# Patient Record
Sex: FEMALE | Race: WHITE | Employment: UNEMPLOYED | URBAN - METROPOLITAN AREA
[De-identification: names, ages, dates, MRNs, and addresses within clinical notes are randomized per-mention and may not be internally consistent; named-entity substitution may affect disease eponyms.]

---

## 2019-07-18 ENCOUNTER — HOSPITAL ENCOUNTER (EMERGENCY)
Age: 24
Discharge: HOME | End: 2019-07-18
Attending: EMERGENCY MEDICINE
Payer: MEDICAID

## 2019-07-18 VITALS
OXYGEN SATURATION: 99 % | HEART RATE: 94 BPM | TEMPERATURE: 98.4 F | DIASTOLIC BLOOD PRESSURE: 79 MMHG | RESPIRATION RATE: 16 BRPM | SYSTOLIC BLOOD PRESSURE: 133 MMHG

## 2019-07-18 DIAGNOSIS — J02.9 SORE THROAT: Primary | ICD-10-CM

## 2019-07-18 DIAGNOSIS — Z34.93 PREGNANT AND NOT YET DELIVERED IN THIRD TRIMESTER: ICD-10-CM

## 2019-07-18 PROCEDURE — 99282 EMERGENCY DEPT VISIT SF MDM: CPT

## 2019-07-18 RX ORDER — GUAIFENESIN 100 MG/5ML
81 LIQUID (ML) ORAL DAILY
COMMUNITY
End: 2021-03-08

## 2019-07-18 RX ORDER — FAMOTIDINE 20 MG/1
20 TABLET, FILM COATED ORAL 2 TIMES DAILY
COMMUNITY
End: 2021-03-08

## 2019-07-18 NOTE — ED PROVIDER NOTES
EMERGENCY DEPARTMENT HISTORY AND PHYSICAL EXAM      Date: 2019  Patient Name: Hugo Gomez    History of Presenting Illness     Chief Complaint   Patient presents with    Sore Throat     pt drank a sip of sweet tea and says that now her throat is swollen, pts throat is not red and is talking clearly and can swallow clearly     History Provided By: Patient    HPI: Dani Bowman, 21 y.o. female presents by POV to the ED with cc of a sore throat. the patient states that she got sweet tea for breakfast this morning from [employer]. Upon taking the first sip she felt a burning sensation in her throat. She did not drink any more of the sweat tea. She did however drink 3 bottles of water, which has helped improve the burning sensation in her throat. She states at first she felt like her throat was swelling but this has also improved since onset. She did not take any medications. There are no other complaints, changes, or physical findings at this time. Social Hx: Tobacco (2-3 cigs per day; attempting to quit), EtOH (denies), Illicit drug use (denies). PCP: Unknown, Provider    No current facility-administered medications on file prior to encounter. Current Outpatient Medications on File Prior to Encounter   Medication Sig Dispense Refill    famotidine (PEPCID) 20 mg tablet Take 20 mg by mouth two (2) times a day.  aspirin 81 mg chewable tablet Take 81 mg by mouth daily.  PNV w-CA No.37-Iron-FA-Omega-3 (OB-PURVI ONE) 27-1-330 mg cap Take 1 Tab by mouth.  Indications: PREGNANCY         Past History     Past Medical History:  Past Medical History:   Diagnosis Date    Hearing disorder     Psychiatric problem     depression       Past Surgical History:  Past Surgical History:   Procedure Laterality Date    HX ORTHOPAEDIC      left foot fracture    HX OTHER SURGICAL      mouth surgery    HX OTHER SURGICAL      wisdom teeth     HX OTHER SURGICAL      myrengotomy        Family History:  History reviewed. No pertinent family history. Social History:  Social History     Tobacco Use    Smoking status: Former Smoker     Last attempt to quit: 2014     Years since quittin.1    Smokeless tobacco: Never Used    Tobacco comment: 1-2 cigarettes a day   Substance Use Topics    Alcohol use: No    Drug use: No       Allergies:  No Known Allergies      Review of Systems   Review of Systems   Constitutional: Negative for chills, diaphoresis and fever. HENT: Positive for sore throat. Negative for congestion, ear pain and rhinorrhea. Respiratory: Negative for cough and shortness of breath. Cardiovascular: Negative for chest pain. Gastrointestinal: Negative for abdominal pain, constipation, diarrhea, nausea and vomiting. Genitourinary: Negative for difficulty urinating, dysuria, frequency and hematuria. Musculoskeletal: Negative for arthralgias and myalgias. Neurological: Negative for headaches. All other systems reviewed and are negative. Physical Exam   Physical Exam   Constitutional: She is oriented to person, place, and time. She appears well-developed and well-nourished. No distress. 21 y.o.  female    HENT:   Head: Normocephalic and atraumatic. Mouth/Throat: Oropharynx is clear and moist. No oropharyngeal exudate. Normal voice. No trismus. Eyes: Conjunctivae are normal. Right eye exhibits no discharge. Left eye exhibits no discharge. Neck: Normal range of motion. Neck supple. Cardiovascular: Normal rate, regular rhythm and normal heart sounds. No murmur heard. Pulmonary/Chest: Effort normal and breath sounds normal. No respiratory distress. Abdominal:   Gravid   Neurological: She is alert and oriented to person, place, and time. Skin: Skin is warm and dry. She is not diaphoretic. Psychiatric: She has a normal mood and affect. Her behavior is normal.   Nursing note and vitals reviewed.       Diagnostic Study Results     Labs - None    Radiologic Studies - None    Medical Decision Making   I am the first provider for this patient. I reviewed the vital signs, available nursing notes, past medical history, past surgical history, family history and social history. Vital Signs-Reviewed the patient's vital signs. Patient Vitals for the past 12 hrs:   Temp Pulse Resp BP SpO2   07/18/19 0912  94  133/79    07/18/19 0827 98.4 °F (36.9 °C) (!) 111 16 146/87 99 %     Records Reviewed: Nursing Notes    Provider Notes (Medical Decision Making):   Pharyngitis, mono, strep, seasonal allergies, allergic reaction    ED Course:   Initial assessment performed. The patients presenting problems have been discussed, and they are in agreement with the care plan formulated and outlined with them. I have encouraged them to ask questions as they arise throughout their visit. Critical Care Time: None    Disposition:  DISCHARGE NOTE:  9:10 AM  The pt is ready for discharge. The pt's signs, symptoms, diagnosis, and discharge instructions have been discussed and pt has conveyed their understanding. The pt is to follow up as recommended or return to ER should their symptoms worsen. Plan has been discussed and pt is in agreement. PLAN:  1. Discharge Medication List as of 7/18/2019  9:10 AM        2. Follow-up Information     Follow up With Specialties Details Why Contact Info    Your PCP or GYN   As needed         Return to ED if worse     Diagnosis     Clinical Impression:   1. Sore throat    2. Pregnant and not yet delivered in third trimester          Please note that this dictation was completed with [employer], the computer voice recognition software. Quite often unanticipated grammatical, syntax, homophones, and other interpretive errors are inadvertently transcribed by the computer software. Please disregards these errors. Please excuse any errors that have escaped final proofreading.      This note will not be viewable in MyChart.

## 2019-07-18 NOTE — DISCHARGE INSTRUCTIONS

## 2019-07-18 NOTE — ED NOTES
JAROCHO Dan have reviewed discharge instructions with the patient. The patient verbalized understanding.

## 2019-07-18 NOTE — ED TRIAGE NOTES
Patient is pregnant and due in 3 weeks and four days. However, she does not have any complaints related to her pregnancy.

## 2020-08-12 ENCOUNTER — HOSPITAL ENCOUNTER (EMERGENCY)
Age: 25
Discharge: HOME | End: 2020-08-12
Attending: EMERGENCY MEDICINE
Payer: MEDICAID

## 2020-08-12 VITALS
HEIGHT: 70 IN | RESPIRATION RATE: 15 BRPM | HEART RATE: 86 BPM | DIASTOLIC BLOOD PRESSURE: 88 MMHG | WEIGHT: 272.49 LBS | BODY MASS INDEX: 39.01 KG/M2 | SYSTOLIC BLOOD PRESSURE: 134 MMHG | TEMPERATURE: 98.4 F | OXYGEN SATURATION: 96 %

## 2020-08-12 DIAGNOSIS — Z20.822 PERSON UNDER INVESTIGATION FOR COVID-19: Primary | ICD-10-CM

## 2020-08-12 DIAGNOSIS — F17.200 SMOKER: ICD-10-CM

## 2020-08-12 PROCEDURE — 99282 EMERGENCY DEPT VISIT SF MDM: CPT

## 2020-08-12 PROCEDURE — 87635 SARS-COV-2 COVID-19 AMP PRB: CPT

## 2020-08-12 NOTE — DISCHARGE INSTRUCTIONS
Patient Education        9 Things To Do If You've Been Exposed to COVID-19    Stay home. If you've been exposed, you should stay in isolation for 14 days. Don't go to school, work, or public areas. And don't use public transportation, ride-shares, or taxis unless you have no choice. Leave your home only if you need to get medical care. But call the doctor's office first so they know you're coming, and wear a cloth face cover when you go. Call your doctor. Call your doctor or other health professional to let them know that you've been exposed. They might want you to be tested, or they may have other instructions for you. If you become sick, wear a face cover when you are around other people. It can help stop the spread of the virus when you cough or sneeze. Limit contact with people in your home. If possible, stay in a separate bedroom and use a separate bathroom. Avoid contact with pets and other animals. Cover your mouth and nose with a tissue when you cough or sneeze. Then throw it in the trash right away. Wash your hands often, especially after you cough or sneeze. Use soap and water, and scrub for at least 20 seconds. If soap and water aren't available, use an alcohol-based hand . Don't share personal household items. These include bedding, towels, cups and glasses, and eating utensils. Clean and disinfect your home every day. Use household  or disinfectant wipes or sprays. Take special care to clean things that you grab with your hands. These include doorknobs, remote controls, phones, and handles on your refrigerator and microwave. And don't forget countertops, tabletops, bathrooms, and computer keyboards. Current as of: May 8, 2020               Content Version: 12.5  © 2006-2020 Healthwise, Incorporated. Care instructions adapted under license by [employer] (which disclaims liability or warranty for this information).  If you have questions about a medical condition or this instruction, always ask your healthcare professional. [address] any warranty or liability for your use of this information. Patient Education        Stopping Smoking: Care Instructions  Your Care Instructions     Cigarette smokers crave the nicotine in cigarettes. Giving it up is much harder than simply changing a habit. Your body has to stop craving the nicotine. It is hard to quit, but you can do it. There are many tools that people use to quit smoking. You may find that combining tools works best for you. There are several steps to quitting. First you get ready to quit. Then you get support to help you. After that, you learn new skills and behaviors to become a nonsmoker. For many people, a necessary step is getting and using medicine. Your doctor will help you set up the plan that best meets your needs. You may want to attend a smoking cessation program to help you quit smoking. When you choose a program, look for one that has proven success. Ask your doctor for ideas. You will greatly increase your chances of success if you take medicine as well as get counseling or join a cessation program.  Some of the changes you feel when you first quit tobacco are uncomfortable. Your body will miss the nicotine at first, and you may feel short-tempered and grumpy. You may have trouble sleeping or concentrating. Medicine can help you deal with these symptoms. You may struggle with changing your smoking habits and rituals. The last step is the tricky one: Be prepared for the smoking urge to continue for a time. This is a lot to deal with, but keep at it. You will feel better. Follow-up care is a key part of your treatment and safety. Be sure to make and go to all appointments, and call your doctor if you are having problems. It's also a good idea to know your test results and keep a list of the medicines you take.   How can you care for yourself at home?  · Ask your family, friends, and coworkers for support. You have a better chance of quitting if you have help and support. · Join a support group, such as Nicotine Anonymous, for people who are trying to quit smoking. · Consider signing up for a smoking cessation program, such as the American Lung Association's Freedom from Smoking program.  · Get text messaging support. Go to the website at www.smokefree. gov to sign up for the [facility] program.  · Set a quit date. Pick your date carefully so that it is not right in the middle of a big deadline or stressful time. Once you quit, do not even take a puff. Get rid of all ashtrays and lighters after your last cigarette. Clean your house and your clothes so that they do not smell of smoke. · Learn how to be a nonsmoker. Think about ways you can avoid those things that make you reach for a cigarette. ? Avoid situations that put you at greatest risk for smoking. For some people, it is hard to have a drink with friends without smoking. For others, they might skip a coffee break with coworkers who smoke. ? Change your daily routine. Take a different route to work or eat a meal in a different place. · Cut down on stress. Calm yourself or release tension by doing an activity you enjoy, such as reading a book, taking a hot bath, or gardening. · Talk to your doctor or pharmacist about nicotine replacement therapy, which replaces the nicotine in your body. You still get nicotine but you do not use tobacco. Nicotine replacement products help you slowly reduce the amount of nicotine you need. These products come in several forms, many of them available over-the-counter:  ? Nicotine patches  ? Nicotine gum and lozenges  ? Nicotine inhaler  · Ask your doctor about bupropion (Wellbutrin) or varenicline (Chantix), which are prescription medicines. They do not contain nicotine. They help you by reducing withdrawal symptoms, such as stress and anxiety.   · Some people find hypnosis, acupuncture, and massage helpful for ending the smoking habit. · Eat a healthy diet and get regular exercise. Having healthy habits will help your body move past its craving for nicotine. · Be prepared to keep trying. Most people are not successful the first few times they try to quit. Do not get mad at yourself if you smoke again. Make a list of things you learned and think about when you want to try again, such as next week, next month, or next year. Where can you learn more? Go to http://breanna-oneil.info/  Enter [id number] in the search box to learn more about \"Stopping Smoking: Care Instructions. \"  Current as of: March 12, 2020               Content Version: 12.5  © 9656-4862 Healthwise, Incorporated. Care instructions adapted under license by [employer] (which disclaims liability or warranty for this information). If you have questions about a medical condition or this instruction, always ask your healthcare professional. [place] [address] any warranty or liability for your use of this information.

## 2020-08-12 NOTE — ED TRIAGE NOTES
Pt states she had some of her coworkers test positive for COVID. Pt states she is having headaches at home that she states she can manage on her own. Pt's employer states she must be tested prior to returning to work. Pt denies CP, SOB, cough, fever.

## 2020-08-12 NOTE — ED PROVIDER NOTES
EMERGENCY DEPARTMENT HISTORY AND PHYSICAL EXAM      Date: 2020  Patient Name: Nell Barthel Cornell    History of Presenting Illness     Chief Complaint   Patient presents with    Covid Testing     Pt states she was told she was exposed to 2 coworkers with covid on Monday. Her work will not let her come back without a covid test       History Provided By: Patient    HPI: Eva Hoffmann, 25 y.o. female without PMHx significant, presents by POV to the ED without complaint. She notes she exposed to 2 individuals at work 2 days ago who recently tested positive for COVID 19. She was sent to the ED for a COVID test as she was told that she needs a negative test to return to work. Occupation: Works for [employer]: There has been no recent international or domestic travel. There are no other complaints, changes, or physical findings at this time. Social Hx: Tobacco (1 ppd), EtOH (rare), Illicit drug use (denies)     PCP: Unknown, Provider    No current facility-administered medications on file prior to encounter. Current Outpatient Medications on File Prior to Encounter   Medication Sig Dispense Refill    famotidine (PEPCID) 20 mg tablet Take 20 mg by mouth two (2) times a day.  aspirin 81 mg chewable tablet Take 81 mg by mouth daily.  PNV w-CA No.37-Iron-FA-Omega-3 (OB-PURVI ONE) 27-1-330 mg cap Take 1 Tab by mouth. Indications: PREGNANCY         Past History     Past Medical History:  Past Medical History:   Diagnosis Date    Hearing disorder     Psychiatric problem     depression       Past Surgical History:  Past Surgical History:   Procedure Laterality Date    HX ORTHOPAEDIC      left foot fracture    HX OTHER SURGICAL      mouth surgery    HX OTHER SURGICAL      wisdom teeth     HX OTHER SURGICAL      myrengotomy        Family History:  No family history on file.     Social History:  Social History     Tobacco Use    Smoking status: Former Smoker     Last attempt to quit: 2014     Years since quittin.2    Smokeless tobacco: Never Used    Tobacco comment: 1-2 cigarettes a day   Substance Use Topics    Alcohol use: No    Drug use: No       Allergies:  No Known Allergies      Review of Systems   Review of Systems   Constitutional: Negative for chills, diaphoresis and fever. HENT: Negative for congestion, ear pain, rhinorrhea and sore throat. Respiratory: Negative for cough and shortness of breath. Cardiovascular: Negative for chest pain. Gastrointestinal: Negative for abdominal pain, constipation, diarrhea, nausea and vomiting. Genitourinary: Negative for difficulty urinating, dysuria, frequency and hematuria. Musculoskeletal: Negative for arthralgias and myalgias. Neurological: Negative for headaches. All other systems reviewed and are negative. Physical Exam   Physical Exam  Vitals signs and nursing note reviewed. Constitutional:       General: She is not in acute distress. Appearance: She is well-developed. She is obese. She is not diaphoretic. Comments: 25 y.o.  female    HENT:      Head: Normocephalic and atraumatic. Eyes:      General:         Right eye: No discharge. Left eye: No discharge. Conjunctiva/sclera: Conjunctivae normal.   Neck:      Musculoskeletal: Normal range of motion and neck supple. Cardiovascular:      Rate and Rhythm: Normal rate. Pulmonary:      Effort: Pulmonary effort is normal.   Skin:     General: Skin is warm and dry. Neurological:      Mental Status: She is alert and oriented to person, place, and time. Psychiatric:         Behavior: Behavior normal.         Diagnostic Study Results     Labs - COVID-19 testing pending at discharge. Radiologic Studies - None    Medical Decision Making   I am the first provider for this patient.     I reviewed the vital signs, available nursing notes, past medical history, past surgical history, family history and social history. Vital Signs-Reviewed the patient's vital signs. Patient Vitals for the past 12 hrs:   Temp Pulse Resp BP SpO2   08/12/20 1933 98.4 °F (36.9 °C) 86 15 134/88 96 %       Records Reviewed: Nursing Notes    Provider Notes (Medical Decision Making): The evaluation, management, and disposition decisions of this patient have been made in the context of the current and rapidly developing COVID-19 pandemic. In my clinical judgment, the balance of clinical factors dictate expedited evaluation and discharge from the ED. I have carefully considered the risk and benefits of prolonged ED workups and/or hospitalization vs their risk of acquiring or transmitting COVID-19. I have made reasonable efforts to conserve healthcare resources and defer to safe outpatient alternatives when feasible. I have also discussed the importance of social distancing and proper hygiene to the patient. Based on an appropriate medical screening exam, there is currently no evidence of an emergency medical condition in the patient, and she is clinically safe for discharge. This was a collective decision made with the patient and/or any available family/caretakers. They expressed understanding and agreement with the above. ED Course:   Initial assessment performed. The patients presenting problems have been discussed, and they are in agreement with the care plan formulated and outlined with them. I have encouraged them to ask questions as they arise throughout their visit. Critical Care Time: None    Disposition:  DISCHARGE NOTE:  7:52 PM  The pt is ready for discharge. The pt's signs, symptoms, diagnosis, and discharge instructions have been discussed and pt has conveyed their understanding. The pt is to follow up as recommended or return to ER should their symptoms worsen. Plan has been discussed and pt is in agreement. PLAN:  1. Current Discharge Medication List        2.    Follow-up Information     Follow up With Specialties Details Why Contact Info    Your PCP   As needed         3. COVID Testing results will be called once available  4. Take Tylenol as needed; Avoid NSAIDs  5. Drink plenty of fluids  6. It is advised to lay prone for 3 hours daily  Return to ED if worse     Diagnosis     Clinical Impression:   1. Person under investigation for COVID-19    2. Smoker          Please note that this dictation was completed with [employer], the computer voice recognition software. Quite often unanticipated grammatical, syntax, homophones, and other interpretive errors are inadvertently transcribed by the computer software. Please disregards these errors. Please excuse any errors that have escaped final proofreading. This note will not be viewable in [address]

## 2020-08-13 ENCOUNTER — TELEPHONE (OUTPATIENT)
Dept: CASE MANAGEMENT | Age: 25
End: 2020-08-13

## 2020-08-13 NOTE — ED NOTES
Patient discharged by Amna Grant. Patient provided with discharge instructions Rx and instructions on follow up care. Patient out of ED ambulatory accompanied by self.

## 2020-08-13 NOTE — TELEPHONE ENCOUNTER
20 3:33 PM     Patient contacted regarding COVID-19 exposure. Discussed COVID-19 related testing which was pending at this time. Test results were pending. Patient informed of results, if available? N/A     Care Transition Nurse/ Ambulatory Care Manager/ LPN Care Coordinator contacted the patient by telephone to perform post discharge assessment. Verified name and  with patient as identifiers. Provided introduction to self, and explanation of the CTN/ACM/LPN role, and reason for call due to risk factors for infection and/or exposure to COVID-19. Symptoms reviewed with patient who verbalized the following symptoms: pain or aching joints, nausea, vomiting, low body temperature, no worsening symptoms and also has headaches, but states headaches are not new for her. .      Due to no new or worsening symptoms encounter was not routed to provider for escalation. Discussed follow-up appointments. If no appointment was previously scheduled, appointment scheduling offered: [facility]): No future appointments. Non[facility] follow up appointment(s): N/A   States she is waiting to have a new PCP assigned; has just moved to [place]. Advance Care Planning:   Does patient have an Advance Directive: not on file     Patient has following risk factors of: no known risk factors. CTN/ACM/LPN reviewed discharge instructions, medical action plan and red flags such as increased shortness of breath, increasing fever and signs of decompensation with patient who verbalized understanding. Discussed exposure protocols and quarantine with CDC Guidelines What to do if you are sick with coronavirus disease 2019.  Patient was given an opportunity for questions and concerns. Verbalizes understanding of the need to quarantine until negative results or for 14 days if positive result. Encouraged pt to activate MyChart and with her permission, I've sent an email invitation.   Advised that if she activates [employer] in the next week or so, I'll send our COVID-19 information and phone resources to her via the portal.    No medications prescribed in ED visit and no F/U appts recommended. Patient/family/caregiver given information for [employer] and agrees to enroll no, declined  Patient's preferred e-mail:  N/A  Patient's preferred phone number: N/A  Based on Loop alert triggers, patient will be contacted by nurse care manager for worsening symptoms. Plan for follow-up call in 14 days based on severity of symptoms and risk factors.

## 2020-08-14 LAB
HEALTH STATUS, XMCV2T: NORMAL
SARS-COV-2, COV2NT: NOT DETECTED
SOURCE, COVRS: NORMAL
SPECIMEN SOURCE, FCOV2M: NORMAL
SPECIMEN TYPE, XMCV1T: NORMAL

## 2020-08-25 ENCOUNTER — PATIENT MESSAGE (OUTPATIENT)
Dept: CASE MANAGEMENT | Age: 25
End: 2020-08-25

## 2020-08-29 ENCOUNTER — TELEPHONE (OUTPATIENT)
Dept: CASE MANAGEMENT | Age: 25
End: 2020-08-29

## 2020-08-29 NOTE — TELEPHONE ENCOUNTER
8/29/20 5:09 PM     Patient resolved from Transition of Care episode on 8/29/20. Discussed COVID-19 related testing which was available at this time. Test results were negative. Patient informed of results, if available? I'm not certain but she didn't ask about these on the call. Patient/family has been provided the following resources and education related to COVID-19:                         Signs, symptoms and red flags related to COVID-19            CDC exposure and quarantine guidelines            [facility] exposure contact - [phone]            Contact for their local Department of Health                 Patient currently reports that the following symptoms have improved:  pt states she is doing really well now and does not have any questions/concerns. I thanked her for her time, advised this will be my final call to her, and we disconnected. No further outreach scheduled with this CTN/ACM/LPN/HC/ MA. Episode of Care resolved. Patient has this CTN/ACM/LPN/HC/MA contact information if future needs arise.

## 2021-02-11 ENCOUNTER — HOSPITAL ENCOUNTER (EMERGENCY)
Age: 26
Discharge: HOME | End: 2021-02-11
Attending: EMERGENCY MEDICINE
Payer: MEDICAID

## 2021-02-11 ENCOUNTER — APPOINTMENT (OUTPATIENT)
Dept: CT IMAGING | Age: 26
End: 2021-02-11
Attending: EMERGENCY MEDICINE
Payer: MEDICAID

## 2021-02-11 VITALS
TEMPERATURE: 98.5 F | HEART RATE: 93 BPM | SYSTOLIC BLOOD PRESSURE: 161 MMHG | DIASTOLIC BLOOD PRESSURE: 55 MMHG | RESPIRATION RATE: 16 BRPM | OXYGEN SATURATION: 98 % | WEIGHT: 293 LBS | BODY MASS INDEX: 48.82 KG/M2 | HEIGHT: 65 IN

## 2021-02-11 DIAGNOSIS — R14.0 ABDOMINAL BLOATING: ICD-10-CM

## 2021-02-11 DIAGNOSIS — R10.84 ABDOMINAL PAIN, GENERALIZED: Primary | ICD-10-CM

## 2021-02-11 DIAGNOSIS — K76.0 HEPATIC STEATOSIS: ICD-10-CM

## 2021-02-11 LAB
ALBUMIN SERPL-MCNC: 4 G/DL (ref 3.5–5)
ALBUMIN/GLOB SERPL: 0.9 {RATIO} (ref 1.1–2.2)
ALP SERPL-CCNC: 78 U/L (ref 45–117)
ALT SERPL-CCNC: 44 U/L (ref 12–78)
ANION GAP SERPL CALC-SCNC: 6 MMOL/L (ref 5–15)
APPEARANCE UR: CLEAR
AST SERPL-CCNC: 19 U/L (ref 15–37)
BACTERIA URNS QL MICRO: NEGATIVE /HPF
BASOPHILS # BLD: 0 K/UL (ref 0–0.1)
BASOPHILS NFR BLD: 0 % (ref 0–1)
BILIRUB SERPL-MCNC: 0.3 MG/DL (ref 0.2–1)
BILIRUB UR QL: NEGATIVE
BUN SERPL-MCNC: 9 MG/DL (ref 6–20)
BUN/CREAT SERPL: 12 (ref 12–20)
CALCIUM SERPL-MCNC: 9.3 MG/DL (ref 8.5–10.1)
CHLORIDE SERPL-SCNC: 104 MMOL/L (ref 97–108)
CO2 SERPL-SCNC: 26 MMOL/L (ref 21–32)
COLOR UR: NORMAL
CREAT SERPL-MCNC: 0.77 MG/DL (ref 0.55–1.02)
DIFFERENTIAL METHOD BLD: ABNORMAL
EOSINOPHIL # BLD: 0.1 K/UL (ref 0–0.4)
EOSINOPHIL NFR BLD: 1 % (ref 0–7)
EPITH CASTS URNS QL MICRO: NORMAL /LPF
ERYTHROCYTE [DISTWIDTH] IN BLOOD BY AUTOMATED COUNT: 12.6 % (ref 11.5–14.5)
GLOBULIN SER CALC-MCNC: 4.6 G/DL (ref 2–4)
GLUCOSE SERPL-MCNC: 99 MG/DL (ref 65–100)
GLUCOSE UR STRIP.AUTO-MCNC: NEGATIVE MG/DL
HCG SERPL QL: NEGATIVE
HCT VFR BLD AUTO: 43.2 % (ref 35–47)
HGB BLD-MCNC: 14.3 G/DL (ref 11.5–16)
HGB UR QL STRIP: NEGATIVE
HYALINE CASTS URNS QL MICRO: NORMAL /LPF (ref 0–5)
IMM GRANULOCYTES # BLD AUTO: 0 K/UL (ref 0–0.04)
IMM GRANULOCYTES NFR BLD AUTO: 0 % (ref 0–0.5)
KETONES UR QL STRIP.AUTO: NEGATIVE MG/DL
LEUKOCYTE ESTERASE UR QL STRIP.AUTO: NEGATIVE
LIPASE SERPL-CCNC: 71 U/L (ref 73–393)
LYMPHOCYTES # BLD: 2.9 K/UL (ref 0.8–3.5)
LYMPHOCYTES NFR BLD: 26 % (ref 12–49)
MCH RBC QN AUTO: 29.5 PG (ref 26–34)
MCHC RBC AUTO-ENTMCNC: 33.1 G/DL (ref 30–36.5)
MCV RBC AUTO: 89.1 FL (ref 80–99)
MONOCYTES # BLD: 0.6 K/UL (ref 0–1)
MONOCYTES NFR BLD: 6 % (ref 5–13)
NEUTS SEG # BLD: 7.4 K/UL (ref 1.8–8)
NEUTS SEG NFR BLD: 67 % (ref 32–75)
NITRITE UR QL STRIP.AUTO: NEGATIVE
NRBC # BLD: 0 K/UL (ref 0–0.01)
NRBC BLD-RTO: 0 PER 100 WBC
PH UR STRIP: 6 [PH] (ref 5–8)
PLATELET # BLD AUTO: 358 K/UL (ref 150–400)
PMV BLD AUTO: 9.7 FL (ref 8.9–12.9)
POTASSIUM SERPL-SCNC: 3.9 MMOL/L (ref 3.5–5.1)
PROT SERPL-MCNC: 8.6 G/DL (ref 6.4–8.2)
PROT UR STRIP-MCNC: NEGATIVE MG/DL
RBC # BLD AUTO: 4.85 M/UL (ref 3.8–5.2)
RBC #/AREA URNS HPF: NORMAL /HPF (ref 0–5)
SODIUM SERPL-SCNC: 136 MMOL/L (ref 136–145)
SP GR UR REFRACTOMETRY: 1.02 (ref 1–1.03)
TSH SERPL DL<=0.05 MIU/L-ACNC: 7.63 UIU/ML (ref 0.36–3.74)
UA: UC IF INDICATED,UAUC: NORMAL
UROBILINOGEN UR QL STRIP.AUTO: 0.2 EU/DL (ref 0.2–1)
WBC # BLD AUTO: 11.1 K/UL (ref 3.6–11)
WBC URNS QL MICRO: NORMAL /HPF (ref 0–4)

## 2021-02-11 PROCEDURE — 85025 COMPLETE CBC W/AUTO DIFF WBC: CPT

## 2021-02-11 PROCEDURE — 84703 CHORIONIC GONADOTROPIN ASSAY: CPT

## 2021-02-11 PROCEDURE — 74177 CT ABD & PELVIS W/CONTRAST: CPT

## 2021-02-11 PROCEDURE — 84443 ASSAY THYROID STIM HORMONE: CPT

## 2021-02-11 PROCEDURE — 99283 EMERGENCY DEPT VISIT LOW MDM: CPT

## 2021-02-11 PROCEDURE — 81001 URINALYSIS AUTO W/SCOPE: CPT

## 2021-02-11 PROCEDURE — 80053 COMPREHEN METABOLIC PANEL: CPT

## 2021-02-11 PROCEDURE — 74011000636 HC RX REV CODE- 636: Performed by: EMERGENCY MEDICINE

## 2021-02-11 PROCEDURE — 36415 COLL VENOUS BLD VENIPUNCTURE: CPT

## 2021-02-11 PROCEDURE — 83690 ASSAY OF LIPASE: CPT

## 2021-02-11 RX ORDER — DICYCLOMINE HYDROCHLORIDE 20 MG/1
20 TABLET ORAL
Qty: 20 TAB | Refills: 0 | Status: SHIPPED | OUTPATIENT
Start: 2021-02-11 | End: 2021-03-08

## 2021-02-11 RX ADMIN — IOPAMIDOL 100 ML: 755 INJECTION, SOLUTION INTRAVENOUS at 20:37

## 2021-02-12 NOTE — ED NOTES
I have reviewed discharge instructions with the patient. The patient verbalized understanding. Pt refused doing her TSH lab.  Pt stated that she will get it done outpatient

## 2021-02-12 NOTE — ED PROVIDER NOTES
EMERGENCY DEPARTMENT HISTORY AND PHYSICAL EXAM      Date: 2021  Patient Name: Meenakshi Gomez    History of Presenting Illness     Chief Complaint   Patient presents with    Abdominal Pain     Bloating in abd for several months, now firmness noted. Reports diarrhea and urinary frequency. Several pregnancy tests done, all negative. Pt has birth control injection. Concern now for ovarian cancer, reports +family hx. History Provided By: Patient    HPI: Elvis Santillan, 22 y.o. female presents to the ED with cc of abdominal pain and bloating. Patient symptoms started 2 months ago. She does not really have pain:  it is more of a pressure in her whole abdomen. She has also gained 45 pounds during that time. She denies dysuria but has urinary frequency. She told nursing that she was having diarrhea. She denied diarrhea when I asked. She denies fever, chills, cough, chest pain or shortness of breath. The severity of the pressure sensation in the abdomen is a 5 out of 10. She has a family history of ovarian cancer, so she is worried about that. She has not taken anything for the pain. Occasionally, she has back pain, but denies that currently. There are no other complaints, changes, or physical findings at this time. PCP: None    No current facility-administered medications on file prior to encounter. Current Outpatient Medications on File Prior to Encounter   Medication Sig Dispense Refill    famotidine (PEPCID) 20 mg tablet Take 20 mg by mouth two (2) times a day.  aspirin 81 mg chewable tablet Take 81 mg by mouth daily.  PNV w-CA No.37-Iron-FA-Omega-3 (OB- ONE) 27-1-330 mg cap Take 1 Tab by mouth.  Indications: PREGNANCY         Past History     Past Medical History:  Past Medical History:   Diagnosis Date    Hearing disorder     Psychiatric problem     depression       Past Surgical History:  Past Surgical History:   Procedure Laterality Date    HX ORTHOPAEDIC      left foot fracture    HX OTHER SURGICAL      mouth surgery    HX OTHER SURGICAL      wisdom teeth 2013    HX OTHER SURGICAL      myrengotomy 2010       Family History:  No family history on file. Social History:  Social History     Tobacco Use    Smoking status: Former Smoker     Quit date: 2014     Years since quittin.7    Smokeless tobacco: Never Used    Tobacco comment: 1-2 cigarettes a day   Substance Use Topics    Alcohol use: No    Drug use: No       Allergies:  No Known Allergies      Review of Systems   Review of Systems   Constitutional: Negative for fever. HENT: Negative for congestion. Eyes: Negative. Respiratory: Negative for shortness of breath. Cardiovascular: Negative for chest pain. Gastrointestinal: Positive for abdominal pain. Endocrine: Negative for heat intolerance. Genitourinary: Negative. Musculoskeletal: Negative for back pain. Skin: Negative for rash. Allergic/Immunologic: Negative for immunocompromised state. Neurological: Negative for dizziness. Hematological: Does not bruise/bleed easily. Psychiatric/Behavioral: Negative. All other systems reviewed and are negative. Physical Exam   Physical Exam  Vitals signs and nursing note reviewed. Constitutional:       General: She is not in acute distress. Appearance: She is well-developed. HENT:      Head: Normocephalic. Neck:      Musculoskeletal: Normal range of motion and neck supple. Cardiovascular:      Rate and Rhythm: Normal rate and regular rhythm. Heart sounds: Normal heart sounds. Pulmonary:      Effort: Pulmonary effort is normal.      Breath sounds: Normal breath sounds. Abdominal:      General: Bowel sounds are normal.      Palpations: Abdomen is soft. Tenderness: There is generalized abdominal tenderness. Musculoskeletal: Normal range of motion. Skin:     General: Skin is warm and dry.    Neurological:      General: No focal deficit present. Mental Status: She is alert and oriented to person, place, and time. Psychiatric:         Mood and Affect: Mood normal.         Behavior: Behavior normal.         Diagnostic Study Results     Labs -     Recent Results (from the past 12 hour(s))   URINALYSIS W/ REFLEX CULTURE    Collection Time: 02/11/21  6:36 PM    Specimen: Urine   Result Value Ref Range    Color YELLOW/STRAW      Appearance CLEAR CLEAR      Specific gravity 1.021 1.003 - 1.030      pH (UA) 6.0 5.0 - 8.0      Protein Negative NEG mg/dL    Glucose Negative NEG mg/dL    Ketone Negative NEG mg/dL    Bilirubin Negative NEG      Blood Negative NEG      Urobilinogen 0.2 0.2 - 1.0 EU/dL    Nitrites Negative NEG      Leukocyte Esterase Negative NEG      WBC 0-4 0 - 4 /hpf    RBC 0-5 0 - 5 /hpf    Epithelial cells FEW FEW /lpf    Bacteria Negative NEG /hpf    UA:UC IF INDICATED CULTURE NOT INDICATED BY UA RESULT CNI      Hyaline cast 0-2 0 - 5 /lpf   METABOLIC PANEL, COMPREHENSIVE    Collection Time: 02/11/21  6:43 PM   Result Value Ref Range    Sodium 136 136 - 145 mmol/L    Potassium 3.9 3.5 - 5.1 mmol/L    Chloride 104 97 - 108 mmol/L    CO2 26 21 - 32 mmol/L    Anion gap 6 5 - 15 mmol/L    Glucose 99 65 - 100 mg/dL    BUN 9 6 - 20 MG/DL    Creatinine 0.77 0.55 - 1.02 MG/DL    BUN/Creatinine ratio 12 12 - 20      GFR est AA >60 >60 ml/min/1.73m2    GFR est non-AA >60 >60 ml/min/1.73m2    Calcium 9.3 8.5 - 10.1 MG/DL    Bilirubin, total 0.3 0.2 - 1.0 MG/DL    ALT (SGPT) 44 12 - 78 U/L    AST (SGOT) 19 15 - 37 U/L    Alk.  phosphatase 78 45 - 117 U/L    Protein, total 8.6 (H) 6.4 - 8.2 g/dL    Albumin 4.0 3.5 - 5.0 g/dL    Globulin 4.6 (H) 2.0 - 4.0 g/dL    A-G Ratio 0.9 (L) 1.1 - 2.2     LIPASE    Collection Time: 02/11/21  6:43 PM   Result Value Ref Range    Lipase 71 (L) 73 - 393 U/L   HCG QL SERUM    Collection Time: 02/11/21  6:43 PM   Result Value Ref Range    HCG, Ql. Negative NEG     CBC WITH AUTOMATED DIFF    Collection Time: 02/11/21  6:43 PM   Result Value Ref Range    WBC 11.1 (H) 3.6 - 11.0 K/uL    RBC 4.85 3.80 - 5.20 M/uL    HGB 14.3 11.5 - 16.0 g/dL    HCT 43.2 35.0 - 47.0 %    MCV 89.1 80.0 - 99.0 FL    MCH 29.5 26.0 - 34.0 PG    MCHC 33.1 30.0 - 36.5 g/dL    RDW 12.6 11.5 - 14.5 %    PLATELET 931 033 - 829 K/uL    MPV 9.7 8.9 - 12.9 FL    NRBC 0.0 0  WBC    ABSOLUTE NRBC 0.00 0.00 - 0.01 K/uL    NEUTROPHILS 67 32 - 75 %    LYMPHOCYTES 26 12 - 49 %    MONOCYTES 6 5 - 13 %    EOSINOPHILS 1 0 - 7 %    BASOPHILS 0 0 - 1 %    IMMATURE GRANULOCYTES 0 0.0 - 0.5 %    ABS. NEUTROPHILS 7.4 1.8 - 8.0 K/UL    ABS. LYMPHOCYTES 2.9 0.8 - 3.5 K/UL    ABS. MONOCYTES 0.6 0.0 - 1.0 K/UL    ABS. EOSINOPHILS 0.1 0.0 - 0.4 K/UL    ABS. BASOPHILS 0.0 0.0 - 0.1 K/UL    ABS. IMM. GRANS. 0.0 0.00 - 0.04 K/UL    DF AUTOMATED         Radiologic Studies -   CT ABD PELV W CONT   Final Result   Hepatic steatosis. No acute process identified        CT Results  (Last 48 hours)    None        CXR Results  (Last 48 hours)    None          Medical Decision Making   I am the first provider for this patient. I reviewed the vital signs, available nursing notes, past medical history, past surgical history, family history and social history. Vital Signs-Reviewed the patient's vital signs. Patient Vitals for the past 12 hrs:   Temp Pulse Resp BP SpO2   02/11/21 1900    (!) 161/55    02/11/21 1845    (!) 152/90 98 %   02/11/21 1738 98.5 °F (36.9 °C) 93 16 (!) 164/85 98 %         Records Reviewed: Nursing Notes and Old Medical Records    Provider Notes (Medical Decision Making):   Ascites, diverticulitis, malignancy, appendicitis,    ED Course:   Initial assessment performed. The patients presenting problems have been discussed, and they are in agreement with the care plan formulated and outlined with them. I have encouraged them to ask questions as they arise throughout their visit. progress note:    Patient is feeling better.   Her results were reviewed. She is advised to follow-up and return to ER if worse             Critical Care Time:   0    Disposition:  home    DISCHARGE PLAN:  1. Discharge Medication List as of 2021  9:33 PM      START taking these medications    Details   dicyclomine (BENTYL) 20 mg tablet Take 1 Tab by mouth every six (6) hours as needed for Abdominal Cramps., Normal, Disp-20 Tab, R-0         CONTINUE these medications which have NOT CHANGED    Details   famotidine (PEPCID) 20 mg tablet Take 20 mg by mouth two (2) times a day., Historical Med      aspirin 81 mg chewable tablet Take 81 mg by mouth daily. , Historical Med      PNV w-CA No.37-Iron-FA-Omega-3 (OB- ONE) 27-1-330 mg cap Take 1 Tab by mouth. Indications: PREGNANCY, Historical Med           2. Follow-up Information     Follow up With Specialties Details Why Contact Info    Yun Castro MD Gastroenterology  As needed [address]  [address] [facility]  [phone] [address] [place] [address] EMERGENCY DEPT Emergency Medicine  If symptoms worsen [address]  [address] [facility]  [phone]        3. Return to ED if worse     Diagnosis     Clinical Impression:   1. Abdominal pain, generalized    2. Hepatic steatosis    3. Abdominal bloating        Attestations:    Kenneth Shay MD    Please note that this dictation was completed with [employer], the computer voice recognition software. Quite often unanticipated grammatical, syntax, homophones, and other interpretive errors are inadvertently transcribed by the computer software. Please disregard these errors. Please excuse any errors that have escaped final proofreading. Thank you.

## 2021-02-12 NOTE — ED NOTES
Bedside shift change report given to [place] (oncoming nurse) by Marda Cowden (offgoing nurse). Report included the following information SBAR, ED Summary and MAR.

## 2021-03-08 ENCOUNTER — OFFICE VISIT (OUTPATIENT)
Dept: INTERNAL MEDICINE CLINIC | Age: 26
End: 2021-03-08
Payer: MEDICAID

## 2021-03-08 VITALS
HEART RATE: 103 BPM | TEMPERATURE: 98.6 F | DIASTOLIC BLOOD PRESSURE: 83 MMHG | SYSTOLIC BLOOD PRESSURE: 131 MMHG | WEIGHT: 293 LBS | BODY MASS INDEX: 48.82 KG/M2 | OXYGEN SATURATION: 96 % | HEIGHT: 65 IN | RESPIRATION RATE: 12 BRPM

## 2021-03-08 DIAGNOSIS — Z23 NEEDS FLU SHOT: Primary | ICD-10-CM

## 2021-03-08 DIAGNOSIS — E03.9 ACQUIRED HYPOTHYROIDISM: ICD-10-CM

## 2021-03-08 DIAGNOSIS — N32.81 OVERACTIVE BLADDER: ICD-10-CM

## 2021-03-08 DIAGNOSIS — M72.2 BILATERAL PLANTAR FASCIITIS: ICD-10-CM

## 2021-03-08 DIAGNOSIS — E66.01 MORBID OBESITY WITH BMI OF 50.0-59.9, ADULT (HCC): ICD-10-CM

## 2021-03-08 DIAGNOSIS — K21.9 GASTROESOPHAGEAL REFLUX DISEASE WITHOUT ESOPHAGITIS: ICD-10-CM

## 2021-03-08 DIAGNOSIS — F51.01 PRIMARY INSOMNIA: ICD-10-CM

## 2021-03-08 DIAGNOSIS — K76.0 HEPATIC STEATOSIS: ICD-10-CM

## 2021-03-08 DIAGNOSIS — R14.0 ABDOMINAL BLOATING: ICD-10-CM

## 2021-03-08 PROCEDURE — 99204 OFFICE O/P NEW MOD 45 MIN: CPT | Performed by: PHYSICIAN ASSISTANT

## 2021-03-08 PROCEDURE — 90471 IMMUNIZATION ADMIN: CPT | Performed by: PHYSICIAN ASSISTANT

## 2021-03-08 PROCEDURE — 90686 IIV4 VACC NO PRSV 0.5 ML IM: CPT | Performed by: PHYSICIAN ASSISTANT

## 2021-03-08 RX ORDER — TRAZODONE HYDROCHLORIDE 50 MG/1
50 TABLET ORAL
Qty: 30 TAB | Refills: 2 | Status: SHIPPED | OUTPATIENT
Start: 2021-03-08 | End: 2021-04-06

## 2021-03-08 RX ORDER — LEVOTHYROXINE SODIUM 25 UG/1
25 TABLET ORAL
Qty: 30 TAB | Refills: 1 | Status: SHIPPED | OUTPATIENT
Start: 2021-03-08 | End: 2021-06-23 | Stop reason: SDUPTHER

## 2021-03-08 RX ORDER — OMEPRAZOLE 40 MG/1
40 CAPSULE, DELAYED RELEASE ORAL DAILY
Qty: 30 CAP | Refills: 2 | Status: SHIPPED | OUTPATIENT
Start: 2021-03-08 | End: 2021-06-28

## 2021-03-08 NOTE — PROGRESS NOTES
Sarah Gomez  Identified pt with two pt identifiers(name and ). Chief Complaint   Patient presents with   [address] [facility] ED Follow-up    Immunization/Injection       Reviewed record In preparation for visit and have obtained necessary documentation. 1. Have you been to the ER, urgent care clinic or hospitalized since your last visit? 1st visit    2. Have you seen or consulted any other health care providers outside of the [address] [place] [address] since your last visit? Include any pap smears or colon screening. 1st visit    Vitals reviewed with provider. Health Maintenance reviewed: After verbal order read back of [facility], patient received Flu Shot in left deltoid. Derik Pathak 47: 20920-299-34 Lot: [id number] 21. Patient tolerated procedure without complaints and received VIS. Health Maintenance Due   Topic    Hepatitis C Screening     HPV Age 9Y-34Y (2 - 2-dose series)    COVID-19 Vaccine (1 of 2)    PAP AKA CERVICAL CYTOLOGY     Flu Vaccine (1)     Wt Readings from Last 3 Encounters:   21 314 lb (142.4 kg)   21 308 lb 6.8 oz (139.9 kg)   20 272 lb 7.8 oz (123.6 kg)   1   Temp Readings from Last 3 Encounters:   21 98.6 °F (37 °C) (Oral)   21 98.5 °F (36.9 °C)   20 98.4 °F (36.9 °C)    Patient    SOME COLLEGE    NONE    No   PRIMARY LANGUAGE ENGLISH   LEARNER PREFERENCE PRIMARY DEMONSTRATION   ANSWERED BY patient   RELATIONSHIP SELF        Depression Screening:   :       3 most recent PHQ Screens 3/8/2021   Little interest or pleasure in doing things More than half the days   Feeling down, depressed, irritable, or hopeless Nearly every day   Total Score PHQ 2 5        Fall Risk Assessment:   :     No flowsheet data found. Abuse Screening:   :     No flowsheet data found.      ADL Screening:   :       ADL Assessment 3/8/2021   Feeding yourself No Help Needed   Getting from bed to chair No Help Needed   Getting dressed No Help Needed Bathing or showering No Help Needed   Walk across the room (includes cane/walker) No Help Needed   Using the telphone No Help Needed   Taking your medications No Help Needed   Preparing meals No Help Needed   Managing money (expenses/bills) No Help Needed   Moderately strenuous housework (laundry) No Help Needed   Shopping for personal items (toiletries/medicines) No Help Needed   Shopping for groceries No Help Needed   Driving No Help Needed   Climbing a flight of stairs No Help Needed   Getting to places beyond walking distances No Help Needed

## 2021-03-08 NOTE — PATIENT INSTRUCTIONS
Vaccine Information Statement Influenza (Flu) Vaccine (Inactivated or Recombinant): What You Need to Know Many Vaccine Information Statements are available in Estonian and other languages. See www.immunize.org/vis Hojas de información sobre vacunas están disponibles en español y en muchos otros idiomas. Visite www.immunize.org/vis 1. Why get vaccinated? Influenza vaccine can prevent influenza (flu). Flu is a contagious disease that spreads around the [facility] every year, usually between October and May. Anyone can get the flu, but it is more dangerous for some people. Infants and young children, people 72years of age and older, pregnant women, and people with certain health conditions or a weakened immune system are at greatest risk of flu complications. Pneumonia, bronchitis, sinus infections and ear infections are examples of flu-related complications. If you have a medical condition, such as heart disease, cancer or diabetes, flu can make it worse. Flu can cause fever and chills, sore throat, muscle aches, fatigue, cough, headache, and runny or stuffy nose. Some people may have vomiting and diarrhea, though this is more common in children than adults. Each year thousands of people in the [facility] die from flu, and many more are hospitalized. Flu vaccine prevents millions of illnesses and flu-related visits to the doctor each year. 2. Influenza vaccines CDC recommends everyone 10months of age and older get vaccinated every flu season. Children 6 months through 6years of age may need 2 doses during a single flu season. Everyone else needs only 1 dose each flu season. It takes about 2 weeks for protection to develop after vaccination. There are many flu viruses, and they are always changing. Each year a new flu vaccine is made to protect against three or four viruses that are likely to cause disease in the upcoming flu season.  Even when the vaccine doesnt exactly match these viruses, it may still provide some protection. Influenza vaccine does not cause flu. Influenza vaccine may be given at the same time as other vaccines. 3. Talk with your health care provider Tell your vaccine provider if the person getting the vaccine: 
 Has had an allergic reaction after a previous dose of influenza vaccine, or has any severe, life-threatening allergies.  Has ever had Guillain-Barré Syndrome (also called GBS). In some cases, your health care provider may decide to postpone influenza vaccination to a future visit. People with minor illnesses, such as a cold, may be vaccinated. People who are moderately or severely ill should usually wait until they recover before getting influenza vaccine. Your health care provider can give you more information. 4. Risks of a reaction  Soreness, redness, and swelling where shot is given, fever, muscle aches, and headache can happen after influenza vaccine.  There may be a very small increased risk of Guillain-Barré Syndrome (GBS) after inactivated influenza vaccine (the flu shot). Kristen Fenton children who get the flu shot along with pneumococcal vaccine (PCV13), and/or DTaP vaccine at the same time might be slightly more likely to have a seizure caused by fever. Tell your health care provider if a child who is getting flu vaccine has ever had a seizure. People sometimes faint after medical procedures, including vaccination. Tell your provider if you feel dizzy or have vision changes or ringing in the ears. As with any medicine, there is a very remote chance of a vaccine causing a severe allergic reaction, other serious injury, or death. 5. What if there is a serious problem? An allergic reaction could occur after the vaccinated person leaves the clinic.  If you see signs of a severe allergic reaction (hives, swelling of the face and throat, difficulty breathing, a fast heartbeat, dizziness, or weakness), call 9-1-1 and get the person to the nearest hospital. 
 
For other signs that concern you, call your health care provider. Adverse reactions should be reported to the Vaccine Adverse Event Reporting System (VAERS). Your health care provider will usually file this report, or you can do it yourself. Visit the VAERS website at www.vaers. [facility].gov or call 7-923.277.1233. VAERS is only for reporting reactions, and VAERS staff do not give medical advice. 6. The National Vaccine Injury Compensation Program 
 
The [facility] Vaccine Injury Compensation Program (VICP) is a federal program that was created to compensate people who may have been injured by certain vaccines. Visit the VICP website at www.[facility].gov/vaccinecompensation or call 3-594.923.4250 to learn about the program and about filing a claim. There is a time limit to file a claim for compensation. 7. How can I learn more?  Ask your health care provider.  Call your local or state health department.  Contact the Centers for Disease Control and Prevention (CDC): 
- Call 1-846.206.5938 (1-234-LFM-INFO) or 
- Visit CDCs influenza website at www.cdc.gov/flu Vaccine Information Statement (Interim) Inactivated Influenza Vaccine 8/15/2019 
42 BAR[address] [place] [id number] Department of Health and [place] [employer] Centers for Disease Control and Prevention Office Use Only Plantar Fasciitis: Exercises Introduction Here are some examples of exercises for you to try. The exercises may be suggested for a condition or for rehabilitation. Start each exercise slowly. Ease off the exercises if you start to have pain. You will be told when to start these exercises and which ones will work best for you. How to do the exercises Towel stretch 1. Sit with your legs extended and knees straight. 2. Place a towel around your foot just under the toes. 3. Hold each end of the towel in each hand, with your hands above your knees.  
4. Pull back with the towel so that your foot stretches toward you. 5. Hold the position for at least 15 to 30 seconds. 6. Repeat 2 to 4 times a session, up to 5 sessions a day. Calf stretch This exercise stretches the muscles at the back of the lower leg (the calf) and the Achilles tendon. Do this exercise 3 or 4 times a day, 5 days a week. 1. Stand facing a wall with your hands on the wall at about eye level. Put the leg you want to stretch about a step behind your other leg. 2. Keeping your back heel on the floor, bend your front knee until you feel a stretch in the back leg. 3. Hold the stretch for 15 to 30 seconds. Repeat 2 to 4 times. Plantar fascia and calf stretch Stretching the plantar fascia and calf muscles can increase flexibility and decrease heel pain. You can do this exercise several times each day and before and after activity. 1. Stand on a step as shown above. Be sure to hold on to the banister. 2. Slowly let your heels down over the edge of the step as you relax your calf muscles. You should feel a gentle stretch across the bottom of your foot and up the back of your leg to your knee. 3. Hold the stretch about 15 to 30 seconds, and then tighten your calf muscle a little to bring your heel back up to the level of the step. Repeat 2 to 4 times. Towel curls Make this exercise more challenging by placing a weighted object, such as a soup can, on the other end of the towel. 1. While sitting, place your foot on a towel on the floor and scrunch the towel toward you with your toes. 2. Then, also using your toes, push the towel away from you. [place] pickups 1. Put marbles on the floor next to a cup. 
2. Using your toes, try to lift the marbles up from the floor and put them in the cup. Follow-up care is a key part of your treatment and safety. Be sure to make and go to all appointments, and call your doctor if you are having problems.  It's also a good idea to know your test results and keep a list of the medicines you take. Where can you learn more? Go to http://www.gray.com/ Justus Ziegler in the search box to learn more about \"Plantar Fasciitis: Exercises. \" Current as of: March 2, 2020               Content Version: 12.6 © 2006-2020 [employer]. Care instructions adapted under license by [employer] (which disclaims liability or warranty for this information). If you have questions about a medical condition or this instruction, always ask your healthcare professional. [address] any warranty or liability for your use of this information. Plantar Fasciitis: Care Instructions Your Care Instructions Plantar fasciitis is pain and inflammation of the plantar fascia, the tissue at the bottom of your foot that connects the heel bone to the toes. The plantar fascia also supports the arch. If you strain the plantar fascia, it can develop small tears and cause heel pain when you stand or walk. Plantar fasciitis can be caused by running or other sports. It also may occur in people who are overweight or who have high arches or flat feet. You may get plantar fasciitis if you walk or stand for long periods, or have a tight Achilles tendon or calf muscles. You can improve your foot pain with rest and other care at home. It might take a few weeks to a few months for your foot to heal completely. Follow-up care is a key part of your treatment and safety. Be sure to make and go to all appointments, and call your doctor if you are having problems. It's also a good idea to know your test results and keep a list of the medicines you take. How can you care for yourself at home? · Rest your feet often. Reduce your activity to a level that lets you avoid pain. If possible, do not run or walk on hard surfaces. · Take pain medicines exactly as directed. ? If the doctor gave you a prescription medicine for pain, take it as prescribed.  
? If you are not taking a prescription pain medicine, take an over-the-counter anti-inflammatory medicine for pain and swelling, such as ibuprofen (Advil, Motrin) or naproxen (Aleve). Read and follow all instructions on the label. · Use ice massage to help with pain and swelling. You can use an ice cube or an ice cup several times a day. To make an ice cup, fill a paper cup with water and freeze it. Cut off the top of the cup until a half-inch of ice shows. Hold onto the remaining paper to use the cup. Rub the ice in small circles over the area for 5 to 7 minutes. · Contrast baths, which alternate hot and cold water, can also help reduce swelling. But because heat alone may make pain and swelling worse, end a contrast bath with a soak in cold water. · Wear a night splint if your doctor suggests it. A night splint holds your foot with the toes pointed up and the foot and ankle at a 90-degree angle. This position gives the bottom of your foot a constant, gentle stretch. · Do simple exercises such as calf stretches and towel stretches 2 to 3 times each day, especially when you first get up in the morning. These can help the plantar fascia become more flexible. They also make the muscles that support your arch stronger. Hold these stretches for 15 to 30 seconds per stretch. Repeat 2 to 4 times. ? Stand about 1 foot from a wall. Place the palms of both hands against the wall at chest level. Lean forward against the wall, keeping one leg with the knee straight and heel on the ground while bending the knee of the other leg. 
? Sit down on the floor or a mat with your feet stretched in front of you. Roll up a towel lengthwise, and loop it over the ball of your foot. Holding the towel at both ends, gently pull the towel toward you to stretch your foot. · Wear shoes with good arch support. Athletic shoes or shoes with a well-cushioned sole are good choices. · Try heel cups or shoe inserts (orthotics) to help cushion your heel. You can buy these at many shoe stores. · Put on your shoes as soon as you get out of bed. Going barefoot or wearing slippers may make your pain worse. · Reach and stay at a good weight for your height. This puts less strain on your feet. When should you call for help? Call your doctor now or seek immediate medical care if: 
  · You have heel pain with fever, redness, or warmth in your heel.  
  · You cannot put weight on the sore foot. Watch closely for changes in your health, and be sure to contact your doctor if: 
  · You have numbness or tingling in your heel.  
  · Your heel pain lasts more than 2 weeks. Where can you learn more? Go to http://www.[employer]/ Nino Schaefer in the search box to learn more about \"Plantar Fasciitis: Care Instructions. \" Current as of: March 2, 2020               Content Version: 12.6 © 3489-6049 [employer]. Care instructions adapted under license by [employer] [place] (which disclaims liability or warranty for this information). If you have questions about a medical condition or this instruction, always ask your healthcare professional. [place] [address] any warranty or liability for your use of this information.

## 2021-03-08 NOTE — PROGRESS NOTES
Maritza Paul is a 22y.o. year old female seen in clinic today for   Chief Complaint   Patient presents with   [facility] ED Follow-up    Immunization/Injection       she is here today to establish care and discuss multiple issues. Seen in ED last week for abd pain and bloating x 3 months. Had neg UA, normal LFTs, normal lipase, neg pregnancy test, slightly elevated wbc and an elevated TSH. She claims her stomach \"feels hard\". She notes no resolution with Gas-x and Midol. Recently had ED trip. Had bloating. 3 months, stomach feels hard. Tried Gas-X and Midol. Exercise: Walks for an hour, 30 minute exercise class   Diet: Lots of cheese, occasional pizza, no red meat. After first child has had consistent weight gain since then. Small snacks throughout the day. She notes she weighed 130 pounds then after first child 180 then after second child \"its been crazy\". She reports bowel movements 2-3 times a day, not firm, not loose \"just normal\" with no blood or abnormal coloring. Not sleeping well. Averaging 2 hours of sleep a day. Has tried Xanax for sleep. On and off for the past 2 years. Has 4 kids at home cares for them throughout the day. New acid reflux with nausea. Lasted 2 years or so. Worse with acidic foods. Frequent urination, going to the bathroom 15-20 times a day. No blood in the urine. No burning. Bottoms of her feet hurt in AM. Resolve when she puts shoes on and walk around. No injury or trauma    she specifically denies any CP, SOB, HA. Dizziness, fevers, chills, N/V/D,  or other bowel changes. Current Outpatient Medications on File Prior to Visit   Medication Sig Dispense Refill    dicyclomine (BENTYL) 20 mg tablet Take 1 Tab by mouth every six (6) hours as needed for Abdominal Cramps. 20 Tab 0    famotidine (PEPCID) 20 mg tablet Take 20 mg by mouth two (2) times a day.  aspirin 81 mg chewable tablet Take 81 mg by mouth daily.       PNV w-CA No.37-Iron-FA-Omega-3 (OB- ONE) 27-1-330 mg cap Take 1 Tab by mouth. Indications: PREGNANCY       No current facility-administered medications on file prior to visit.           No Known Allergies  Past Medical History:   Diagnosis Date    Acquired hypothyroidism 3/8/2021    Hearing disorder     Hepatic steatosis 3/8/2021    Psychiatric problem     depression      Past Surgical History:   Procedure Laterality Date    HX ORTHOPAEDIC      left foot fracture    HX OTHER SURGICAL      mouth surgery    HX OTHER SURGICAL      wisdom teeth     HX OTHER SURGICAL      myrengotomy 2010        Family History   Problem Relation Age of Onset    Stroke Mother     Hypertension Father     Cancer Maternal Aunt         ovarian, breast    Hypertension Maternal Aunt     No Known Problems Maternal Uncle     Cancer Paternal Aunt         breast    Thyroid Disease Paternal Aunt     Cancer Paternal Uncle         benign brain tumor, lung    Hypertension Paternal Uncle     Depression Paternal Uncle     Suicide Paternal Uncle         Social History     Socioeconomic History    Marital status: SINGLE     Spouse name: Not on file    Number of children: Not on file    Years of education: Not on file    Highest education level: Not on file   Occupational History    Not on file   Social Needs    Financial resource strain: Not on file    Food insecurity     Worry: Not on file     Inability: Not on file    Transportation needs     Medical: Not on file     Non-medical: Not on file   Tobacco Use    Smoking status: Former Smoker     Quit date: 2014     Years since quittin.8    Smokeless tobacco: Never Used    Tobacco comment: 1-2 cigarettes a day   Substance and Sexual Activity    Alcohol use: No    Drug use: No    Sexual activity: Never     Partners: Male   Lifestyle    Physical activity     Days per week: Not on file     Minutes per session: Not on file    Stress: Not on file   Relationships    Social connections     Talks on phone: Not on file     Gets together: Not on file     Attends Jewish service: Not on file     Active member of club or organization: Not on file     Attends meetings of clubs or organizations: Not on file     Relationship status: Not on file    Intimate partner violence     Fear of current or ex partner: Not on file     Emotionally abused: Not on file     Physically abused: Not on file     Forced sexual activity: Not on file   Other Topics Concern    Not on file   Social History Narrative    Not on file           Visit Vitals  /83 (BP 1 Location: Left upper arm, BP Patient Position: Sitting)   Pulse (!) 103   Temp 98.6 °F (37 °C) (Oral)   Resp 12   Ht 5' 5\" (1.651 m)   Wt 314 lb (142.4 kg)   SpO2 96%   BMI 52.25 kg/m²       Review of Systems   Constitutional: Negative for chills, fever, malaise/fatigue and weight loss. HENT: Negative for ear pain, hearing loss and sore throat. Respiratory: Negative for cough and shortness of breath. Cardiovascular: Negative for chest pain and leg swelling. Gastrointestinal: Positive for abdominal pain and heartburn. Negative for blood in stool, constipation, diarrhea, melena, nausea and vomiting. Genitourinary: Positive for frequency. Negative for dysuria and hematuria. Musculoskeletal: Positive for myalgias. Negative for back pain. Skin: Negative for rash. Neurological: Negative for weakness and headaches. Psychiatric/Behavioral: Negative for depression. The patient has insomnia. Physical Exam  Vitals signs and nursing note reviewed. Constitutional:       General: She is not in acute distress. Appearance: Normal appearance. She is obese. She is not ill-appearing. HENT:      Head: Normocephalic and atraumatic.       Right Ear: Ear canal and external ear normal.      Left Ear: Ear canal and external ear normal.      Nose: Nose normal.      Mouth/Throat:      Mouth: Mucous membranes are moist.      Pharynx: Oropharynx is clear. Eyes:      Conjunctiva/sclera: Conjunctivae normal.      Pupils: Pupils are equal, round, and reactive to light. Neck:      Musculoskeletal: Normal range of motion and neck supple. No neck rigidity. Vascular: No carotid bruit. Cardiovascular:      Rate and Rhythm: Normal rate and regular rhythm. Pulses: Normal pulses. Heart sounds: Normal heart sounds. Pulmonary:      Effort: Pulmonary effort is normal.      Breath sounds: Normal breath sounds. No wheezing, rhonchi or rales. Abdominal:      General: Abdomen is flat. Bowel sounds are normal. There is no distension. Palpations: There is no mass. Tenderness: There is abdominal tenderness (mild diffuse abdominal tenderness ). There is no guarding or rebound. Musculoskeletal: Normal range of motion. Skin:     General: Skin is warm and dry. Capillary Refill: Capillary refill takes less than 2 seconds. Neurological:      General: No focal deficit present. Mental Status: She is alert and oriented to person, place, and time. Sensory: No sensory deficit. Gait: Gait normal.   Psychiatric:         Mood and Affect: Mood normal.         Behavior: Behavior normal.         Thought Content: Thought content normal.          No results found for this or any previous visit (from the past 24 hour(s)). ASSESSMENT AND PLAN  Diagnoses and all orders for this visit:    1. Needs flu shot  -     INFLUENZA VIRUS VAC QUAD,SPLIT,PRESV FREE SYRINGE IM    2. Acquired hypothyroidism  -     levothyroxine (SYNTHROID) 25 mcg tablet; Take 1 Tab by mouth Daily (before breakfast). -     TSH 3RD GENERATION; Future  -     T4, FREE; Future  Lab Results   Component Value Date/Time    TSH 7.63 (H) 02/11/2021 06:43 PM     Will begin low dose 25 mcg synthroid. Sent out with labs. Follow up in 6 weeks for lab review  3.  Hepatic steatosis  Due to morbid obesity, she is currently exercising a good bit but needs to work on low carb diet  4. Bilateral plantar fasciitis  Instructed to get better shoes and inserts. Given stretches. Offered PT/night splints she declined  5. Gastroesophageal reflux disease without esophagitis  -     omeprazole (PRILOSEC) 40 mg capsule; Take 1 Cap by mouth daily. She has GI referral for GERD and for abdominal pain/bloating with normal CT. 6. Overactive bladder  -     REFERRAL TO UROLOGY  Given two deliveries I feel she is having incomplete emptying. Will send to Urology to confirm. May be contributing overall to bloating sensation  7. Abdominal bloating  GI referral placed  8. Primary insomnia  -     traZODone (DESYREL) 50 mg tablet; Take 1 Tab by mouth nightly. Will trial low dose trazodone  9. Morbid obesity with BMI of 50.0-59.9, adult ([facility] [address]  -     LIPID PANEL; Future  Needs to cut out carbs watch portions. I have discussed the diagnosis with the patient and the intended plan as seen in the above orders. Patient is in agreement. The patient has received an after-visit summary and questions were answered concerning future plans. I have discussed medication side effects and warnings with the patient as well.     Judith Shay PA-C

## 2021-03-10 ENCOUNTER — TELEPHONE (OUTPATIENT)
Dept: INTERNAL MEDICINE CLINIC | Age: 26
End: 2021-03-10

## 2021-03-10 DIAGNOSIS — G47.00 INSOMNIA, UNSPECIFIED TYPE: Primary | ICD-10-CM

## 2021-03-10 RX ORDER — ZOLPIDEM TARTRATE 5 MG/1
5 TABLET ORAL
Qty: 30 TAB | Refills: 0 | Status: SHIPPED | OUTPATIENT
Start: 2021-03-10 | End: 2021-04-06

## 2021-03-10 NOTE — TELEPHONE ENCOUNTER
Please inform patient I will send in 30 day trial run of Ambien 5 mg. To be taken 30 minutes before bed time. She should look at potential side effects before taking it. Follow up with me in 30 days if she wants to continue using it.

## 2021-03-10 NOTE — TELEPHONE ENCOUNTER
Verified patients name and date of birth. Patient states she has taken trazodone for the last 2 nights and has not be able to sleep, states trazodone has had \"opposite effect\" on her and she was up all night. Patient is requesting alternate medication be called to [facility] in [place].

## 2021-03-30 ENCOUNTER — TELEPHONE (OUTPATIENT)
Dept: INTERNAL MEDICINE CLINIC | Age: 26
End: 2021-03-30

## 2021-03-30 NOTE — TELEPHONE ENCOUNTER
Please advise patient to use 2 ambien  (=10 mg) per night to see if that helps before we switch medications

## 2021-03-30 NOTE — TELEPHONE ENCOUNTER
Patient called and scheduled an appointment in April, but the medication she was prescribed is no longer working like it was when she first started taking it. Patient said she is falling asleep later and later each night. Please give patient a call back.     BCB# [phone]

## 2021-04-01 ENCOUNTER — TRANSCRIBE ORDER (OUTPATIENT)
Dept: SCHEDULING | Age: 26
End: 2021-04-01

## 2021-04-01 DIAGNOSIS — R63.5 WEIGHT GAIN, ABNORMAL: Primary | ICD-10-CM

## 2021-04-01 DIAGNOSIS — R10.13 EPIGASTRIC DISCOMFORT: ICD-10-CM

## 2021-04-01 DIAGNOSIS — R14.0 BLOATING: ICD-10-CM

## 2021-04-01 NOTE — TELEPHONE ENCOUNTER
Adrian Mariee PA-C  You 18 hours ago (5:25 PM)     Needs follow up if maxed out on that dose    Message text

## 2021-04-05 NOTE — PROGRESS NOTES
Jolene Powers is a 22y.o. year old female seen in clinic today for   Chief Complaint   Patient presents with    Insomnia       she is here today to follow up for insomnia. Has been using 10 mg Ambien without resolution. She states she would use the 5 mg and have 2-3 good nights in a row but then it would stop. Then she began using 10 mg and had 2-3 good nights before it stopped working. Since our first meeting she has seen a Urologist who recommended pelvic floor PT.  GI, has EGD scheduled next week. Is now planning to work with psychology for sleep cycle. Was advised to stop working so much at night and begin relaxing earlier. Only go to bed when she is ready to fall asleep. She has begun doing the whole 30 diet. She notes her back has been bothering her without much relief from ibuprofen. It is now radiating down her legs BL. She notes her plantar fasciitis has also worsened to the point it hurts beyond the first hour of the day. Had been taking trazodone in AM with opposite effect and it wakes her up. Current Outpatient Medications on File Prior to Visit   Medication Sig Dispense Refill    levothyroxine (SYNTHROID) 25 mcg tablet Take 1 Tab by mouth Daily (before breakfast). 30 Tab 1    omeprazole (PRILOSEC) 40 mg capsule Take 1 Cap by mouth daily. 30 Cap 2    [DISCONTINUED] zolpidem (AMBIEN) 5 mg tablet Take 1 Tab by mouth nightly as needed for Sleep. Max Daily Amount: 5 mg. (Patient taking differently: Take 10 mg by mouth nightly as needed for Sleep.) 30 Tab 0    [DISCONTINUED] traZODone (DESYREL) 50 mg tablet Take 1 Tab by mouth nightly. 30 Tab 2     No current facility-administered medications on file prior to visit.           No Known Allergies  Past Medical History:   Diagnosis Date    Acquired hypothyroidism 3/8/2021    Hearing disorder     Hepatic steatosis 3/8/2021    Psychiatric problem     depression      Past Surgical History:   Procedure Laterality Date    HX ORTHOPAEDIC      left foot fracture    HX OTHER SURGICAL      mouth surgery    HX OTHER SURGICAL      wisdom teeth 2013    HX OTHER SURGICAL      myrengotomy 2010        Family History   Problem Relation Age of Onset    Stroke Mother     Hypertension Father     Cancer Maternal Aunt         ovarian, breast    Hypertension Maternal Aunt     No Known Problems Maternal Uncle     Cancer Paternal Aunt         breast    Thyroid Disease Paternal Aunt     Cancer Paternal Uncle         benign brain tumor, lung    Hypertension Paternal Uncle     Depression Paternal Uncle     Suicide Paternal Uncle         Social History     Socioeconomic History    Marital status: SINGLE     Spouse name: Not on file    Number of children: Not on file    Years of education: Not on file    Highest education level: Not on file   Occupational History    Not on file   Social Needs    Financial resource strain: Not on file    Food insecurity     Worry: Not on file     Inability: Not on file    Transportation needs     Medical: Not on file     Non-medical: Not on file   Tobacco Use    Smoking status: Former Smoker     Quit date: 2014     Years since quittin.8    Smokeless tobacco: Current User    Tobacco comment: gum   Substance and Sexual Activity    Alcohol use: No    Drug use: No    Sexual activity: Never     Partners: Male   Lifestyle    Physical activity     Days per week: Not on file     Minutes per session: Not on file    Stress: Not on file   Relationships    Social connections     Talks on phone: Not on file     Gets together: Not on file     Attends Baptism service: Not on file     Active member of club or organization: Not on file     Attends meetings of clubs or organizations: Not on file     Relationship status: Not on file    Intimate partner violence     Fear of current or ex partner: Not on file     Emotionally abused: Not on file     Physically abused: Not on file     Forced sexual activity: Not on file   Other Topics Concern    Not on file   Social History Narrative    Not on file           Visit Vitals  /64 (BP 1 Location: Left upper arm, BP Patient Position: Sitting)   Pulse 96   Temp 98.5 °F (36.9 °C) (Oral)   Resp 12   Ht 5' 5\" (1.651 m)   Wt 315 lb (142.9 kg)   LMP  (Approximate) Comment: 3 years ago   SpO2 97%   BMI 52.42 kg/m²       Review of Systems   Constitutional: Negative for chills, fever and weight loss. Respiratory: Negative for cough and sputum production. Cardiovascular: Negative for chest pain. Gastrointestinal: Negative for nausea and vomiting. Genitourinary: Negative for hematuria. Musculoskeletal: Positive for back pain and myalgias. Skin: Negative for rash. Neurological: Negative for seizures, weakness and headaches. Psychiatric/Behavioral: The patient has insomnia. Physical Exam  Vitals signs and nursing note reviewed. Constitutional:       Appearance: Normal appearance. She is obese. HENT:      Head: Normocephalic and atraumatic. Right Ear: Tympanic membrane, ear canal and external ear normal.      Left Ear: Tympanic membrane, ear canal and external ear normal.      Nose: Nose normal.      Mouth/Throat:      Mouth: Mucous membranes are moist.      Pharynx: Oropharynx is clear. Eyes:      Conjunctiva/sclera: Conjunctivae normal.      Pupils: Pupils are equal, round, and reactive to light. Neck:      Musculoskeletal: Normal range of motion and neck supple. No neck rigidity. Vascular: No carotid bruit. Cardiovascular:      Rate and Rhythm: Normal rate and regular rhythm. Pulses: Normal pulses. Heart sounds: Normal heart sounds. Pulmonary:      Effort: Pulmonary effort is normal.      Breath sounds: Normal breath sounds. No wheezing, rhonchi or rales. Abdominal:      General: Abdomen is flat. Bowel sounds are normal. There is no distension. Palpations: There is no mass. Tenderness:  There is no abdominal tenderness. There is no guarding or rebound. Musculoskeletal:      Lumbar back: She exhibits decreased range of motion and tenderness (BL lumbar muscular tenderness). Skin:     General: Skin is warm and dry. Capillary Refill: Capillary refill takes less than 2 seconds. Neurological:      General: No focal deficit present. Mental Status: She is alert and oriented to person, place, and time. Sensory: No sensory deficit. Gait: Gait normal.   Psychiatric:         Mood and Affect: Mood normal.         Behavior: Behavior normal.         Thought Content: Thought content normal.          No results found for this or any previous visit (from the past 24 hour(s)). ASSESSMENT AND PLAN  Diagnoses and all orders for this visit:    1. Primary insomnia  -     ramelteon (ROZEREM) 8 mg tablet; Take 1 Tab by mouth nightly. Begin trial of Rozerem. Continue CBT with psychologist  2. Plantar fascial fibromatosis of both feet  -     AMB SUPPLY ORDER  Sent in BL night splint for feet  3. Bilateral sciatica  Given stretches. Advised ibuprofen with food. Offered muscle relaxant         I have discussed the diagnosis with the patient and the intended plan as seen in the above orders. Patient is in agreement. The patient has received an after-visit summary and questions were answered concerning future plans. I have discussed medication side effects and warnings with the patient as well.     Faisal Royal PA-C

## 2021-04-06 ENCOUNTER — OFFICE VISIT (OUTPATIENT)
Dept: INTERNAL MEDICINE CLINIC | Age: 26
End: 2021-04-06
Payer: MEDICAID

## 2021-04-06 VITALS
BODY MASS INDEX: 48.82 KG/M2 | RESPIRATION RATE: 12 BRPM | DIASTOLIC BLOOD PRESSURE: 64 MMHG | HEART RATE: 96 BPM | HEIGHT: 65 IN | SYSTOLIC BLOOD PRESSURE: 115 MMHG | TEMPERATURE: 98.5 F | WEIGHT: 293 LBS | OXYGEN SATURATION: 97 %

## 2021-04-06 DIAGNOSIS — M72.2 PLANTAR FASCIAL FIBROMATOSIS OF BOTH FEET: ICD-10-CM

## 2021-04-06 DIAGNOSIS — F51.01 PRIMARY INSOMNIA: Primary | ICD-10-CM

## 2021-04-06 DIAGNOSIS — M54.31 BILATERAL SCIATICA: ICD-10-CM

## 2021-04-06 DIAGNOSIS — M54.32 BILATERAL SCIATICA: ICD-10-CM

## 2021-04-06 PROCEDURE — 99213 OFFICE O/P EST LOW 20 MIN: CPT | Performed by: PHYSICIAN ASSISTANT

## 2021-04-06 RX ORDER — RAMELTEON 8 MG/1
8 TABLET ORAL
Qty: 30 TAB | Refills: 2 | Status: SHIPPED | OUTPATIENT
Start: 2021-04-06 | End: 2021-04-09

## 2021-04-06 NOTE — PATIENT INSTRUCTIONS
Back Stretches: Exercises Introduction Here are some examples of exercises for stretching your back. Start each exercise slowly. Ease off the exercise if you start to have pain. Your doctor or physical therapist will tell you when you can start these exercises and which ones will work best for you. How to do the exercises Overhead stretch 1. Stand comfortably with your feet shoulder-width apart. 2. Looking straight ahead, raise both arms over your head and reach toward the ceiling. Do not allow your head to tilt back. 3. Hold for 15 to 30 seconds, then lower your arms to your sides. 4. Repeat 2 to 4 times. Side stretch 1. Stand comfortably with your feet shoulder-width apart. 2. Raise one arm over your head, and then lean to the other side. 3. Slide your hand down your leg as you let the weight of your arm gently stretch your side muscles. Hold for 15 to 30 seconds. 4. Repeat 2 to 4 times on each side. Press-up 1. Lie on your stomach, supporting your body with your forearms. 2. Press your elbows down into the floor to raise your upper back. As you do this, relax your stomach muscles and allow your back to arch without using your back muscles. As your press up, do not let your hips or pelvis come off the floor. 3. Hold for 15 to 30 seconds, then relax. 4. Repeat 2 to 4 times. Relax and rest  
1. Lie on your back with a rolled towel under your neck and a pillow under your knees. Extend your arms comfortably to your sides. 2. Relax and breathe normally. 3. Remain in this position for about 10 minutes. 4. If you can, do this 2 or 3 times each day. Follow-up care is a key part of your treatment and safety. Be sure to make and go to all appointments, and call your doctor if you are having problems. It's also a good idea to know your test results and keep a list of the medicines you take. Where can you learn more? Go to http://www.gray.com/ Enter C621 in the search box to learn more about \"Back Stretches: Exercises. \" Current as of: November 16, 2020               Content Version: 12.8 © 2006-2021 [employer]. Care instructions adapted under license by [employer] (which disclaims liability or warranty for this information). If you have questions about a medical condition or this instruction, always ask your healthcare professional. [address] any warranty or liability for your use of this information. Therapeutic Ball: Back Exercises Introduction Here are some examples of typical exercises for your condition. Start each exercise slowly. Ease off the exercise if you start to have pain. Your doctor or physical therapist will tell you when you can start these exercises and which ones will work best for you. To prepare, make sure that your ball is the right size for you. When inflated and firm, it should allow you to sit with your hips and knees bent at about a 90-degree angle (like the letter L). How to do the exercises Seated position on ball 1. Use this exercise to get used to moving on the ball and to find your best sitting position. 2. Sit comfortably on the ball with your feet about hip-width apart. If you feel unsteady, rest your hands on the ball near your hips. 3. As you do this exercise, try to keep your shoulders and upper body relaxed and still. 4. Using your stomach and back muscles to move your pelvis, roll the ball forward. This will round your back. 5. Still using your stomach and back muscles, roll the ball back. You will arch your back. 6. Repeat this rounding-arching motion a few times. 7. Stop in between the two positions, where your back is not rounded or arched. This is called your neutral position. Pelvic rotation 1. Sit tall on the ball. 2. Slowly rotate your hips in a [place] pattern. Keep the movement focused at your hips. 3. Repeat, but [place] in the other direction. 4. Repeat 8 to 12 times. Postural sitting 1. Use this position to find a stable, relaxed posture on the ball. You can use this position as your starting point for other ball exercises. If you feel unsteady on the ball, start on a chair first. 
2. Sit on a ball or chair, with your feet planted straight in front of you. 3. Imagine that a string at the top of your head is pulling you straight up. Think of yourself as 2 inches taller than you are. 
4. Slightly tuck your chin. 5. Keep your shoulders back and relaxed. Knee extension 1. Sit tall on the ball with your feet planted in front of you, hip-width apart. As you do this exercise, avoid slumping your shoulders and arching your back. 2. Rest your hands on the ball near your hip or a steady object next to you. (If you feel very stable on the ball, rest your hands in your lap or at your side.) 3. Slowly straighten one leg at the knee. Slowly lower it back down. Repeat with the other leg. 4. Repeat this exercise 8 to 12 times. Roll-ups 1. Lie on your back with your knees bent, feet resting on the floor. 2. Lay the ball on your thighs. Rest your hands up high on the ball. 3. Raising your head and shoulder blades, roll the ball up your thighs. Exhale as you roll up. 4. If this is hard on your neck, gently support your lower head and upper neck with one hand. Don't use that hand to pull your head up. 5. Repeat 8 to 12 times. Ball curls 1. Lie on your back with your ankles resting on the ball, knees straight. 2. Use your legs to roll the exercise ball toward you. Allow your knees to bend and move closer to your chest. 
3. Pause briefly, and then roll the ball to the starting position. Try to keep the ball rolling straight. You will feel the muscles in your lower belly working. 4. Repeat 8 to 12 times. Bridge with ball under legs 1. Lie on your back with your legs up, calves resting on the ball.  For more challenge, rest your heels on the ball. 
2. Look up at the ceiling, and keep your chin relaxed. You can place a small pillow under your head or neck for comfort. 3. With your arms by your side, press your hands onto the floor for stability. 4. Tighten your belly muscles by pulling in your belly button toward your spine. 5. Push your heels down toward the floor, squeeze your buttocks, and lift your hips off the floor until your shoulders, hips, and knees are all in a straight line. 6. Try to keep the ball steady. Hold for about 6 seconds as you continue to breathe normally. 7. Slowly lower your hips back down to the floor. 8. Repeat 8 to 12 times. Ball curls with bridge 1. Start flat on your back with your ankles resting on the ball. 2. Look up at the ceiling, and keep your chin relaxed. You can place a small pillow under your head or neck for comfort. 3. With your arms by your side, press your hands onto the floor for stability. 4. Tighten your belly muscles by pulling in your belly button toward your spine. 5. Push your heels down toward the floor, squeeze your buttocks, and lift your hips off the floor until your shoulders, hips, and knees are all in a straight line. 6. While holding the bridge position, roll the ball toward you with your heels. Keep your hips as level as you can. 7. Pause briefly, and then roll the ball back out. Try to keep the ball rolling straight. You will feel the muscles in your lower belly working as you straighten your legs. 8. Lower your hips, and return to your starting position. 9. Repeat 8 to 12 times. 10. When you can keep your body and the ball steady throughout this exercise, you're ready for more challenge. Try keeping your hips raised while rolling the ball out, holding the bridge, and rolling back, a few times in a row. Praying isabella 1. Kneel upright with the ball in front of you. 2. To start, clasp your hands together. Rest them on the ball in front of you.  
3. As you do this exercise, keep your back and hips straight and tighten your belly and buttocks muscles. Keep your knees in place. 4. Press on the ball with your arms. Lean forward from the knees. This rolls the ball forward. You will bear most of your weight on your arms. 5. If your back starts to ache, you've gone too far. Pull back a bit. 6. Roll back to the start position. 7. Repeat 8 to 12 times. Walk-out plank on ball 1. Kneel over the ball. Place your hands on the floor in front of you. 2. Walk your hands forward until your legs are straight on the ball. This is the plank position. 3. When in plank position, hold your body straight and tighten your belly and buttocks muscles. Keep your chin slightly tucked. 4. Roll as far forward as you can without losing your balance or letting your hips drop. You may stop with the ball under your thighs, or even under your knees or shins. 5. Hold a few seconds, then walk your hands back and return to the start position. 6. Repeat 8 to 12 times. Push-up with thighs on ball 1. Kneel over the ball. Place your hands on the floor in front of you. 2. Walk your hands forward until your legs are straight on the ball. This is the plank position. 3. When in plank position, hold your body straight and tighten your belly and buttocks muscles. Keep your chin slightly tucked. 4. Roll as far forward as you can without losing your balance or letting your hips drop. You may stop with the ball under your thighs, or even under your knees or shins. 5. Bend your elbows. Slowly lower your body toward the ground as far as you can without losing your balance. 6. If your wrists hurt, try moving your hands a little farther apart so they're not right under your shoulders. 7. Slowly straighten your arms. 8. Do 8 to 12 of these push-ups. Wall squat with ball 1. Stand facing away from a wall. Place your feet about shoulder-width apart.  
2. Place the ball between your middle back and the wall. Move your feet out in front of you so they are about a foot in front of your hips. 3. Keep your arms at your sides, or put your hands on your hips. 4. Slowly squat down as if you are going to sit in a chair, rolling your back over the ball as you squat. The ball should move with you but stay pressed into the wall. 5. Be sure that your knees do not go in front of your toes as you squat. 6. Hold for 6 seconds. 7. Slowly rise to your standing position. 8. Repeat 8 to 12 times. Child's pose with ball 1. Kneeling upright with your back straight, rest your hands on the ball in front of you. 2. Breathe out as you bend at the hips, and roll the ball forward. Lower your chest toward the ground, and drop your hips back toward your heels. 3. To stretch your upper back and shoulders, hold this position for 15 to 30 seconds. 4. Repeat 2 to 4 times. Follow-up care is a key part of your treatment and safety. Be sure to make and go to all appointments, and call your doctor if you are having problems. It's also a good idea to know your test results and keep a list of the medicines you take. Where can you learn more? Go to http://www.gray.com/ Enter T196 in the search box to learn more about \"Therapeutic Ball: Back Exercises. \" Current as of: November 16, 2020               Content Version: 12.8 © 3682-6531 [employer]. Care instructions adapted under license by [employer] (which disclaims liability or warranty for this information). If you have questions about a medical condition or this instruction, always ask your healthcare professional. [place] [address] any warranty or liability for your use of this information.

## 2021-04-06 NOTE — PROGRESS NOTES
Tisha Gomez  Identified pt with two pt identifiers(name and ). Chief Complaint   Patient presents with    Insomnia       Reviewed record In preparation for visit and have obtained necessary documentation. 1. Have you been to the ER, urgent care clinic or hospitalized since your last visit? No     2. Have you seen or consulted any other health care providers outside of the [address] [place] [address] since your last visit? Include any pap smears or colon screening. No    Vitals reviewed with provider.     Health Maintenance reviewed:     Health Maintenance Due   Topic    Hepatitis C Screening     HPV Age 9Y-34Y (2 - 2-dose series)    COVID-19 Vaccine (1)    PAP AKA CERVICAL CYTOLOGY           Wt Readings from Last 3 Encounters:   21 315 lb (142.9 kg)   21 314 lb (142.4 kg)   21 308 lb 6.8 oz (139.9 kg)        Temp Readings from Last 3 Encounters:   21 98.5 °F (36.9 °C) (Oral)   21 98.6 °F (37 °C) (Oral)   21 98.5 °F (36.9 °C)        BP Readings from Last 3 Encounters:   21 115/64   21 131/83   21 (!) 161/55        Pulse Readings from Last 3 Encounters:   21 96   21 (!) 103   21 93        Vitals:    21 0941   BP: 115/64   Pulse: 96   Resp: 12   Temp: 98.5 °F (36.9 °C)   TempSrc: Oral   SpO2: 97%   Weight: 315 lb (142.9 kg)   Height: 5' 5\" (1.651 m)   PainSc:   0 - No pain          Learning Assessment:   :       Learning Assessment 3/8/2021   PRIMARY LEARNER Patient   HIGHEST LEVEL OF EDUCATION - PRIMARY LEARNER  SOME COLLEGE   BARRIERS PRIMARY LEARNER NONE   CO-LEARNER CAREGIVER No   PRIMARY LANGUAGE ENGLISH   LEARNER PREFERENCE PRIMARY DEMONSTRATION   ANSWERED BY patient   RELATIONSHIP SELF        Depression Screening:   :       3 most recent PHQ Screens 2021   Little interest or pleasure in doing things More than half the days   Feeling down, depressed, irritable, or hopeless Not at all   Total Score PHQ 2 2 Fall Risk Assessment:   :     No flowsheet data found. Abuse Screening:   :     No flowsheet data found.      ADL Screening:   :       ADL Assessment 3/8/2021   Feeding yourself No Help Needed   Getting from bed to chair No Help Needed   Getting dressed No Help Needed   Bathing or showering No Help Needed   Walk across the room (includes cane/walker) No Help Needed   Using the telphone No Help Needed   Taking your medications No Help Needed   Preparing meals No Help Needed   Managing money (expenses/bills) No Help Needed   Moderately strenuous housework (laundry) No Help Needed   Shopping for personal items (toiletries/medicines) No Help Needed   Shopping for groceries No Help Needed   Driving No Help Needed   Climbing a flight of stairs No Help Needed   Getting to places beyond walking distances No Help Needed

## 2021-04-07 ENCOUNTER — HOSPITAL ENCOUNTER (OUTPATIENT)
Dept: ULTRASOUND IMAGING | Age: 26
Discharge: HOME | End: 2021-04-07
Attending: NURSE PRACTITIONER
Payer: MEDICAID

## 2021-04-07 DIAGNOSIS — R14.0 BLOATING: ICD-10-CM

## 2021-04-07 DIAGNOSIS — R63.5 WEIGHT GAIN, ABNORMAL: ICD-10-CM

## 2021-04-07 DIAGNOSIS — R10.13 EPIGASTRIC DISCOMFORT: ICD-10-CM

## 2021-04-07 PROCEDURE — 76705 ECHO EXAM OF ABDOMEN: CPT

## 2021-04-09 ENCOUNTER — TELEPHONE (OUTPATIENT)
Dept: INTERNAL MEDICINE CLINIC | Age: 26
End: 2021-04-09

## 2021-04-09 DIAGNOSIS — F51.01 PRIMARY INSOMNIA: Primary | ICD-10-CM

## 2021-04-09 DIAGNOSIS — M54.50 ACUTE MIDLINE LOW BACK PAIN WITHOUT SCIATICA: Primary | ICD-10-CM

## 2021-04-09 RX ORDER — CYCLOBENZAPRINE HCL 5 MG
5 TABLET ORAL
Qty: 21 TAB | Refills: 0 | Status: SHIPPED | OUTPATIENT
Start: 2021-04-09 | End: 2021-06-23

## 2021-04-09 RX ORDER — ESZOPICLONE 3 MG/1
3 TABLET, FILM COATED ORAL
Qty: 30 TAB | Refills: 1 | Status: SHIPPED | OUTPATIENT
Start: 2021-04-09 | End: 2021-06-23

## 2021-04-09 NOTE — TELEPHONE ENCOUNTER
Insurance would not cover Ramelteon unless failed both Lunesta and Ambien.  Will trial 1 month of Lunesta 3 mg before bed time

## 2021-04-09 NOTE — TELEPHONE ENCOUNTER
Ramelteon not covered under [facility], prior auth done thru Cover my meds. Medication denied. Will only approve if 2 of the following have been unsuccessful, eszopiclone, zalepiom or zolpidem IR (generic ambien). Noted pt has had unscessful attempt with zolpidem IR and trazodone.

## 2021-04-09 NOTE — TELEPHONE ENCOUNTER
----- Message from Israeli Peers sent at 4/9/2021  1:39 PM EDT -----  Regarding: Dr. Carmina Johnson  General Message/Vendor Calls    Caller's first and last name: Pt       Reason for call: Pharmacy is stating the new sleep medication needs a pre-authorization. Callback required yes/no and why: yes, to discuss       Best contact number(s): [phone]      Details to clarify the request: Pharmacy states they faxed form to office but no response yet. Also, patient states Dr. Alexandra Marroquin was going to prescribe some muscle relaxers but pharmacy has no record of such medication.        Israeli Peers

## 2021-04-12 ENCOUNTER — TELEPHONE (OUTPATIENT)
Dept: INTERNAL MEDICINE CLINIC | Age: 26
End: 2021-04-12

## 2021-04-12 NOTE — TELEPHONE ENCOUNTER
Pharmacy told her they only had one Rx but when she arrived they had 3. Pt has no further questions at this time.

## 2021-04-12 NOTE — TELEPHONE ENCOUNTER
Patient called Friday and did not receive a call back, she has questions about muscle relaxer medication. She doesn't know if it was prescribed or not. Please give patient a call back.     [facility]# [phone]

## 2021-04-15 ENCOUNTER — HOSPITAL ENCOUNTER (EMERGENCY)
Age: 26
Discharge: HOME | End: 2021-04-15
Attending: EMERGENCY MEDICINE
Payer: MEDICAID

## 2021-04-15 VITALS
OXYGEN SATURATION: 99 % | HEIGHT: 65 IN | HEART RATE: 86 BPM | SYSTOLIC BLOOD PRESSURE: 137 MMHG | BODY MASS INDEX: 48.82 KG/M2 | TEMPERATURE: 97.8 F | WEIGHT: 293 LBS | RESPIRATION RATE: 16 BRPM | DIASTOLIC BLOOD PRESSURE: 75 MMHG

## 2021-04-15 DIAGNOSIS — L24.9 IRRITANT CONTACT DERMATITIS, UNSPECIFIED TRIGGER: Primary | ICD-10-CM

## 2021-04-15 PROCEDURE — 99282 EMERGENCY DEPT VISIT SF MDM: CPT

## 2021-04-15 RX ORDER — PERMETHRIN 50 MG/G
CREAM TOPICAL
Qty: 60 G | Refills: 0 | Status: SHIPPED | OUTPATIENT
Start: 2021-04-15 | End: 2021-06-23

## 2021-04-15 RX ORDER — TRIAMCINOLONE ACETONIDE 1 MG/ML
LOTION TOPICAL
Qty: 60 ML | Refills: 0 | Status: SHIPPED | OUTPATIENT
Start: 2021-04-15 | End: 2021-06-23

## 2021-04-15 NOTE — ED NOTES
DR Giuseppe Sen reviewed discharge instructions with the patient. The patient verbalized understanding.   Alert and stable for discharge

## 2021-04-15 NOTE — DISCHARGE INSTRUCTIONS
It was a pleasure taking care of you in our Emergency Department today. We know that when you come to [facility] you are entrusting us with your health, comfort, and safety. Our physicians and nurses honor that trust, and truly appreciate the opportunity to care for you and your loved ones. We also value your feedback. If you receive a survey about your Emergency Department experience today, please fill it out. We care about our patients' feedback, and we listen to what you have to say. Thank you!

## 2021-04-20 ENCOUNTER — TELEPHONE (OUTPATIENT)
Dept: INTERNAL MEDICINE CLINIC | Age: 26
End: 2021-04-20

## 2021-04-20 NOTE — TELEPHONE ENCOUNTER
She is on max dose.  Need her to continue to try it for 10 more days to satisfy insurance so we can trial the other medications

## 2021-04-20 NOTE — TELEPHONE ENCOUNTER
Patient has questions about her current medication. Please give patient a call back.   [facility]# [phone]

## 2021-04-20 NOTE — TELEPHONE ENCOUNTER
She has been taking the lunesta for 10 days, not able to fall asleep until 2 am - 3 am. Pt wants to know if she can try taking 2 tablets at bedtime.

## 2021-04-20 NOTE — TELEPHONE ENCOUNTER
I called the patient and verified them by name and date of birth. I informed the patient on the message from the provider and the explanation of a prior authorization. Along with the quesitons they may ask. They stated understanding and had no further questions.

## 2021-04-21 NOTE — ED PROVIDER NOTES
EMERGENCY DEPARTMENT HISTORY AND PHYSICAL EXAM      Date: 4/15/2021  Patient Name: Meron Gomez  Patient Age and Sex: 22 y.o. female    History of Presenting Illness     Chief Complaint   Patient presents with    Rash     x 2 weeks, she thinks she may have gotten it from her son, mostly on her chest       History Provided By: Patient    Ability to gather history was limited by:     HPI: Twyla Steve, 22 y.o. female with no significant past medical history c/o itchy rash with small red bumps for the past 2 weeks. She is here with her son who has the same rash though his is more extensive. Patient's rash is mostly over the upper breasts/chest.  No pain or fever. Symptoms mild. Wonders if it could be mites or some allergic reaction. Thinks she might have gotten it from the son when he laid on her chest.  Has not tried any medication for it. Location:    Quality:      Severity:    Duration:   Timing:      Context:    Modifying factors:   Associated symptoms:     Past History      The patient's medical, surgical, and social history on file were reviewed by me today.      The family history was reviewed by me today and was non-contributory, unless otherwise specified below:    Past Medical History:  Past Medical History:   Diagnosis Date    Acquired hypothyroidism 3/8/2021    Hearing disorder     Hepatic steatosis 3/8/2021    Psychiatric problem     depression       Past Surgical History:  Past Surgical History:   Procedure Laterality Date    HX ORTHOPAEDIC      left foot fracture    HX OTHER SURGICAL      mouth surgery    HX OTHER SURGICAL      wisdom teeth 2013    HX OTHER SURGICAL      myrengotomy 2010       Family History:  Family History   Problem Relation Age of Onset    Stroke Mother     Hypertension Father     Cancer Maternal Aunt         ovarian, breast    Hypertension Maternal Aunt     No Known Problems Maternal Uncle     Cancer Paternal Aunt         breast    Thyroid Disease Paternal Aunt     Cancer Paternal Uncle         benign brain tumor, lung    Hypertension Paternal Uncle     Depression Paternal Uncle     Suicide Paternal Uncle        Social History:  Social History     Tobacco Use    Smoking status: Former Smoker     Quit date: 2014     Years since quittin.9    Smokeless tobacco: Current User    Tobacco comment: gum   Substance Use Topics    Alcohol use: No    Drug use: No       Current Medications:  No current facility-administered medications on file prior to encounter. Current Outpatient Medications on File Prior to Encounter   Medication Sig Dispense Refill    eszopiclone (LUNESTA) 3 mg tablet Take 1 Tab by mouth nightly. Max Daily Amount: 3 mg. 30 Tab 1    cyclobenzaprine (FLEXERIL) 5 mg tablet Take 1 Tab by mouth three (3) times daily (with meals). 21 Tab 0    levothyroxine (SYNTHROID) 25 mcg tablet Take 1 Tab by mouth Daily (before breakfast). 30 Tab 1    omeprazole (PRILOSEC) 40 mg capsule Take 1 Cap by mouth daily. 30 Cap 2       Allergies:  No Known Allergies  Review of Systems    A complete ROS was reviewed by me today and was negative, unless otherwise specified below:    Review of Systems   Constitutional: Negative for fatigue and fever. HENT: Negative for facial swelling. Respiratory: Negative for shortness of breath and wheezing. Gastrointestinal: Negative for abdominal pain. Skin: Positive for rash. All other systems reviewed and are negative. Physical Exam   Vital Signs  No data found. Physical Exam  Vitals signs and nursing note reviewed. Constitutional:       General: She is not in acute distress. Appearance: Normal appearance. She is well-developed. She is not ill-appearing. HENT:      Head: Normocephalic and atraumatic. Mouth/Throat:      Mouth: Mucous membranes are moist.   Eyes:      General:         Right eye: No discharge. Left eye: No discharge. Conjunctiva/sclera: Conjunctivae normal.   Neck:      Musculoskeletal: Normal range of motion and neck supple. Cardiovascular:      Rate and Rhythm: Normal rate and regular rhythm. Heart sounds: Normal heart sounds. No murmur. Pulmonary:      Effort: Pulmonary effort is normal. No respiratory distress. Breath sounds: Normal breath sounds. No wheezing. Abdominal:      General: There is no distension. Palpations: Abdomen is soft. Tenderness: There is no abdominal tenderness. Musculoskeletal: Normal range of motion. General: No deformity. Skin:     General: Skin is warm and dry. Findings: Rash present. Rash is papular. Comments: Mild papular rash, mild excoriations. No formal vesicles. Crosses midline. Neurological:      General: No focal deficit present. Mental Status: She is alert and oriented to person, place, and time. Psychiatric:         Speech: Speech normal.         Behavior: Behavior normal.         Cognition and Memory: Cognition normal.         Diagnostic Study Results   Labs  No results found for this or any previous visit (from the past 24 hour(s)). Radiologic Studies  No orders to display     CT Results  (Last 48 hours)    None        CXR Results  (Last 48 hours)    None          Billable Procedures   Procedures    Cardiac monitoring was not ordered for this patient. Medical Decision Making     I reviewed the patient's most recent Emergency Dept notes and diagnostic tests in formulating my MDM on today's visit. Provider Notes (Medical Decision Making):   21 yo F with 2 weeks mild papular rash on chest, son has it more extensively. Likely uncomplicated contact dermatitis of some kind, recommend trial of Kenalog cream.  No evidence of cellulitis or shingles, doubt chickenpox. No Valtrex or ABX indicated. Also a trial of Permethrin since it may have spread from son to the mother. PMD follow up.     Jerrica Singh MD  10:42 AM  4/15/2021     Consults:    Social History     Tobacco Use    Smoking status: Former Smoker     Quit date: 2014     Years since quittin.9    Smokeless tobacco: Current User    Tobacco comment: gum   Substance Use Topics    Alcohol use: No    Drug use: No       Prescriptions from today's ED visit:  Discharge Medication List as of 4/15/2021  3:03 PM      START taking these medications    Details   permethrin (ACTICIN) 5 % topical cream Apply 60 g (one tube) to whole body for 8-12 hours, then wash off. Repeat in one week., Normal, Disp-60 g, R-0      triamcinolone (KENALOG) 0.1 % lotion Apply  to affected area three (3) times daily as needed for Skin Irritation or Itching. use thin layer, Normal, Disp-60 mL, R-0         CONTINUE these medications which have NOT CHANGED    Details   eszopiclone (LUNESTA) 3 mg tablet Take 1 Tab by mouth nightly. Max Daily Amount: 3 mg., Normal, Disp-30 Tab, R-1      cyclobenzaprine (FLEXERIL) 5 mg tablet Take 1 Tab by mouth three (3) times daily (with meals). , Normal, Disp-21 Tab, R-0      levothyroxine (SYNTHROID) 25 mcg tablet Take 1 Tab by mouth Daily (before breakfast). , Normal, Disp-30 Tab, R-1      omeprazole (PRILOSEC) 40 mg capsule Take 1 Cap by mouth daily. , Normal, Disp-30 Cap, R-2              Medications Administered during ED course:  Medications - No data to display       Diagnosis and Disposition     Disposition:  Discharged    Clinical Impression:   1. Irritant contact dermatitis, unspecified trigger        Attestation:  I personally performed the services described in this documentation on this date 4/15/2021 for patient Pepper Al. Zaid Pandya MD        I was the first provider for this patient on this visit. To the best of my ability I reviewed relevant prior medical records, electrocardiograms, laboratories, and radiologic studies.   The patient's presenting problems were discussed, and the patient was in agreement with the care plan formulated and outlined with them. Linnell Hammans, MD    Please note that this dictation was completed with Dragon voice recognition software. Quite often unanticipated grammatical, syntax, homophones, and other interpretive errors are inadvertently transcribed by the computer software. Please disregard these errors and excuse any errors that have escaped final proofreading.

## 2021-04-27 ENCOUNTER — TRANSCRIBE ORDER (OUTPATIENT)
Dept: SCHEDULING | Age: 26
End: 2021-04-27

## 2021-04-27 DIAGNOSIS — R10.13 EPIGASTRIC DISCOMFORT: Primary | ICD-10-CM

## 2021-05-04 ENCOUNTER — HOSPITAL ENCOUNTER (EMERGENCY)
Age: 26
Discharge: HOME | End: 2021-05-05
Attending: EMERGENCY MEDICINE
Payer: MEDICAID

## 2021-05-04 DIAGNOSIS — R10.84 ABDOMINAL PAIN, GENERALIZED: Primary | ICD-10-CM

## 2021-05-04 DIAGNOSIS — E87.6 HYPOKALEMIA: ICD-10-CM

## 2021-05-04 DIAGNOSIS — R11.2 NON-INTRACTABLE VOMITING WITH NAUSEA, UNSPECIFIED VOMITING TYPE: ICD-10-CM

## 2021-05-04 LAB
BASOPHILS # BLD: 0 K/UL (ref 0–0.1)
BASOPHILS NFR BLD: 0 % (ref 0–1)
DIFFERENTIAL METHOD BLD: ABNORMAL
EOSINOPHIL # BLD: 0.2 K/UL (ref 0–0.4)
EOSINOPHIL NFR BLD: 2 % (ref 0–7)
ERYTHROCYTE [DISTWIDTH] IN BLOOD BY AUTOMATED COUNT: 12.4 % (ref 11.5–14.5)
HCT VFR BLD AUTO: 39.1 % (ref 35–47)
HGB BLD-MCNC: 13.3 G/DL (ref 11.5–16)
IMM GRANULOCYTES # BLD AUTO: 0.1 K/UL (ref 0–0.04)
IMM GRANULOCYTES NFR BLD AUTO: 1 % (ref 0–0.5)
LYMPHOCYTES # BLD: 2.6 K/UL (ref 0.8–3.5)
LYMPHOCYTES NFR BLD: 32 % (ref 12–49)
MCH RBC QN AUTO: 29.6 PG (ref 26–34)
MCHC RBC AUTO-ENTMCNC: 34 G/DL (ref 30–36.5)
MCV RBC AUTO: 87.1 FL (ref 80–99)
MONOCYTES # BLD: 0.8 K/UL (ref 0–1)
MONOCYTES NFR BLD: 10 % (ref 5–13)
NEUTS SEG # BLD: 4.5 K/UL (ref 1.8–8)
NEUTS SEG NFR BLD: 55 % (ref 32–75)
NRBC # BLD: 0 K/UL (ref 0–0.01)
NRBC BLD-RTO: 0 PER 100 WBC
PLATELET # BLD AUTO: 283 K/UL (ref 150–400)
PMV BLD AUTO: 9.5 FL (ref 8.9–12.9)
RBC # BLD AUTO: 4.49 M/UL (ref 3.8–5.2)
WBC # BLD AUTO: 8.2 K/UL (ref 3.6–11)

## 2021-05-04 PROCEDURE — 36415 COLL VENOUS BLD VENIPUNCTURE: CPT

## 2021-05-04 PROCEDURE — 80053 COMPREHEN METABOLIC PANEL: CPT

## 2021-05-04 PROCEDURE — 81001 URINALYSIS AUTO W/SCOPE: CPT

## 2021-05-04 PROCEDURE — 81025 URINE PREGNANCY TEST: CPT

## 2021-05-04 PROCEDURE — 83690 ASSAY OF LIPASE: CPT

## 2021-05-04 PROCEDURE — 84702 CHORIONIC GONADOTROPIN TEST: CPT

## 2021-05-04 PROCEDURE — 85025 COMPLETE CBC W/AUTO DIFF WBC: CPT

## 2021-05-04 RX ORDER — PROCHLORPERAZINE EDISYLATE 5 MG/ML
5 INJECTION INTRAMUSCULAR; INTRAVENOUS
Status: COMPLETED | OUTPATIENT
Start: 2021-05-04 | End: 2021-05-05

## 2021-05-04 RX ORDER — FENTANYL CITRATE 50 UG/ML
50 INJECTION, SOLUTION INTRAMUSCULAR; INTRAVENOUS
Status: COMPLETED | OUTPATIENT
Start: 2021-05-04 | End: 2021-05-05

## 2021-05-04 RX ORDER — DIPHENHYDRAMINE HYDROCHLORIDE 50 MG/ML
25 INJECTION, SOLUTION INTRAMUSCULAR; INTRAVENOUS
Status: COMPLETED | OUTPATIENT
Start: 2021-05-04 | End: 2021-05-05

## 2021-05-05 ENCOUNTER — APPOINTMENT (OUTPATIENT)
Dept: ULTRASOUND IMAGING | Age: 26
End: 2021-05-05
Attending: EMERGENCY MEDICINE
Payer: MEDICAID

## 2021-05-05 VITALS
TEMPERATURE: 98.6 F | OXYGEN SATURATION: 95 % | DIASTOLIC BLOOD PRESSURE: 68 MMHG | HEART RATE: 75 BPM | RESPIRATION RATE: 18 BRPM | SYSTOLIC BLOOD PRESSURE: 114 MMHG

## 2021-05-05 LAB
ALBUMIN SERPL-MCNC: 3.3 G/DL (ref 3.5–5)
ALBUMIN/GLOB SERPL: 0.8 {RATIO} (ref 1.1–2.2)
ALP SERPL-CCNC: 59 U/L (ref 45–117)
ALT SERPL-CCNC: 82 U/L (ref 12–78)
ANION GAP SERPL CALC-SCNC: 8 MMOL/L (ref 5–15)
APPEARANCE UR: ABNORMAL
AST SERPL-CCNC: 42 U/L (ref 15–37)
BACTERIA URNS QL MICRO: ABNORMAL /HPF
BILIRUB SERPL-MCNC: 0.7 MG/DL (ref 0.2–1)
BILIRUB UR QL: NEGATIVE
BUN SERPL-MCNC: 7 MG/DL (ref 6–20)
BUN/CREAT SERPL: 9 (ref 12–20)
CALCIUM SERPL-MCNC: 8.9 MG/DL (ref 8.5–10.1)
CHLORIDE SERPL-SCNC: 106 MMOL/L (ref 97–108)
CO2 SERPL-SCNC: 25 MMOL/L (ref 21–32)
COLOR UR: ABNORMAL
CREAT SERPL-MCNC: 0.74 MG/DL (ref 0.55–1.02)
EPITH CASTS URNS QL MICRO: ABNORMAL /LPF
GLOBULIN SER CALC-MCNC: 4.3 G/DL (ref 2–4)
GLUCOSE SERPL-MCNC: 107 MG/DL (ref 65–100)
GLUCOSE UR STRIP.AUTO-MCNC: NEGATIVE MG/DL
HCG SERPL-ACNC: <1 MIU/ML (ref 0–6)
HCG UR QL: POSITIVE
HGB UR QL STRIP: NEGATIVE
HYALINE CASTS URNS QL MICRO: ABNORMAL /LPF (ref 0–5)
KETONES UR QL STRIP.AUTO: NEGATIVE MG/DL
LEUKOCYTE ESTERASE UR QL STRIP.AUTO: ABNORMAL
LIPASE SERPL-CCNC: 92 U/L (ref 73–393)
NITRITE UR QL STRIP.AUTO: NEGATIVE
PH UR STRIP: 6.5 [PH] (ref 5–8)
POTASSIUM SERPL-SCNC: 3.2 MMOL/L (ref 3.5–5.1)
PROT SERPL-MCNC: 7.6 G/DL (ref 6.4–8.2)
PROT UR STRIP-MCNC: NEGATIVE MG/DL
RBC #/AREA URNS HPF: ABNORMAL /HPF (ref 0–5)
SODIUM SERPL-SCNC: 139 MMOL/L (ref 136–145)
SP GR UR REFRACTOMETRY: 1.01 (ref 1–1.03)
UA: UC IF INDICATED,UAUC: ABNORMAL
UROBILINOGEN UR QL STRIP.AUTO: 1 EU/DL (ref 0.2–1)
WBC URNS QL MICRO: ABNORMAL /HPF (ref 0–4)

## 2021-05-05 PROCEDURE — 96374 THER/PROPH/DIAG INJ IV PUSH: CPT

## 2021-05-05 PROCEDURE — 96375 TX/PRO/DX INJ NEW DRUG ADDON: CPT

## 2021-05-05 PROCEDURE — 96361 HYDRATE IV INFUSION ADD-ON: CPT

## 2021-05-05 PROCEDURE — 74011250637 HC RX REV CODE- 250/637: Performed by: EMERGENCY MEDICINE

## 2021-05-05 PROCEDURE — 76705 ECHO EXAM OF ABDOMEN: CPT

## 2021-05-05 PROCEDURE — 99285 EMERGENCY DEPT VISIT HI MDM: CPT

## 2021-05-05 PROCEDURE — 74011250636 HC RX REV CODE- 250/636: Performed by: EMERGENCY MEDICINE

## 2021-05-05 RX ORDER — ONDANSETRON 4 MG/1
4 TABLET, ORALLY DISINTEGRATING ORAL
Qty: 10 TAB | Refills: 0 | Status: SHIPPED | OUTPATIENT
Start: 2021-05-05 | End: 2021-08-05 | Stop reason: SDUPTHER

## 2021-05-05 RX ORDER — POTASSIUM CHLORIDE 750 MG/1
40 TABLET, FILM COATED, EXTENDED RELEASE ORAL
Status: COMPLETED | OUTPATIENT
Start: 2021-05-05 | End: 2021-05-05

## 2021-05-05 RX ORDER — HYOSCYAMINE SULFATE 0.12 MG/1
0.12 TABLET SUBLINGUAL
Qty: 10 TAB | Refills: 0 | Status: SHIPPED | OUTPATIENT
Start: 2021-05-05 | End: 2021-06-23

## 2021-05-05 RX ORDER — HYDROCODONE BITARTRATE AND ACETAMINOPHEN 5; 325 MG/1; MG/1
1 TABLET ORAL
Qty: 10 TAB | Refills: 0 | Status: SHIPPED | OUTPATIENT
Start: 2021-05-05 | End: 2021-05-08

## 2021-05-05 RX ADMIN — SODIUM CHLORIDE 1000 ML: 9 INJECTION, SOLUTION INTRAVENOUS at 00:00

## 2021-05-05 RX ADMIN — POTASSIUM CHLORIDE 40 MEQ: 750 TABLET, FILM COATED, EXTENDED RELEASE ORAL at 01:10

## 2021-05-05 RX ADMIN — DIPHENHYDRAMINE HYDROCHLORIDE 25 MG: 50 INJECTION, SOLUTION INTRAMUSCULAR; INTRAVENOUS at 00:01

## 2021-05-05 RX ADMIN — FENTANYL CITRATE 50 MCG: 50 INJECTION, SOLUTION INTRAMUSCULAR; INTRAVENOUS at 00:01

## 2021-05-05 RX ADMIN — PROCHLORPERAZINE EDISYLATE 5 MG: 5 INJECTION INTRAMUSCULAR; INTRAVENOUS at 00:01

## 2021-05-05 NOTE — ED NOTES
Pt was unable to give more urine for POC pregnancy test. Dr. Ema Freeman notified and gave ok to order HCG test from previous U/A sent to lab earlier in the night. Dr. Tj Isabel notified of HCG results.

## 2021-05-05 NOTE — ED NOTES
Pt's o2 was satting at 88-90%. Pt denied SOB but reported feeling tired. Pt was placed on 2l NC. Satting now in the mid 90%s.

## 2021-05-05 NOTE — ED PROVIDER NOTES
EMERGENCY DEPARTMENT HISTORY AND PHYSICAL EXAM      Date: 5/4/2021  Patient Name: Ana Rosa Gomez    History of Presenting Illness     Chief Complaint   Patient presents with    Abdominal Pain     abd pain and vomiting after eating x several days. given 4mg of zofran from EMS       History Provided By: Patient    HPI: Barron Bernal, 22 y.o. female with PMHx significant for depression, obesity presents to the ED with chief complaint of abdominal pain, nausea and vomiting. Patient reports that she has been having intermittent abdominal pain for the past few months. She is being worked up by Dr. Wendy Mijares (GI). She has had CT of her abdomen which did not show any acute abnormalities and an ultrasound of her gallbladder which showed a contracted gallbladder, but no other abnormalities. Patient reports that she has decreased appetite. She states that as soon as she eats she has nausea and abdominal pain. She reports that she has vomited 30 times in the last 24 hours which is worse than it usually is. She reports having loose stools for the last month and has had 4 loose stools in last 24 hours that are nonblack and nonbloody. She reports chills. States her temperatures are higher than usual up to 99.8 but no true fever. Prior to her initial evaluation a few months ago, she had gained 50 pounds in 3 months. She reports her GI doctor has scheduled her for a HIDA scan on May 7 (in 2 days) and an EGD on May 19. She denies any dysuria, hematuria, urinary frequency. Patient denies drug use, specifically denies marijuana use. She admits to smoking 1 pack a day and drinking alcohol occasionally. PCP: Jeison Sullivan PA-C    No current facility-administered medications on file prior to encounter.       Current Outpatient Medications on File Prior to Encounter   Medication Sig Dispense Refill    permethrin (ACTICIN) 5 % topical cream Apply 60 g (one tube) to whole body for 8-12 hours, then wash off. Repeat in one week. 60 g 0    triamcinolone (KENALOG) 0.1 % lotion Apply  to affected area three (3) times daily as needed for Skin Irritation or Itching. use thin layer 60 mL 0    eszopiclone (LUNESTA) 3 mg tablet Take 1 Tab by mouth nightly. Max Daily Amount: 3 mg. 30 Tab 1    cyclobenzaprine (FLEXERIL) 5 mg tablet Take 1 Tab by mouth three (3) times daily (with meals). 21 Tab 0    levothyroxine (SYNTHROID) 25 mcg tablet Take 1 Tab by mouth Daily (before breakfast). 30 Tab 1    omeprazole (PRILOSEC) 40 mg capsule Take 1 Cap by mouth daily. 27 Cap 2       Past History     Past Medical History:  Past Medical History:   Diagnosis Date    Acquired hypothyroidism 3/8/2021    Hearing disorder     Hepatic steatosis 3/8/2021    Psychiatric problem     depression       Past Surgical History:  Past Surgical History:   Procedure Laterality Date    HX ORTHOPAEDIC      left foot fracture    HX OTHER SURGICAL      mouth surgery    HX OTHER SURGICAL      wisdom teeth     HX OTHER SURGICAL      myrengotomy 2010       Family History:  Family History   Problem Relation Age of Onset    Stroke Mother     Hypertension Father     Cancer Maternal Aunt         ovarian, breast    Hypertension Maternal Aunt     No Known Problems Maternal Uncle     Cancer Paternal Aunt         breast    Thyroid Disease Paternal Aunt     Cancer Paternal Uncle         benign brain tumor, lung    Hypertension Paternal Uncle     Depression Paternal Uncle     Suicide Paternal Uncle        Social History:  Social History     Tobacco Use    Smoking status: Former Smoker     Quit date: 2014     Years since quittin.9    Smokeless tobacco: Current User    Tobacco comment: gum   Substance Use Topics    Alcohol use: No    Drug use: No       Allergies:  No Known Allergies      Review of Systems   Review of Systems   Constitutional: Positive for appetite change, chills and fatigue. Negative for fever. HENT: Negative. Respiratory: Negative for cough, chest tightness, shortness of breath and wheezing. Cardiovascular: Negative for chest pain and palpitations. Gastrointestinal: Positive for abdominal pain, diarrhea, nausea and vomiting. Negative for blood in stool and constipation. Genitourinary: Negative for dysuria, flank pain and hematuria. Musculoskeletal: Negative for back pain, myalgias and neck pain. Skin: Negative for rash and wound. Neurological: Negative for dizziness, syncope, light-headedness and headaches. Psychiatric/Behavioral: Negative for confusion. The patient is nervous/anxious. All other systems reviewed and are negative.         Physical Exam   General appearance -obese, well appearing, and in no distress  Eyes - pupils equal and reactive, extraocular eye movements intact  ENT - mucous membranes moist, pharynx normal without lesions  Neck - supple, no significant adenopathy; non-tender to palpation  Chest - clear to auscultation, no wheezes, rales or rhonchi; non-tender to palpation  Heart -mildly tachycardic, regular rhythm, S1 and S2 normal, no murmurs noted  Abdomen - soft, generalized abdominal tenderness, voluntary guarding throughout, nondistended, no masses or organomegaly  Musculoskeletal - no joint tenderness, deformity or swelling; normal ROM  Extremities - peripheral pulses normal, no pedal edema  Skin - normal coloration and turgor, no rashes  Neurological - alert, oriented x3, normal speech, no focal findings or movement disorder noted    Diagnostic Study Results     Labs -     Recent Results (from the past 12 hour(s))   CBC WITH AUTOMATED DIFF    Collection Time: 05/04/21 11:28 PM   Result Value Ref Range    WBC 8.2 3.6 - 11.0 K/uL    RBC 4.49 3.80 - 5.20 M/uL    HGB 13.3 11.5 - 16.0 g/dL    HCT 39.1 35.0 - 47.0 %    MCV 87.1 80.0 - 99.0 FL    MCH 29.6 26.0 - 34.0 PG    MCHC 34.0 30.0 - 36.5 g/dL    RDW 12.4 11.5 - 14.5 %    PLATELET 887 064 - 254 K/uL    MPV 9.5 8.9 - 12.9 FL NRBC 0.0 0  WBC    ABSOLUTE NRBC 0.00 0.00 - 0.01 K/uL    NEUTROPHILS 55 32 - 75 %    LYMPHOCYTES 32 12 - 49 %    MONOCYTES 10 5 - 13 %    EOSINOPHILS 2 0 - 7 %    BASOPHILS 0 0 - 1 %    IMMATURE GRANULOCYTES 1 (H) 0.0 - 0.5 %    ABS. NEUTROPHILS 4.5 1.8 - 8.0 K/UL    ABS. LYMPHOCYTES 2.6 0.8 - 3.5 K/UL    ABS. MONOCYTES 0.8 0.0 - 1.0 K/UL    ABS. EOSINOPHILS 0.2 0.0 - 0.4 K/UL    ABS. BASOPHILS 0.0 0.0 - 0.1 K/UL    ABS. IMM. GRANS. 0.1 (H) 0.00 - 0.04 K/UL    DF AUTOMATED     METABOLIC PANEL, COMPREHENSIVE    Collection Time: 05/04/21 11:28 PM   Result Value Ref Range    Sodium 139 136 - 145 mmol/L    Potassium 3.2 (L) 3.5 - 5.1 mmol/L    Chloride 106 97 - 108 mmol/L    CO2 25 21 - 32 mmol/L    Anion gap 8 5 - 15 mmol/L    Glucose 107 (H) 65 - 100 mg/dL    BUN 7 6 - 20 MG/DL    Creatinine 0.74 0.55 - 1.02 MG/DL    BUN/Creatinine ratio 9 (L) 12 - 20      GFR est AA >60 >60 ml/min/1.73m2    GFR est non-AA >60 >60 ml/min/1.73m2    Calcium 8.9 8.5 - 10.1 MG/DL    Bilirubin, total 0.7 0.2 - 1.0 MG/DL    ALT (SGPT) 82 (H) 12 - 78 U/L    AST (SGOT) 42 (H) 15 - 37 U/L    Alk.  phosphatase 59 45 - 117 U/L    Protein, total 7.6 6.4 - 8.2 g/dL    Albumin 3.3 (L) 3.5 - 5.0 g/dL    Globulin 4.3 (H) 2.0 - 4.0 g/dL    A-G Ratio 0.8 (L) 1.1 - 2.2     LIPASE    Collection Time: 05/04/21 11:28 PM   Result Value Ref Range    Lipase 92 73 - 393 U/L   BETA HCG, QT    Collection Time: 05/04/21 11:28 PM   Result Value Ref Range    Beta HCG, QT <1 0 - 6 MIU/ML   URINALYSIS W/ REFLEX CULTURE    Collection Time: 05/04/21 11:36 PM    Specimen: Urine   Result Value Ref Range    Color YELLOW/STRAW      Appearance CLOUDY (A) CLEAR      Specific gravity 1.014 1.003 - 1.030      pH (UA) 6.5 5.0 - 8.0      Protein Negative NEG mg/dL    Glucose Negative NEG mg/dL    Ketone Negative NEG mg/dL    Bilirubin Negative NEG      Blood Negative NEG      Urobilinogen 1.0 0.2 - 1.0 EU/dL    Nitrites Negative NEG      Leukocyte Esterase TRACE (A) NEG WBC 5-10 0 - 4 /hpf    RBC 0-5 0 - 5 /hpf    Epithelial cells MODERATE (A) FEW /lpf    Bacteria 1+ (A) NEG /hpf    UA:UC IF INDICATED CULTURE NOT INDICATED BY UA RESULT CNI      Hyaline cast 2-5 0 - 5 /lpf   HCG URINE, QL    Collection Time: 05/04/21 11:36 PM   Result Value Ref Range    HCG urine, QL Positive (A) NEG         Radiologic Studies -   US ABD LTD   Final Result   No acute abnormality. Diffuse fatty infiltration of the liver. CT Results  (Last 48 hours)    None        CXR Results  (Last 48 hours)    None            Medical Decision Making   I am the first provider for this patient. I reviewed the vital signs, available nursing notes, past medical history, past surgical history, family history and social history. Vital Signs-Reviewed the patient's vital signs. Patient Vitals for the past 12 hrs:   Temp Pulse Resp BP SpO2   05/05/21 0115  75 18 114/68 95 %   05/05/21 0015    109/62    05/05/21 0000    (!) 128/90 93 %   05/04/21 2345    138/86 95 %   05/04/21 2310 98.6 °F (37 °C)       05/04/21 2305 99.7 °F (37.6 °C) (!) 104 18 (!) 141/92 100 %           Records Reviewed: Nursing Notes and Old Medical Records    Provider Notes (Medical Decision Making):   Differential diagnosis: Gastroenteritis, dehydration, electrolyte abnormality, pancreatitis, cholecystitis  We will obtain CBC, CMP, lipase, UA, pregnancy test, will repeat ultrasound as gallbladder was contracted on initial ultrasound and the were not able to interpret whether there was inflammation of the gallbladder wall. Will treat with antiemetics, analgesics, and IV fluids. ED Course:   Initial assessment performed. The patients presenting problems have been discussed, and they are in agreement with the care plan formulated and outlined with them. I have encouraged them to ask questions as they arise throughout their visit.     Progress Notes:  ED Course as of May 05 0404   Wed May 05, 2021   4566 Lab reported urine pregnancy test is positive, however beta-hCG was less than 1. Given discrepancy, lab repeated both the urine pregnancy test and the serum beta quant and got the same results on the second time. Discussed with patient and told her that most likely the serum quant is accurate and she is not pregnant, but she should follow-up with her regular doctor. Will treat with Zofran, Norco, Levsin and encourage follow-up for her HIDA scan as scheduled on Friday with GI.    [AO]      ED Course User Index  [AO] Genny Cordova MD       Disposition:  Discharge home    PLAN:  1. Current Discharge Medication List        2. Follow-up Information     Follow up With Specialties Details Why Contact Info    [facility] EMERGENCY DEPT Emergency Medicine  If symptoms worsen [address] [facility] [place] [address]    Rigoberto Briceño MD Gastroenterology Call today  [address]  [address] [facility]  [phone]          Return to ED if worse     Diagnosis     Clinical Impression:   1. Abdominal pain, generalized    2. Non-intractable vomiting with nausea, unspecified vomiting type    3.  Hypokalemia

## 2021-05-05 NOTE — ED NOTES
Pt was given written and verbal discharge instructions. I.V. removed and all questions answered. Pt ambulated out of ER with  without difficulty.

## 2021-05-06 ENCOUNTER — PATIENT OUTREACH (OUTPATIENT)
Dept: CASE MANAGEMENT | Age: 26
End: 2021-05-06

## 2021-05-06 NOTE — PROGRESS NOTES
Patient contacted regarding Conwayregina Durant. Discussed COVID-19 related testing which was not done at this time. Test results were not done. Patient informed of results, if available? n/a     Ambulatory Care Manager contacted the patient by telephone to perform post discharge assessment. Call within 2 business days of discharge: Yes Verified name and  with patient as identifiers. Provided introduction to self, and explanation of the CTN/ACM role, and reason for call due to risk factors for infection and/or exposure to COVID-19. Symptoms reviewed with patient who verbalized the following symptoms: no new symptoms and no worsening symptoms      Due to no new or worsening symptoms encounter was not routed to provider for escalation. Discussed follow-up appointments. If no appointment was previously scheduled, appointment scheduling offered:  yes   [address] follow up appointment(s):   Future Appointments   Date Time Provider Caty oRmero   2021  8:00 AM [facility] NM RM 1 [address] [facility] REG   5/15/2021  8:00 AM [facility] PAT COVID ROOM 2 [facility] PAT RI OR PRE AS   2021  3:30 PM Matt Ortez PA-C [facility] BS [facility]     Non-BS follow up appointment(s): pt states still having abdominal pain, nausea and loose stools today, but tolerable. Patient states she is having further G. I. testing tomorrow with specialist.  Patient states having prescribed medications and taking as directed. Patient states she will follow up with her primary care as needed. Advance Care Planning:   Does patient have an Advance Directive:  patient declined education. Patient has following risk factors of: no known risk factors. ACM reviewed discharge instructions, medical action plan and red flags such as increased shortness of breath, increasing fever and signs of decompensation with patient who verbalized understanding.    Discussed exposure protocols and quarantine with CDC Guidelines What to do if you are sick with coronavirus disease 2019. Patient was given an opportunity for questions and concerns. The patient agrees to contact the [facility] exposure line [phone], local [facility] department [address]  [phone] and PCP office for questions related to their healthcare. ACM provided contact information for future needs. Reviewed and educated patient on any new and changed medications related to discharge diagnosis     Was patient discharged with a pulse oximeter? no Discussed and confirmed pulse oximeter discharge instructions and when to notify provider or seek emergency care. No further call at this time based on severity of symptoms and risk factors.

## 2021-05-07 ENCOUNTER — HOSPITAL ENCOUNTER (OUTPATIENT)
Dept: NUCLEAR MEDICINE | Age: 26
Discharge: HOME | End: 2021-05-07
Attending: INTERNAL MEDICINE
Payer: MEDICAID

## 2021-05-07 VITALS — WEIGHT: 293 LBS | BODY MASS INDEX: 51.75 KG/M2

## 2021-05-07 DIAGNOSIS — R10.13 EPIGASTRIC DISCOMFORT: ICD-10-CM

## 2021-05-07 PROCEDURE — 74011250636 HC RX REV CODE- 250/636: Performed by: INTERNAL MEDICINE

## 2021-05-07 PROCEDURE — 78227 HEPATOBIL SYST IMAGE W/DRUG: CPT

## 2021-05-07 RX ADMIN — SINCALIDE 2.82 MCG: 5 INJECTION, POWDER, LYOPHILIZED, FOR SOLUTION INTRAVENOUS at 08:58

## 2021-05-15 ENCOUNTER — HOSPITAL ENCOUNTER (OUTPATIENT)
Dept: PREADMISSION TESTING | Age: 26
Discharge: HOME | End: 2021-05-15
Payer: MEDICAID

## 2021-05-15 LAB — SARS-COV-2, COV2: NORMAL

## 2021-05-15 PROCEDURE — U0003 INFECTIOUS AGENT DETECTION BY NUCLEIC ACID (DNA OR RNA); SEVERE ACUTE RESPIRATORY SYNDROME CORONAVIRUS 2 (SARS-COV-2) (CORONAVIRUS DISEASE [COVID-19]), AMPLIFIED PROBE TECHNIQUE, MAKING USE OF HIGH THROUGHPUT TECHNOLOGIES AS DESCRIBED BY CMS-2020-01-R: HCPCS

## 2021-05-16 LAB — SARS-COV-2, COV2NT: NOT DETECTED

## 2021-05-19 ENCOUNTER — HOSPITAL ENCOUNTER (OUTPATIENT)
Age: 26
Setting detail: OUTPATIENT SURGERY
Discharge: HOME | End: 2021-05-19
Attending: INTERNAL MEDICINE | Admitting: INTERNAL MEDICINE
Payer: MEDICAID

## 2021-05-19 ENCOUNTER — ANESTHESIA EVENT (OUTPATIENT)
Dept: ENDOSCOPY | Age: 26
End: 2021-05-19
Payer: MEDICAID

## 2021-05-19 ENCOUNTER — ANESTHESIA (OUTPATIENT)
Dept: ENDOSCOPY | Age: 26
End: 2021-05-19
Payer: MEDICAID

## 2021-05-19 VITALS
HEART RATE: 94 BPM | HEIGHT: 65 IN | SYSTOLIC BLOOD PRESSURE: 116 MMHG | BODY MASS INDEX: 48.82 KG/M2 | TEMPERATURE: 97.8 F | DIASTOLIC BLOOD PRESSURE: 88 MMHG | OXYGEN SATURATION: 95 % | WEIGHT: 293 LBS | RESPIRATION RATE: 18 BRPM

## 2021-05-19 LAB — HCG UR QL: NEGATIVE

## 2021-05-19 PROCEDURE — 76040000019: Performed by: INTERNAL MEDICINE

## 2021-05-19 PROCEDURE — 77030019988 HC FCPS ENDOSC DISP BSC -B: Performed by: INTERNAL MEDICINE

## 2021-05-19 PROCEDURE — 74011000250 HC RX REV CODE- 250: Performed by: ANESTHESIOLOGY

## 2021-05-19 PROCEDURE — 74011250636 HC RX REV CODE- 250/636: Performed by: INTERNAL MEDICINE

## 2021-05-19 PROCEDURE — 81025 URINE PREGNANCY TEST: CPT

## 2021-05-19 PROCEDURE — 2709999900 HC NON-CHARGEABLE SUPPLY: Performed by: INTERNAL MEDICINE

## 2021-05-19 PROCEDURE — 88313 SPECIAL STAINS GROUP 2: CPT

## 2021-05-19 PROCEDURE — 76060000031 HC ANESTHESIA FIRST 0.5 HR: Performed by: INTERNAL MEDICINE

## 2021-05-19 PROCEDURE — 74011250636 HC RX REV CODE- 250/636: Performed by: ANESTHESIOLOGY

## 2021-05-19 PROCEDURE — 88305 TISSUE EXAM BY PATHOLOGIST: CPT

## 2021-05-19 RX ORDER — ATROPINE SULFATE 0.1 MG/ML
0.5 INJECTION INTRAVENOUS
Status: DISCONTINUED | OUTPATIENT
Start: 2021-05-19 | End: 2021-05-19 | Stop reason: HOSPADM

## 2021-05-19 RX ORDER — SODIUM CHLORIDE 0.9 % (FLUSH) 0.9 %
5-40 SYRINGE (ML) INJECTION EVERY 8 HOURS
Status: DISCONTINUED | OUTPATIENT
Start: 2021-05-19 | End: 2021-05-19 | Stop reason: HOSPADM

## 2021-05-19 RX ORDER — EPINEPHRINE 0.1 MG/ML
1 INJECTION INTRACARDIAC; INTRAVENOUS
Status: DISCONTINUED | OUTPATIENT
Start: 2021-05-19 | End: 2021-05-19 | Stop reason: HOSPADM

## 2021-05-19 RX ORDER — PROPOFOL 10 MG/ML
INJECTION, EMULSION INTRAVENOUS AS NEEDED
Status: DISCONTINUED | OUTPATIENT
Start: 2021-05-19 | End: 2021-05-19 | Stop reason: HOSPADM

## 2021-05-19 RX ORDER — SODIUM CHLORIDE 0.9 % (FLUSH) 0.9 %
5-40 SYRINGE (ML) INJECTION AS NEEDED
Status: DISCONTINUED | OUTPATIENT
Start: 2021-05-19 | End: 2021-05-19 | Stop reason: HOSPADM

## 2021-05-19 RX ORDER — SODIUM CHLORIDE 9 MG/ML
75 INJECTION, SOLUTION INTRAVENOUS CONTINUOUS
Status: DISCONTINUED | OUTPATIENT
Start: 2021-05-19 | End: 2021-05-19 | Stop reason: HOSPADM

## 2021-05-19 RX ORDER — FLUMAZENIL 0.1 MG/ML
0.2 INJECTION INTRAVENOUS
Status: DISCONTINUED | OUTPATIENT
Start: 2021-05-19 | End: 2021-05-19 | Stop reason: HOSPADM

## 2021-05-19 RX ORDER — NALOXONE HYDROCHLORIDE 0.4 MG/ML
0.4 INJECTION, SOLUTION INTRAMUSCULAR; INTRAVENOUS; SUBCUTANEOUS
Status: DISCONTINUED | OUTPATIENT
Start: 2021-05-19 | End: 2021-05-19 | Stop reason: HOSPADM

## 2021-05-19 RX ORDER — LIDOCAINE HYDROCHLORIDE 20 MG/ML
INJECTION, SOLUTION EPIDURAL; INFILTRATION; INTRACAUDAL; PERINEURAL AS NEEDED
Status: DISCONTINUED | OUTPATIENT
Start: 2021-05-19 | End: 2021-05-19 | Stop reason: HOSPADM

## 2021-05-19 RX ORDER — DEXTROMETHORPHAN/PSEUDOEPHED 2.5-7.5/.8
1.2 DROPS ORAL
Status: DISCONTINUED | OUTPATIENT
Start: 2021-05-19 | End: 2021-05-19 | Stop reason: HOSPADM

## 2021-05-19 RX ADMIN — LIDOCAINE HYDROCHLORIDE 100 MG: 20 INJECTION, SOLUTION EPIDURAL; INFILTRATION; INTRACAUDAL; PERINEURAL at 13:41

## 2021-05-19 RX ADMIN — SODIUM CHLORIDE 75 ML/HR: 900 INJECTION, SOLUTION INTRAVENOUS at 13:39

## 2021-05-19 RX ADMIN — PROPOFOL 300 MG: 10 INJECTION, EMULSION INTRAVENOUS at 13:51

## 2021-05-19 NOTE — PROCEDURES
NAME:  Mundo Gomez   :   1995   MRN:   [id number]     Date/Time:  2021 1:50 PM    Esophagogastroduodenoscopy (EGD) Procedure Note    Procedure: Esophagogastroduodenoscopy with biopsy    Indication:  Abdominal pain, epigastric  Pre-operative Diagnosis: see indication above  Post-operative Diagnosis: see findings below  :  Alea Busch MD  Referring Provider:   --Allen Perez PA-C    Exam:  Airway: clear, no airway problems anticipated  Heart: RRR, without gallops or rubs  Lungs: clear bilaterally without wheezes, crackles, or rhonchi  Abdomen: soft, nontender, nondistended, bowel sounds present  Mental Status: awake, alert and oriented to person, place and time     Anethesia/Sedation:  MAC anesthesia Propofol  Procedure Details   After informed consent was obtained for the procedure, with all risks and benefits of procedure explained the patient was taken to the endoscopy suite and placed in the left lateral decubitus position. Following sequential administration of sedation as per above, the [id number] gastroscope was inserted into the mouth and advanced under direct vision to third portion of the duodenum. A careful inspection was made as the gastroscope was withdrawn, including a retroflexed view of the proximal stomach; findings and interventions are described below. Findings:   1. Normal proximal and mid esophagus  2. Irregular Z-line. Biopsied  3. Mild, patchy, non-erosive antral gastropathy. Biopsied  4. Stomach otherwise normal, including retroflexion  5. Normal duodenal bulb and 2nd/3rd portion of the duodenum. Biopsied    Therapies:  1. Biopsies    Specimens: 1. Duodenal 2. Gastric 3. GE junction    EBL:  None. Complications:   None; patient tolerated the procedure well. Impression:    1. Normal proximal and mid esophagus  2. Irregular Z-line. Biopsied  3. Mild, patchy, non-erosive antral gastropathy. Biopsied  4.  Stomach otherwise normal, including retroflexion  5. Normal duodenal bulb and 2nd/3rd portion of the duodenum. Biopsied    Recommendations:  1. Follow up pathology  2.  Gastric emptying scan if biopsies are unremarkable    Discharge disposition:  Home in the company of  when able to ambulate    Yonathan Hedrick MD

## 2021-05-19 NOTE — H&P
Gastroenterology Outpatient History and Physical    Patient: Nessa Adams [place]    Physician: Lux Sanchez MD    Chief Complaint: Epigastric AP  History of Present Illness: Bloating    History:  Past Medical History:   Diagnosis Date    Acquired hypothyroidism 3/8/2021    Hearing disorder     Hepatic steatosis 3/8/2021    Psychiatric disorder     anxiety and depression since age 12    Psychiatric problem     depression      Past Surgical History:   Procedure Laterality Date    HX GYN      c/section x1    HX ORTHOPAEDIC      left foot fracture    HX OTHER SURGICAL      mouth surgery    HX OTHER SURGICAL      wisdom teeth 2013    HX OTHER SURGICAL      myrengotomy 2010      Social History     Socioeconomic History    Marital status: SINGLE     Spouse name: Not on file    Number of children: Not on file    Years of education: Not on file    Highest education level: Not on file   Tobacco Use    Smoking status: Former Smoker     Quit date: 2014     Years since quittin.0    Smokeless tobacco: Current User    Tobacco comment: gum   Vaping Use    Vaping Use: Never used   Substance and Sexual Activity    Alcohol use: No    Drug use: No    Sexual activity: Never     Partners: Male     Social Determinants of Health     Financial Resource Strain:     Difficulty of Paying Living Expenses:    Food Insecurity:     Worried About Running Out of Food in the Last Year:     Ran Out of Food in the Last Year:    Transportation Needs:     Lack of Transportation (Medical):      Lack of Transportation (Non-Medical):    Physical Activity:     Days of Exercise per Week:     Minutes of Exercise per Session:    Stress:     Feeling of Stress :    Social Connections:     Frequency of Communication with Friends and Family:     Frequency of Social Gatherings with Friends and Family:     Attends Taoism Services:     Active Member of Clubs or Organizations:     Attends Club or Organization Meetings:     Marital Status:       Family History   Problem Relation Age of Onset   Miladis Sims Stroke Mother     Hypertension Father     Cancer Maternal Aunt         ovarian, breast    Hypertension Maternal Aunt     No Known Problems Maternal Uncle     Cancer Paternal Aunt         breast    Thyroid Disease Paternal Aunt     Cancer Paternal Uncle         benign brain tumor, lung    Hypertension Paternal Uncle     Depression Paternal Uncle     Suicide Paternal Uncle       Patient Active Problem List   Diagnosis Code    Pregnant Z34.90    Hepatic steatosis K76.0    Acquired hypothyroidism E03.9    Bilateral plantar fasciitis M72.2    Gastroesophageal reflux disease without esophagitis K21.9    Overactive bladder N32.81    Primary insomnia F51.01       Allergies: No Known Allergies  Medications:   Prior to Admission medications    Medication Sig Start Date End Date Taking? Authorizing Provider   ondansetron (Zofran ODT) 4 mg disintegrating tablet Take 1 Tab by mouth every eight (8) hours as needed for Nausea. 5/5/21  Yes Genny Cordova MD   hyoscyamine SL (LEVSIN/SL) 0.125 mg SL tablet 1 Tab by SubLINGual route every four (4) hours as needed for Cramping (10). 5/5/21  Yes Genny Cordova MD   eszopiclone (LUNESTA) 3 mg tablet Take 1 Tab by mouth nightly. Max Daily Amount: 3 mg. 4/9/21  Yes Lethaniel Brain PA-C   levothyroxine (SYNTHROID) 25 mcg tablet Take 1 Tab by mouth Daily (before breakfast). 3/8/21  Yes Lethaniel Brain, PA-C   omeprazole (PRILOSEC) 40 mg capsule Take 1 Cap by mouth daily. 3/8/21  Yes Lethaniel Brain PA-C   permethrin (ACTICIN) 5 % topical cream Apply 60 g (one tube) to whole body for 8-12 hours, then wash off. Repeat in one week. 4/15/21   Jolynn Oreilly MD   triamcinolone (KENALOG) 0.1 % lotion Apply  to affected area three (3) times daily as needed for Skin Irritation or Itching.  use thin layer 4/15/21   Jolynn Oreilly MD   cyclobenzaprine (FLEXERIL) 5 mg tablet Take 1 Tab by mouth three (3) times daily (with meals). 4/9/21   Austyn Villaseñor PA-C     Physical Exam:   Vital Signs: Blood pressure 134/63, pulse 88, temperature 98.1 °F (36.7 °C), resp. rate 16, height 5' 5\" (1.651 m), weight 138.8 kg (306 lb), last menstrual period 03/19/2018, SpO2 96 %, unknown if currently breastfeeding.   General: well developed, well nourished   HEENT: unremarkable   Heart: regular rhythm no mumur    Lungs: clear   Abdominal:  benign   Neurological: unremarkable   Extremities: no edema     Findings/Diagnosis: Epigastric AP/bloating  Plan of Care/Planned Procedure: EGD with conscious/deep sedation    Signed:  Javan Gregg MD 5/19/2021

## 2021-05-19 NOTE — ANESTHESIA POSTPROCEDURE EVALUATION
Procedure(s):  ESOPHAGOGASTRODUODENOSCOPY (EGD)  ESOPHAGOGASTRODUODENAL (EGD) BIOPSY. total IV anesthesia    Anesthesia Post Evaluation        Patient location during evaluation: PACU  Note status: Adequate. Level of consciousness: responsive to verbal stimuli and sleepy but conscious  Pain management: satisfactory to patient  Airway patency: patent  Anesthetic complications: no  Cardiovascular status: acceptable  Respiratory status: acceptable  Hydration status: acceptable  Comments: +Post-Anesthesia Evaluation and Assessment    Patient: Twyla Steve MRN: [id number]  SSN: [id number]   YOB: 1995  Age: 22 y.o. Sex: female      Cardiovascular Function/Vital Signs    /88   Pulse 94   Temp 36.6 °C (97.8 °F)   Resp 18   Ht 5' 5\" (1.651 m)   Wt 138.8 kg (306 lb)   SpO2 95%   Breastfeeding Unknown   BMI 50.92 kg/m²     Patient is status post Procedure(s):  ESOPHAGOGASTRODUODENOSCOPY (EGD)  ESOPHAGOGASTRODUODENAL (EGD) BIOPSY. Nausea/Vomiting: Controlled. Postoperative hydration reviewed and adequate. Pain:  Pain Scale 1: Numeric (0 - 10) (05/19/21 1421)  Pain Intensity 1: 0 (05/19/21 1421)   Managed. Neurological Status: At baseline. Mental Status and Level of Consciousness: Arousable. Pulmonary Status:   O2 Device: None (Room air) (05/19/21 1421)   Adequate oxygenation and airway patent. Complications related to anesthesia: None    Post-anesthesia assessment completed. No concerns. Signed By: Dana Casillas DO    5/19/2021  Post anesthesia nausea and vomiting:  controlled      INITIAL Post-op Vital signs:   Vitals Value Taken Time   /88 05/19/21 1421   Temp 36.6 °C (97.8 °F) 05/19/21 1409   Pulse 85 05/19/21 1430   Resp 18 05/19/21 1430   SpO2 95 % 05/19/21 1421   Vitals shown include unvalidated device data.

## 2021-05-19 NOTE — DISCHARGE INSTRUCTIONS
Natasha Garcia [place]  [id number]  1995    EGD DISCHARGE INSTRUCTIONS  Discomfort:  Sore throat- throat lozenges or warm salt water gargle  redness at IV site- apply warm compress to area; if redness or soreness persist- contact your physician  Gaseous discomfort- walking, belching will help relieve any discomfort  You may not operate a vehicle for 12 hours  You may not engage in an occupation involving machinery or appliances for rest of today  You may not drink alcoholic beverages for at least 12 hours  Avoid making any critical decisions for at least 24 hour  DIET  You may resume your regular diet - however -  remember your colon is empty and a heavy meal will produce gas. Avoid these foods:  vegetables, fried / greasy foods, carbonated drinks    MEDICATIONS:  Per Medication Reconciliation      ACTIVITY  You may resume your normal daily activities until tomorrow AM;  Spend the remainder of the day resting -  avoid any strenuous activity. CALL M.D. ANY SIGN OF   Increasing pain, nausea, vomiting  Abdominal distension (swelling)  New increased bleeding (oral or rectal)  Fever (chills)  Pain in chest area  Bloody discharge from nose or mouth  Shortness of breath    You may not take any Advil, Aspirin, Ibuprofen, Motrin, Aleve, or Goodys for 10 days, ONLY  Tylenol as needed for pain. IMPRESSION:  Impression:    1. Normal proximal and mid esophagus  2. Irregular Z-line. Biopsied  3. Mild, patchy, non-erosive antral gastropathy. Biopsied  4. Stomach otherwise normal, including retroflexion  5. Normal duodenal bulb and 2nd/3rd portion of the duodenum. Biopsied    Recommendations:  1. Follow up pathology  2.  Gastric emptying scan if biopsies are unremarkable    Follow-up Instructions:   Call Dr. Saud Lagos for the results of procedure / biopsy in 7-10 days   Telephone # [phone]    Sarai Ferguson MD

## 2021-05-19 NOTE — ROUTINE PROCESS
Saqibgopal Lamas [place] 1995 
[id number] Situation: 
Verbal report received from: [facility] Procedure: Procedure(s): ESOPHAGOGASTRODUODENOSCOPY (EGD) ESOPHAGOGASTRODUODENAL (EGD) BIOPSY Background: 
 
Preoperative diagnosis: EPIGASTRIC DISCOMFORT, BLOATING Postoperative diagnosis: Gastritis :  Dr. Verónica Marley Assistant(s): Endoscopy Technician-1: Josias Rose RN-1: Caleb Real RN Specimens:  
ID Type Source Tests Collected by Time Destination 1 : Duodenal biopsy Preservative   Kori Wells MD 5/19/2021 1348 Pathology 2 : Gastric biopsy Preservative Gastric  Kori Wells MD 5/19/2021 1348 Pathology 3 : GE Junction biopsy Preservative   Kori Wells MD 5/19/2021 1348 Pathology H. Pylori  no Assessment: 
Intra-procedure medications Anesthesia gave intra-procedure sedation and medications, see anesthesia flow sheet yes Intravenous fluids: NS@ [employer] Vital signs stable Abdominal assessment: round and soft Recommendation: 
Discharge patient per MD order. Return to floor Family or Friend Permission to share finding with family or friend yes

## 2021-05-19 NOTE — ANESTHESIA PREPROCEDURE EVALUATION
Anesthetic History   No history of anesthetic complications            Review of Systems / Medical History  Patient summary reviewed, nursing notes reviewed and pertinent labs reviewed    Pulmonary  Within defined limits                 Neuro/Psych         Psychiatric history    Comments: Depression  Anxiety  Primary insomnia Cardiovascular  Within defined limits                Exercise tolerance: >4 METS  Comments: ECG (2/19/13):   Normal sinus rhythm   Normal ECG   No previous ECGs available   GI/Hepatic/Renal     GERD      Liver disease    Comments: Epigastric discomfort  Bloating  Hepatic Steatosis    Overactive bladder Endo/Other      Hypothyroidism: well controlled  Morbid obesity     Other Findings              Physical Exam    Airway  Mallampati: III  TM Distance: > 6 cm  Neck ROM: normal range of motion   Mouth opening: Normal     Cardiovascular  Regular rate and rhythm,  S1 and S2 normal,  no murmur, click, rub, or gallop             Dental  No notable dental hx       Pulmonary  Breath sounds clear to auscultation               Abdominal  GI exam deferred       Other Findings            Anesthetic Plan    ASA: 3  Anesthesia type: MAC and total IV anesthesia          Induction: Intravenous  Anesthetic plan and risks discussed with: Patient

## 2021-05-24 ENCOUNTER — TELEPHONE (OUTPATIENT)
Dept: INTERNAL MEDICINE CLINIC | Age: 26
End: 2021-05-24

## 2021-05-24 NOTE — TELEPHONE ENCOUNTER
----- Message from Thomas Moreno sent at 5/24/2021  2:46 PM EDT -----  Regarding: /Telephone  General Message/Vendor Calls    Caller's first and last name: Pt       Reason for call: She is requesting a call from Dr. Emil Shelley to discuss a medication change       Callback required yes/no and why: yes       Best contact number(s): [phone]       Details to clarify the request: N/A       [facility]

## 2021-05-25 NOTE — TELEPHONE ENCOUNTER
Pt has not slept in 3 nights, has tried zolpidem, lunesta and trazadone. Per Bryn AVENDAÑO, prior auth started for ramelteon (ROZEREM) 8 mg tablet thru [employer].

## 2021-06-08 ENCOUNTER — TRANSCRIBE ORDER (OUTPATIENT)
Dept: SCHEDULING | Age: 26
End: 2021-06-08

## 2021-06-08 DIAGNOSIS — R10.13 EPIGASTRIC DISCOMFORT: Primary | ICD-10-CM

## 2021-06-23 ENCOUNTER — TELEPHONE (OUTPATIENT)
Dept: INTERNAL MEDICINE CLINIC | Age: 26
End: 2021-06-23

## 2021-06-23 ENCOUNTER — OFFICE VISIT (OUTPATIENT)
Dept: INTERNAL MEDICINE CLINIC | Age: 26
End: 2021-06-23
Payer: MEDICAID

## 2021-06-23 VITALS
SYSTOLIC BLOOD PRESSURE: 91 MMHG | OXYGEN SATURATION: 96 % | TEMPERATURE: 99.3 F | BODY MASS INDEX: 48.82 KG/M2 | HEIGHT: 65 IN | RESPIRATION RATE: 12 BRPM | DIASTOLIC BLOOD PRESSURE: 56 MMHG | HEART RATE: 87 BPM | WEIGHT: 293 LBS

## 2021-06-23 DIAGNOSIS — E03.9 ACQUIRED HYPOTHYROIDISM: ICD-10-CM

## 2021-06-23 DIAGNOSIS — E66.01 MORBID OBESITY (HCC): ICD-10-CM

## 2021-06-23 DIAGNOSIS — K21.00 GASTROESOPHAGEAL REFLUX DISEASE WITH ESOPHAGITIS WITHOUT HEMORRHAGE: ICD-10-CM

## 2021-06-23 DIAGNOSIS — R07.9 CHEST PAIN, UNSPECIFIED TYPE: Primary | ICD-10-CM

## 2021-06-23 DIAGNOSIS — F51.01 PRIMARY INSOMNIA: ICD-10-CM

## 2021-06-23 PROCEDURE — 93000 ELECTROCARDIOGRAM COMPLETE: CPT | Performed by: PHYSICIAN ASSISTANT

## 2021-06-23 PROCEDURE — 99213 OFFICE O/P EST LOW 20 MIN: CPT | Performed by: PHYSICIAN ASSISTANT

## 2021-06-23 RX ORDER — PANTOPRAZOLE SODIUM 40 MG/1
40 TABLET, DELAYED RELEASE ORAL DAILY
COMMUNITY
Start: 2021-05-23 | End: 2021-06-28 | Stop reason: ALTCHOICE

## 2021-06-23 RX ORDER — TEMAZEPAM 7.5 MG/1
CAPSULE ORAL
Qty: 60 CAPSULE | Refills: 2 | Status: SHIPPED | OUTPATIENT
Start: 2021-06-23 | End: 2021-06-24 | Stop reason: DRUGHIGH

## 2021-06-23 RX ORDER — PEN NEEDLE, DIABETIC 31 GX3/16"
NEEDLE, DISPOSABLE MISCELLANEOUS
Qty: 90 PEN NEEDLE | Refills: 3 | Status: SHIPPED | OUTPATIENT
Start: 2021-06-23 | End: 2022-09-08

## 2021-06-23 RX ORDER — LEVOTHYROXINE SODIUM 25 UG/1
25 TABLET ORAL
Qty: 90 TABLET | Refills: 3 | Status: SHIPPED | OUTPATIENT
Start: 2021-06-23 | End: 2022-06-29

## 2021-06-23 RX ORDER — RAMELTEON 8 MG/1
8 TABLET ORAL
COMMUNITY
Start: 2021-05-26 | End: 2021-06-23

## 2021-06-23 RX ORDER — DICYCLOMINE HYDROCHLORIDE 20 MG/1
20 TABLET ORAL
COMMUNITY
Start: 2021-04-07

## 2021-06-23 NOTE — PROGRESS NOTES
Jennifer Gamble is a 22y.o. year old female seen in clinic today for   Chief Complaint   Patient presents with    Results       she is here today to follow up for hypothyroid, insomnia, obesity. She has tried ramelteon, Sarahi without any improvement in sleep. She has seen sleep medicine who diagnosed her with sleep apnea but she has not been able to get her CPAP yet. She notes she does not go to bed until she is tired which often ends up being 2-4 AM. She reports she goes significant times without sleeping. She states once she is asleep she sleeps fine until one of her children wakes her. She notes she has seen GI and had EGD. She is planning to have gastric emptying study. She had multiple areas of inflammation on esophagus and stomach. She also recently had ED visit involving diffuse vomiting episodes. She has since had intermittent episodes of chest pains. They last a few seconds before resolving but do stop her in her tracks with pressure. She notes it does not radiate and she has no other symptoms including SOB, dizziness, HA, arm pain or neck pain. She reports she has been cutting calorie intake to 1200 cals a day. Has been seeing dietician. Plans to have gastric bypass next year. Runs daily. Is interested in weight loss help. Current Outpatient Medications on File Prior to Visit   Medication Sig Dispense Refill    pantoprazole (PROTONIX) 40 mg tablet Take 40 mg by mouth daily.  dicyclomine (BENTYL) 20 mg tablet Take 20 mg by mouth every six (6) hours as needed.  ondansetron (Zofran ODT) 4 mg disintegrating tablet Take 1 Tab by mouth every eight (8) hours as needed for Nausea. 10 Tab 0    omeprazole (PRILOSEC) 40 mg capsule Take 1 Cap by mouth daily. 30 Cap 2    [DISCONTINUED] ramelteon (ROZEREM) 8 mg tablet Take 8 mg by mouth nightly.       [DISCONTINUED] hyoscyamine SL (LEVSIN/SL) 0.125 mg SL tablet 1 Tab by SubLINGual route every four (4) hours as needed for Cramping (10). 10 Tab 0    [DISCONTINUED] permethrin (ACTICIN) 5 % topical cream Apply 60 g (one tube) to whole body for 8-12 hours, then wash off. Repeat in one week. 60 g 0    [DISCONTINUED] triamcinolone (KENALOG) 0.1 % lotion Apply  to affected area three (3) times daily as needed for Skin Irritation or Itching. use thin layer 60 mL 0    [DISCONTINUED] eszopiclone (LUNESTA) 3 mg tablet Take 1 Tab by mouth nightly. Max Daily Amount: 3 mg. 30 Tab 1    [DISCONTINUED] cyclobenzaprine (FLEXERIL) 5 mg tablet Take 1 Tab by mouth three (3) times daily (with meals). 21 Tab 0    [DISCONTINUED] levothyroxine (SYNTHROID) 25 mcg tablet Take 1 Tab by mouth Daily (before breakfast). 30 Tab 1     No current facility-administered medications on file prior to visit.          No Known Allergies  Past Medical History:   Diagnosis Date    Acquired hypothyroidism 3/8/2021    Hearing disorder     Hepatic steatosis 3/8/2021    Psychiatric disorder     anxiety and depression since age 12    Psychiatric problem     depression      Past Surgical History:   Procedure Laterality Date    HX GYN      c/section x1    HX ORTHOPAEDIC      left foot fracture    HX OTHER SURGICAL      mouth surgery    HX OTHER SURGICAL      wisdom teeth 2013    HX OTHER SURGICAL      myrengotomy 2010        Family History   Problem Relation Age of Onset    Stroke Mother     Hypertension Father     Cancer Maternal Aunt         ovarian, breast    Hypertension Maternal Aunt     No Known Problems Maternal Uncle     Cancer Paternal Aunt         breast    Thyroid Disease Paternal Aunt     Cancer Paternal Uncle         benign brain tumor, lung    Hypertension Paternal Uncle     Depression Paternal Uncle     Suicide Paternal Uncle         Social History     Socioeconomic History    Marital status: SINGLE     Spouse name: Not on file    Number of children: Not on file    Years of education: Not on file    Highest education level: Not on file   Occupational History    Not on file   Tobacco Use    Smoking status: Former Smoker     Quit date: 2014     Years since quittin.1    Smokeless tobacco: Former User    Tobacco comment: gum   Vaping Use    Vaping Use: Never used   Substance and Sexual Activity    Alcohol use: No    Drug use: No    Sexual activity: Never     Partners: Male   Other Topics Concern    Not on file   Social History Narrative    Not on file     Social Determinants of Health     Financial Resource Strain:     Difficulty of Paying Living Expenses:    Food Insecurity:     Worried About Running Out of Food in the Last Year:     Ran Out of Food in the Last Year:    Transportation Needs:     Lack of Transportation (Medical):  Lack of Transportation (Non-Medical):    Physical Activity:     Days of Exercise per Week:     Minutes of Exercise per Session:    Stress:     Feeling of Stress :    Social Connections:     Frequency of Communication with Friends and Family:     Frequency of Social Gatherings with Friends and Family:     Attends Mormonism Services:     Active Member of Clubs or Organizations:     Attends Club or Organization Meetings:     Marital Status:    Intimate Partner Violence:     Fear of Current or Ex-Partner:     Emotionally Abused:     Physically Abused:     Sexually Abused:            Visit Vitals  BP (!) 91/56 (BP 1 Location: Left upper arm, BP Patient Position: Sitting)   Pulse 87   Temp 99.3 °F (37.4 °C) (Oral)   Resp 12   Ht 5' 5\" (1.651 m)   Wt 313 lb (142 kg)   SpO2 96%   BMI 52.09 kg/m²       Review of Systems   Constitutional: Negative for chills, fever, malaise/fatigue and weight loss. HENT: Negative for ear pain, hearing loss and sore throat. Respiratory: Negative for cough and shortness of breath. Cardiovascular: Positive for chest pain. Negative for leg swelling. Gastrointestinal: Positive for heartburn.  Negative for abdominal pain, blood in stool, constipation, diarrhea, melena, nausea and vomiting. Genitourinary: Negative for dysuria and hematuria. Musculoskeletal: Negative for back pain and myalgias. Skin: Negative for rash. Neurological: Positive for headaches. Negative for weakness. Psychiatric/Behavioral: Negative for depression. Physical Exam  Vitals and nursing note reviewed. Constitutional:       Appearance: Normal appearance. She is obese. HENT:      Head: Normocephalic and atraumatic. Right Ear: External ear normal.      Left Ear: External ear normal.      Nose: Nose normal.      Mouth/Throat:      Mouth: Mucous membranes are moist.      Pharynx: Oropharynx is clear. Eyes:      Conjunctiva/sclera: Conjunctivae normal.      Pupils: Pupils are equal, round, and reactive to light. Cardiovascular:      Rate and Rhythm: Normal rate and regular rhythm. Pulses: Normal pulses. Heart sounds: Normal heart sounds. Pulmonary:      Effort: Pulmonary effort is normal.      Breath sounds: Normal breath sounds. No wheezing, rhonchi or rales. Abdominal:      General: Abdomen is flat. Bowel sounds are normal.      Tenderness: There is no guarding. Hernia: No hernia is present. Musculoskeletal:         General: Normal range of motion. Cervical back: Normal range of motion and neck supple. No rigidity. Skin:     General: Skin is warm and dry. Capillary Refill: Capillary refill takes less than 2 seconds. Neurological:      General: No focal deficit present. Mental Status: She is alert and oriented to person, place, and time. Gait: Gait normal.   Psychiatric:         Mood and Affect: Mood normal.         Behavior: Behavior normal.         Thought Content: Thought content normal.      EKG: normal EKG, normal sinus rhythm. No results found for this or any previous visit (from the past 24 hour(s)). ASSESSMENT AND PLAN  Diagnoses and all orders for this visit:    1.  Chest pain, unspecified type  -     AMB POC EKG ROUTINE W/ 12 LEADS, INTER & REP  Given ED precautions. Likely is related to GERD. EKG normal today. Will send for stress test if sx's persist. Advised to try pepcid during chest pains. 2. Primary insomnia  -     temazepam (RESTORIL) 7.5 mg capsule; Take 1 to 2 capsules at night for insomnia as needed  Trial 30 days of restoril  3. Gastroesophageal reflux disease with esophagitis without hemorrhage  Continue protonix  4. Morbid obesity ([address]  -     liraglutide, weight loss, (SAXENDA) 3 mg/0.5 mL (18 mg/3 mL) pen; Inject 0.6 mg daily for first week. Increase dose by 0.6 mg every week as tolerated until you reach 3 mg injection per day  Trial saxenda  5. Acquired hypothyroidism  -     levothyroxine (SYNTHROID) 25 mcg tablet; Take 1 Tablet by mouth Daily (before breakfast). Continue          I have discussed the diagnosis with the patient and the intended plan as seen in the above orders. Patient is in agreement. The patient has received an after-visit summary and questions were answered concerning future plans. I have discussed medication side effects and warnings with the patient as well.     Lucian Xavier PA-C

## 2021-06-23 NOTE — PATIENT INSTRUCTIONS
Temazepam (By mouth)   Temazepam ([id number]-meg)  Treats insomnia (trouble sleeping). Brand Name(s): Restoril   There may be other brand names for this medicine. When This Medicine Should Not Be Used: This medicine is not right for everyone. Do not use it if you had an allergic reaction to temazepam or you are pregnant. How to Use This Medicine:   Capsule, Tablet  · Your doctor will tell you how much medicine to use. Do not use more than directed. · Take this medicine just before bedtime, or when you are having trouble falling asleep. You should not take this medicine if you do not have 7 to 8 hours to sleep or rest before you need to be active again. · Call your doctor if you still have trouble sleeping after you take this medicine for 7 to 10 days. This medicine is not for long-term use. · This medicine should come with a Medication Guide. Ask your pharmacist for a copy if you do not have one. · Use this medicine only when you cannot sleep. You do not need to take it on a schedule. · Store the medicine in a closed container at room temperature, away from heat, moisture, and direct light. Drugs and Foods to Avoid:   Ask your doctor or pharmacist before using any other medicine, including over-the-counter medicines, vitamins, and herbal products. · Some medicines can affect how temazepam works. Tell your doctor if you are using diphenhydramine. · Tell your doctor if you use anything else that makes you sleepy. Some examples are allergy medicine, narcotic pain medicine, and alcohol. · Do not drink alcohol while you are using this medicine. Warnings While Using This Medicine:   · It is not safe to take this medicine during pregnancy. It could harm an unborn baby. Tell your doctor right away if you become pregnant.   · Tell your doctor if you are breastfeeding, or if you have kidney disease, liver disease, breathing problems or lung disease, or a history of alcohol or drug abuse, depression, or mental health problems. · This medicine may cause the following problems:  ¨ Respiratory depression (serious breathing problem that can be life-threatening), when used with narcotic pain medicines  · This medicine may make you dizzy or drowsy or may cause sleep-related behaviors such as sleep-driving, making phone calls, or preparing and eating food while asleep or not fully awake. Do not drive or do anything that could be dangerous until you know how this medicine affects you. · This medicine can increase thoughts of suicide. Tell your doctor right away if you start to feel depressed and have thoughts about hurting yourself. · This medicine can be habit-forming. Do not use more than your prescribed dose. Call your doctor if you think your medicine is not working. · Do not stop using this medicine suddenly. Your doctor will need to slowly decrease your dose before you stop it completely. · Call your doctor if your symptoms do not improve or if they get worse. · Keep all medicine out of the reach of children. Never share your medicine with anyone. Possible Side Effects While Using This Medicine:   Call your doctor right away if you notice any of these side effects:  · Allergic reaction: Itching or hives, swelling in your face or hands, swelling or tingling in your mouth or throat, chest tightness, trouble breathing  · Blue lips, fingernails, or skin  · Confusion or unusual excitement, nervousness, or irritability  · Depression, anxiety, irritability, hallucinations, trouble sleeping, unusual behavior, thoughts of hurting yourself or others  · Extreme drowsiness or weakness, slow heartbeat, trouble breathing, seizure, and cold, clammy skin  If you notice these less serious side effects, talk with your doctor:   · Clumsiness, trouble with coordination  · Drowsiness, dizziness, or sleepiness  If you notice other side effects that you think are caused by this medicine, tell your doctor.    Call your doctor for medical advice about side effects. You may report side effects to FDA at 6-941-LQB-0558  © 2017 [facility] Information is for End User's use only and may not be sold, redistributed or otherwise used for commercial purposes. The above information is an  only. It is not intended as medical advice for individual conditions or treatments. Talk to your doctor, nurse or pharmacist before following any medical regimen to see if it is safe and effective for you. Liraglutide (By injection)   Liraglutide (ksa-y-VDMZ-tide)  Treats type 2 diabetes and helps with weight loss in certain patients. Also reduces the risk of heart attacks and strokes in patients with type 2 diabetes and heart or blood vessel disease. Brand Name(s): Saxenda, Victoza   There may be other brand names for this medicine. When This Medicine Should Not Be Used: This medicine is not right for everyone. Do not use it if you had an allergic reaction to liraglutide, or if you have multiple endocrine neoplasia syndrome type 2 (MEN 2) or if you or anyone in your family had medullary thyroid cancer. Tell your doctor if you are pregnant or have become pregnant while you are using this medicine. How to Use This Medicine:   Injectable  · Your doctor will prescribe your exact dose and tell you how often it should be given. This medicine is given as a shot under the skin of your stomach, thighs, or upper arms. · If you use insulin in addition to this medicine, do not mix them into the same syringe. You may give the shots in the same area (including your stomach), but do not give the shots right next to each other. · You may be taught how to give your medicine at home. Make sure you understand all instructions before giving yourself an injection. Do not use more medicine or use it more often than your doctor tells you to. · Check the liquid in the pen. It should be clear and colorless.  Do not use it if it is cloudy, discolored, or has particles in it. · You will be shown the body areas where this shot can be given. Use a different body area each time you give yourself a shot. Keep track of where you give each shot to make sure you rotate body areas. · Use a new needle and syringe each time you inject your medicine. · Never share medicine pens with others under any circumstances. Sharing needles or pens can result in transmission of infection. · Drink extra fluids so you will urinate more often and help prevent kidney problems. · This medicine should come with a Medication Guide. Ask your pharmacist for a copy if you do not have one. · Missed dose: If you miss a dose of this medicine, use it as soon as you remember. Then take your next dose at your usual time. Never take extra medicine to make up for a missed dose. If you miss a dose for 3 days or more, call your doctor to talk about how to restart your treatment. · Store your new, unused medicine pen in its original carton in the refrigerator. Protect it from light. Do not freeze this medicine or use it if it has been frozen. You may store the opened medicine pen in the refrigerator or at room temperature for 30 days. Throw away your used pen after 30 days, even if it still has medicine in it. Always remove the needle from the pen before you store it. · Throw away used needles in a hard, closed container that the needles cannot poke through. Keep this container away from children and pets. Drugs and Foods to Avoid:      Ask your doctor or pharmacist before using any other medicine, including over-the-counter medicines, vitamins, and herbal products. Warnings While Using This Medicine:   · Tell your doctor if you are breastfeeding, or if you have kidney disease, liver disease, digestion problems (including gastroparesis), gallbladder disease, or a history of pancreas problems, depression, or angioedema (swelling of the arms, face, hands, mouth, or throat).   · Do not use Saxenda® if you are also using Victoza®. They contain the same medicine. · This medicine may cause the following problems:   ¨ Increased risk for thyroid tumors  ¨ Pancreatitis  ¨ Low blood sugar  ¨ Kidney problems  ¨ Gallbladder problems, including gallstones  ¨ Thoughts of hurting yourself Fuad Gill)  · Your doctor will do lab tests at regular visits to check on the effects of this medicine. Keep all appointments. · Keep all medicine out of the reach of children. Never share your medicine with anyone. Possible Side Effects While Using This Medicine:   Call your doctor right away if you notice any of these side effects:  · Allergic reaction: Itching or hives, swelling in your face or hands, swelling or tingling in your mouth or throat, chest tightness, trouble breathing  · Change in how much or how often you urinate, painful or burning urination  · Feeling sad or depressed, thoughts of suicide, unusual changes in mood or behavior  · Shaking, trembling, sweating, fast or pounding heartbeat, fainting, hunger, confusion  · Sudden and severe stomach pain, nausea, vomiting, fever, lightheadedness  · Trouble breathing or swallowing, a lump in your neck, hoarseness when speaking  · Yellow skin or eyes  If you notice these less serious side effects, talk with your doctor:   · Decreased appetite  · Diarrhea, constipation, stomach upset  · Dizziness  · Headache  · Redness, itching, swelling, or any changes in your skin where the shot was given  If you notice other side effects that you think are caused by this medicine, tell your doctor. Call your doctor for medical advice about side effects. You may report side effects to FDA at 0-150-HZI-9466  © 2017 [facility] Information is for End User's use only and may not be sold, redistributed or otherwise used for commercial purposes. The above information is an  only. It is not intended as medical advice for individual conditions or treatments.  Talk to your doctor, nurse or pharmacist before following any medical regimen to see if it is safe and effective for you.

## 2021-06-23 NOTE — PROGRESS NOTES
Summer Jewel Cecilia  Identified pt with two pt identifiers(name and ). Chief Complaint   Patient presents with    Results       Reviewed record In preparation for visit and have obtained necessary documentation. 1. Have you been to the ER, urgent care clinic or hospitalized since your last visit? No     2. Have you seen or consulted any other health care providers outside of the [address] [place] [address] since your last visit? Include any pap smears or colon screening. No    Vitals reviewed with provider.     Health Maintenance reviewed:     Health Maintenance Due   Topic    Hepatitis C Screening     COVID-19 Vaccine (1)    HPV Age 9Y-34Y (2 - 2-dose series)    PAP AKA CERVICAL CYTOLOGY           Wt Readings from Last 3 Encounters:   21 313 lb (142 kg)   21 306 lb (138.8 kg)   21 311 lb (141.1 kg)        Temp Readings from Last 3 Encounters:   21 99.3 °F (37.4 °C) (Oral)   21 97.8 °F (36.6 °C)   21 98.6 °F (37 °C)        BP Readings from Last 3 Encounters:   21 (!) 91/56   21 116/88   21 114/68        Pulse Readings from Last 3 Encounters:   21 87   21 94   21 75        Vitals:    21 1135   BP: (!) 91/56   Pulse: 87   Resp: 12   Temp: 99.3 °F (37.4 °C)   TempSrc: Oral   SpO2: 96%   Weight: 313 lb (142 kg)   Height: 5' 5\" (1.651 m)   PainSc:   6   PainLoc: Generalized          Learning Assessment:   :       Learning Assessment 3/8/2021   PRIMARY LEARNER Patient   HIGHEST LEVEL OF EDUCATION - PRIMARY LEARNER  SOME COLLEGE   BARRIERS PRIMARY LEARNER NONE   CO-LEARNER CAREGIVER No   PRIMARY LANGUAGE ENGLISH   LEARNER PREFERENCE PRIMARY DEMONSTRATION   ANSWERED BY patient   RELATIONSHIP SELF        Depression Screening:   :       3 most recent PHQ Screens 2021   PHQ Not Done Active Diagnosis of Depression or Bipolar Disorder   Little interest or pleasure in doing things -   Feeling down, depressed, irritable, or hopeless -   Total Score PHQ 2 -        Fall Risk Assessment:   :     No flowsheet data found. Abuse Screening:   :     No flowsheet data found.      ADL Screening:   :       ADL Assessment 3/8/2021   Feeding yourself No Help Needed   Getting from bed to chair No Help Needed   Getting dressed No Help Needed   Bathing or showering No Help Needed   Walk across the room (includes cane/walker) No Help Needed   Using the telphone No Help Needed   Taking your medications No Help Needed   Preparing meals No Help Needed   Managing money (expenses/bills) No Help Needed   Moderately strenuous housework (laundry) No Help Needed   Shopping for personal items (toiletries/medicines) No Help Needed   Shopping for groceries No Help Needed   Driving No Help Needed   Climbing a flight of stairs No Help Needed   Getting to places beyond walking distances No Help Needed

## 2021-06-23 NOTE — TELEPHONE ENCOUNTER
Verified patients name and date of birth. Patient advised prior authorization form submitted via Cover My Meds for Temazepam 7.5 mg capsules. Patient states she needs pen needles for liraglutide pen sent to [facility].

## 2021-06-23 NOTE — TELEPHONE ENCOUNTER
Prior authorization form submitted via Cover My Meds for Temazepam 7.5 mg capsules. Felicity Mcintyre

## 2021-06-23 NOTE — TELEPHONE ENCOUNTER
----- Message from Noam Watters sent at 6/23/2021  3:03 PM EDT -----  Regarding: /Telephone  General Message/Vendor Calls    Caller's first and last name: Pt       Reason for call: Pt following up on pharmacy sending over a prio auth for her prescription, she also states she needs a script for her needles as well or the pharmacy will make her pay out of pocket for those.        Callback required yes/no and why: yes       Best contact number(s): [phone]       Details to clarify the request: N/A       NoamAcquiashanon

## 2021-06-24 ENCOUNTER — TELEPHONE (OUTPATIENT)
Dept: INTERNAL MEDICINE CLINIC | Age: 26
End: 2021-06-24

## 2021-06-24 DIAGNOSIS — F51.01 PRIMARY INSOMNIA: Primary | ICD-10-CM

## 2021-06-24 RX ORDER — TEMAZEPAM 15 MG/1
15 CAPSULE ORAL
Qty: 30 CAPSULE | Refills: 2 | Status: SHIPPED | OUTPATIENT
Start: 2021-06-24 | End: 2021-08-05 | Stop reason: ALTCHOICE

## 2021-06-24 NOTE — TELEPHONE ENCOUNTER
Verified patients name and date of birth. Patient called and stated that the reason her insurance company will not cover rx for temazepam is because it needs to be for 15 or 30 mg. Patient is requesting rx for temazepam 15 mg rx to be sent to [facility]. No documentation from the insurance company regarding denial of coverage has been received that undersigned is aware of at this time.

## 2021-06-24 NOTE — TELEPHONE ENCOUNTER
Ok, I will send in new Rx for 15 mg. Can you please call her pharmacy and cancel previous Rx so that only one is dispensed. If previous has already been dispensed please make me aware.  Thank you

## 2021-06-24 NOTE — TELEPHONE ENCOUNTER
Verified patients name and date of birth. Spoke to pharmacist at [facility]. Asked them to cancel rx for temazepam 7.5 mg-pharmacist stated that prescription has been closed.

## 2021-06-28 ENCOUNTER — TELEPHONE (OUTPATIENT)
Dept: INTERNAL MEDICINE CLINIC | Age: 26
End: 2021-06-28

## 2021-06-28 DIAGNOSIS — K21.9 GASTROESOPHAGEAL REFLUX DISEASE WITHOUT ESOPHAGITIS: ICD-10-CM

## 2021-06-28 RX ORDER — OMEPRAZOLE 40 MG/1
CAPSULE, DELAYED RELEASE ORAL
Qty: 90 CAPSULE | Refills: 3 | Status: SHIPPED | OUTPATIENT
Start: 2021-06-28 | End: 2022-08-13

## 2021-06-28 NOTE — TELEPHONE ENCOUNTER
Pt called and stated that her saxenda needs a PA and that her insurance company told her that if the nurse puts emergent at the top then they will process it a lot faster.

## 2021-06-29 NOTE — TELEPHONE ENCOUNTER
Returned call to patient. Had her on phone and call was dropped. Returned call again and left message.

## 2021-06-29 NOTE — TELEPHONE ENCOUNTER
Verified patients name and date of birth. Advised patient regarding prior authorization for Saxenda. Informed her that a PA was completed yesterday but [employer] informed our office it was on the \"wrong form\". Patients pharmacy contacted and obtained more rx coverage info and completed new prior authorization. Patient stated understanding.

## 2021-06-30 NOTE — TELEPHONE ENCOUNTER
Verified patients name and date of birth. Spoke to patient. She advised that OV note with height/weight/attepmts at weight loss/exercise be submitted with prior authorization form for Saxenda.

## 2021-07-01 ENCOUNTER — HOSPITAL ENCOUNTER (OUTPATIENT)
Dept: NUCLEAR MEDICINE | Age: 26
Discharge: HOME | End: 2021-07-01
Attending: INTERNAL MEDICINE
Payer: MEDICAID

## 2021-07-01 DIAGNOSIS — R10.13 EPIGASTRIC DISCOMFORT: ICD-10-CM

## 2021-07-01 PROCEDURE — 78264 GASTRIC EMPTYING IMG STUDY: CPT

## 2021-07-01 RX ORDER — TECHNETIUM TC 99M SULFUR COLLOID 2 MG
0.5 KIT MISCELLANEOUS
Status: COMPLETED | OUTPATIENT
Start: 2021-07-01 | End: 2021-07-01

## 2021-07-01 RX ADMIN — TECHNETIUM TC 99M SULFUR COLLOID 0.5 MILLICURIE: KIT at 09:55

## 2021-07-01 NOTE — TELEPHONE ENCOUNTER
Prior auth form and last office note faxed to [facility] at [phone], confirmation received.

## 2021-07-23 ENCOUNTER — HOSPITAL ENCOUNTER (EMERGENCY)
Age: 26
Discharge: HOME | End: 2021-07-23
Attending: EMERGENCY MEDICINE
Payer: MEDICAID

## 2021-07-23 VITALS
OXYGEN SATURATION: 100 % | TEMPERATURE: 97.9 F | RESPIRATION RATE: 20 BRPM | BODY MASS INDEX: 48.82 KG/M2 | HEART RATE: 80 BPM | HEIGHT: 65 IN | SYSTOLIC BLOOD PRESSURE: 136 MMHG | WEIGHT: 293 LBS | DIASTOLIC BLOOD PRESSURE: 53 MMHG

## 2021-07-23 DIAGNOSIS — R11.2 NAUSEA AND VOMITING, INTRACTABILITY OF VOMITING NOT SPECIFIED, UNSPECIFIED VOMITING TYPE: Primary | ICD-10-CM

## 2021-07-23 LAB
ALBUMIN SERPL-MCNC: 3.4 G/DL (ref 3.5–5)
ALBUMIN/GLOB SERPL: 0.8 {RATIO} (ref 1.1–2.2)
ALP SERPL-CCNC: 72 U/L (ref 45–117)
ALT SERPL-CCNC: 58 U/L (ref 12–78)
ANION GAP SERPL CALC-SCNC: 6 MMOL/L (ref 5–15)
APPEARANCE UR: CLEAR
AST SERPL-CCNC: 29 U/L (ref 15–37)
BACTERIA URNS QL MICRO: NEGATIVE /HPF
BASOPHILS # BLD: 0 K/UL (ref 0–0.1)
BASOPHILS NFR BLD: 0 % (ref 0–1)
BILIRUB SERPL-MCNC: 0.2 MG/DL (ref 0.2–1)
BILIRUB UR QL: NEGATIVE
BUN SERPL-MCNC: 9 MG/DL (ref 6–20)
BUN/CREAT SERPL: 14 (ref 12–20)
CALCIUM SERPL-MCNC: 8.4 MG/DL (ref 8.5–10.1)
CHLORIDE SERPL-SCNC: 109 MMOL/L (ref 97–108)
CO2 SERPL-SCNC: 24 MMOL/L (ref 21–32)
COLOR UR: ABNORMAL
CREAT SERPL-MCNC: 0.64 MG/DL (ref 0.55–1.02)
DIFFERENTIAL METHOD BLD: ABNORMAL
EOSINOPHIL # BLD: 0.4 K/UL (ref 0–0.4)
EOSINOPHIL NFR BLD: 5 % (ref 0–7)
EPITH CASTS URNS QL MICRO: ABNORMAL /LPF
ERYTHROCYTE [DISTWIDTH] IN BLOOD BY AUTOMATED COUNT: 13.2 % (ref 11.5–14.5)
GLOBULIN SER CALC-MCNC: 4.4 G/DL (ref 2–4)
GLUCOSE SERPL-MCNC: 104 MG/DL (ref 65–100)
GLUCOSE UR STRIP.AUTO-MCNC: NEGATIVE MG/DL
HCG UR QL: NEGATIVE
HCT VFR BLD AUTO: 43.5 % (ref 35–47)
HGB BLD-MCNC: 14.5 G/DL (ref 11.5–16)
HGB UR QL STRIP: ABNORMAL
HYALINE CASTS URNS QL MICRO: ABNORMAL /LPF (ref 0–5)
IMM GRANULOCYTES # BLD AUTO: 0.1 K/UL (ref 0–0.04)
IMM GRANULOCYTES NFR BLD AUTO: 1 % (ref 0–0.5)
KETONES UR QL STRIP.AUTO: ABNORMAL MG/DL
LEUKOCYTE ESTERASE UR QL STRIP.AUTO: NEGATIVE
LIPASE SERPL-CCNC: 73 U/L (ref 73–393)
LYMPHOCYTES # BLD: 1.8 K/UL (ref 0.8–3.5)
LYMPHOCYTES NFR BLD: 20 % (ref 12–49)
MCH RBC QN AUTO: 30 PG (ref 26–34)
MCHC RBC AUTO-ENTMCNC: 33.3 G/DL (ref 30–36.5)
MCV RBC AUTO: 90.1 FL (ref 80–99)
MONOCYTES # BLD: 0.7 K/UL (ref 0–1)
MONOCYTES NFR BLD: 7 % (ref 5–13)
NEUTS SEG # BLD: 5.9 K/UL (ref 1.8–8)
NEUTS SEG NFR BLD: 67 % (ref 32–75)
NITRITE UR QL STRIP.AUTO: NEGATIVE
NRBC # BLD: 0 K/UL (ref 0–0.01)
NRBC BLD-RTO: 0 PER 100 WBC
PH UR STRIP: 5.5 [PH] (ref 5–8)
PLATELET # BLD AUTO: 264 K/UL (ref 150–400)
PMV BLD AUTO: 9.8 FL (ref 8.9–12.9)
POTASSIUM SERPL-SCNC: 4.5 MMOL/L (ref 3.5–5.1)
PROT SERPL-MCNC: 7.8 G/DL (ref 6.4–8.2)
PROT UR STRIP-MCNC: ABNORMAL MG/DL
RBC # BLD AUTO: 4.83 M/UL (ref 3.8–5.2)
RBC #/AREA URNS HPF: ABNORMAL /HPF (ref 0–5)
SODIUM SERPL-SCNC: 139 MMOL/L (ref 136–145)
SP GR UR REFRACTOMETRY: 1.03 (ref 1–1.03)
UROBILINOGEN UR QL STRIP.AUTO: 0.2 EU/DL (ref 0.2–1)
WBC # BLD AUTO: 8.9 K/UL (ref 3.6–11)
WBC URNS QL MICRO: ABNORMAL /HPF (ref 0–4)

## 2021-07-23 PROCEDURE — 83690 ASSAY OF LIPASE: CPT

## 2021-07-23 PROCEDURE — 81025 URINE PREGNANCY TEST: CPT

## 2021-07-23 PROCEDURE — 74011250636 HC RX REV CODE- 250/636: Performed by: EMERGENCY MEDICINE

## 2021-07-23 PROCEDURE — 96374 THER/PROPH/DIAG INJ IV PUSH: CPT

## 2021-07-23 PROCEDURE — 99283 EMERGENCY DEPT VISIT LOW MDM: CPT

## 2021-07-23 PROCEDURE — 85025 COMPLETE CBC W/AUTO DIFF WBC: CPT

## 2021-07-23 PROCEDURE — 81001 URINALYSIS AUTO W/SCOPE: CPT

## 2021-07-23 PROCEDURE — 96361 HYDRATE IV INFUSION ADD-ON: CPT

## 2021-07-23 PROCEDURE — 80053 COMPREHEN METABOLIC PANEL: CPT

## 2021-07-23 PROCEDURE — 36415 COLL VENOUS BLD VENIPUNCTURE: CPT

## 2021-07-23 RX ORDER — METOCLOPRAMIDE HYDROCHLORIDE 5 MG/ML
10 INJECTION INTRAMUSCULAR; INTRAVENOUS
Status: COMPLETED | OUTPATIENT
Start: 2021-07-23 | End: 2021-07-23

## 2021-07-23 RX ORDER — METOCLOPRAMIDE 10 MG/1
10 TABLET ORAL
Qty: 30 TABLET | Refills: 0 | Status: SHIPPED | OUTPATIENT
Start: 2021-07-23 | End: 2021-08-02

## 2021-07-23 RX ADMIN — METOCLOPRAMIDE 10 MG: 5 INJECTION, SOLUTION INTRAMUSCULAR; INTRAVENOUS at 11:13

## 2021-07-23 RX ADMIN — SODIUM CHLORIDE 1000 ML: 9 INJECTION, SOLUTION INTRAVENOUS at 11:11

## 2021-07-23 NOTE — ED PROVIDER NOTES
EMERGENCY DEPARTMENT HISTORY AND PHYSICAL EXAM      Date: 7/23/2021  Patient Name: Christi Siemens Cornell    Please note that this dictation was completed with [employer], the computer voice recognition software. Quite often unanticipated grammatical, syntax, homophones, and other interpretive errors are inadvertently transcribed by the computer software. Please disregard these errors. Please excuse any errors that have escaped final proofreading. History of Presenting Illness     Chief Complaint   Patient presents with    Vomiting     pt reports NVD and upper abdominal pain intermittently x 3 weeks. pt reports she has been seen here multiple times for the same symptoms. pt states that she was never told what was wrong with her. pt was given anti-nausea medications and famotidine and has been taking medications as prescribed.  Nausea    Diarrhea    Abdominal Pain       History Provided By: Patient     HPI: Tia August, 22 y.o. female, with a past medical history significant for chronic nausea and vomiting presented the emergency department complaining of nausea, vomiting, abdominal pain. Patient states she has been dealing with the symptoms for years. She has been seen for the same by GI, has had multiple work-ups including CT of the abdomen and pelvis, gastric emptying study, HIDA scan all of which were nondiagnostic. She states that her symptoms typically start Sunday night and go through Thursday and then resolve. Nothing makes them better. She tried Zofran and famotidine with no relief. No other exacerbating relieving factors or associated symptoms at this time    PCP: John Jean PA-C    No current facility-administered medications on file prior to encounter.      Current Outpatient Medications on File Prior to Encounter   Medication Sig Dispense Refill    omeprazole (PRILOSEC) 40 mg capsule TAKE 1 CAPSULE BY MOUTH DAILY 90 Capsule 3    temazepam (RESTORIL) 15 mg capsule Take 1 Capsule by mouth nightly as needed for Sleep. Max Daily Amount: 15 mg. 30 Capsule 2    dicyclomine (BENTYL) 20 mg tablet Take 20 mg by mouth every six (6) hours as needed.  liraglutide, weight loss, (SAXENDA) 3 mg/0.5 mL (18 mg/3 mL) pen Inject 0.6 mg daily for first week. Increase dose by 0.6 mg every week as tolerated until you reach 3 mg injection per day 3 Adjustable Dose Pre-filled Pen Syringe 2    levothyroxine (SYNTHROID) 25 mcg tablet Take 1 Tablet by mouth Daily (before breakfast). 90 Tablet 3    Insulin Needles, Disposable, 32 gauge x \" ndle Use to inject Liraglutide once daily using pen 90 Pen Needle 3    ondansetron (Zofran ODT) 4 mg disintegrating tablet Take 1 Tab by mouth every eight (8) hours as needed for Nausea.  10 Tab 0       Past History     Past Medical History:  Past Medical History:   Diagnosis Date    Acquired hypothyroidism 3/8/2021    Hearing disorder     Hepatic steatosis 3/8/2021    Psychiatric disorder     anxiety and depression since age 12    Psychiatric problem     depression       Past Surgical History:  Past Surgical History:   Procedure Laterality Date    HX GYN      c/section x1    HX ORTHOPAEDIC      left foot fracture    HX OTHER SURGICAL      mouth surgery    HX OTHER SURGICAL      wisdom teeth     HX OTHER SURGICAL      myrengotomy        Family History:  Family History   Problem Relation Age of Onset    Stroke Mother     Hypertension Father     Cancer Maternal Aunt         ovarian, breast    Hypertension Maternal Aunt     No Known Problems Maternal Uncle     Cancer Paternal Aunt         breast    Thyroid Disease Paternal Aunt     Cancer Paternal Uncle         benign brain tumor, lung    Hypertension Paternal Uncle     Depression Paternal Uncle     Suicide Paternal Uncle        Social History:  Social History     Tobacco Use    Smoking status: Former Smoker     Quit date: 2014     Years since quittin.1    Smokeless tobacco: Former User    Tobacco comment: gum   Vaping Use    Vaping Use: Never used   Substance Use Topics    Alcohol use: No    Drug use: No       Allergies:  No Known Allergies      Review of Systems   Review of Systems   Constitutional: Negative for chills and fever. HENT: Negative for congestion and sore throat. Eyes: Negative for visual disturbance. Respiratory: Negative for cough and shortness of breath. Cardiovascular: Negative for chest pain and leg swelling. Gastrointestinal: Positive for abdominal pain, nausea and vomiting. Negative for blood in stool, diarrhea and rectal pain. Endocrine: Negative for polyuria. Genitourinary: Negative for dysuria, flank pain, vaginal bleeding and vaginal discharge. Musculoskeletal: Negative for myalgias. Skin: Negative for rash. Allergic/Immunologic: Negative for immunocompromised state. Neurological: Negative for weakness and headaches. Psychiatric/Behavioral: Negative for confusion. Physical Exam   Physical Exam  Vitals and nursing note reviewed. Constitutional:       Appearance: She is well-developed. HENT:      Head: Normocephalic and atraumatic. Eyes:      General:         Right eye: No discharge. Left eye: No discharge. Conjunctiva/sclera: Conjunctivae normal.      Pupils: Pupils are equal, round, and reactive to light. Neck:      Trachea: No tracheal deviation. Cardiovascular:      Rate and Rhythm: Normal rate and regular rhythm. Heart sounds: Normal heart sounds. No murmur heard. Pulmonary:      Effort: Pulmonary effort is normal. No respiratory distress. Breath sounds: Normal breath sounds. No wheezing or rales. Abdominal:      General: Bowel sounds are normal.      Palpations: Abdomen is soft. Tenderness: There is abdominal tenderness in the epigastric area. There is no guarding or rebound. Musculoskeletal:         General: No tenderness or deformity. Normal range of motion. Cervical back: Normal range of motion and neck supple. Skin:     General: Skin is warm and dry. Findings: No erythema or rash. Neurological:      Mental Status: She is alert and oriented to person, place, and time. Psychiatric:         Behavior: Behavior normal.         Diagnostic Study Results     Labs -     No results found for this or any previous visit (from the past 12 hour(s)). Radiologic Studies -   No orders to display     CT Results  (Last 48 hours)    None        CXR Results  (Last 48 hours)    None            Medical Decision Making   I am the first provider for this patient. I reviewed the vital signs, available nursing notes, past medical history, past surgical history, family history and social history. Vital Signs-Reviewed the patient's vital signs. No data found. Records Reviewed:   Nursing notes, Prior visits     Provider Notes (Medical Decision Making):   Patient has had multiple work-ups for abdominal pain, she has had CT imaging, gastric emptying, HIDA scan all of which have been nondiagnostic. This episode is not any different than her prior. I have low clinical suspicion for appendicitis. We did discuss CT imaging, but at this time given the chronicity of her symptoms doubt that there is any acute process, CT imaging can be performed at a later date, if symptoms are not improving. Close return follow-up precautions were given to patient at time of discharge. She will be treated here in the emergency department with symptomatic management, IV fluids, Reglan. Will check basic labs to make sure there is no evidence of pancreatitis, abnormal LFTs, electrolyte abnormality from nausea and vomiting. ED Course:   Initial assessment performed. The patients presenting problems have been discussed, and they are in agreement with the care plan formulated and outlined with them. I have encouraged them to ask questions as they arise throughout their visit.     ED Course as of Jul 24 1524   Fri Jul 23, 2021   1324 Patient feeling better, nausea is resolved    [AR]      ED Course User Index  [AR] Malini Cohen DO               Critical Care Time:   none    Disposition:    DISCHARGE NOTE  Patients results have been reviewed with them. Patient and/or family have verbally conveyed their understanding and agreement of the patient's signs, symptoms, diagnosis, treatment and prognosis and additionally agree to follow up as recommended or return to the Emergency Room should their condition change or have any new concerns prior to their follow-up appointment. Patient verbally agrees with the care-plan and verbally conveys that all of their questions have been answered. Discharge instructions have also been provided to the patient with some educational information regarding their diagnosis as well a list of reasons why they would want to return to the ER prior to their follow-up appointment should their condition change. PLAN:  1. Discharge Medication List as of 7/23/2021  1:25 PM      START taking these medications    Details   metoclopramide HCl (Reglan) 10 mg tablet Take 1 Tablet by mouth Before breakfast, lunch, and dinner for 10 days. , Normal, Disp-30 Tablet, R-0         CONTINUE these medications which have NOT CHANGED    Details   omeprazole (PRILOSEC) 40 mg capsule TAKE 1 CAPSULE BY MOUTH DAILY, Normal, Disp-90 Capsule, R-3**Patient requests 90 days supply**      temazepam (RESTORIL) 15 mg capsule Take 1 Capsule by mouth nightly as needed for Sleep. Max Daily Amount: 15 mg., Normal, Disp-30 Capsule, R-2      dicyclomine (BENTYL) 20 mg tablet Take 20 mg by mouth every six (6) hours as needed., Historical Med      liraglutide, weight loss, (SAXENDA) 3 mg/0.5 mL (18 mg/3 mL) pen Inject 0.6 mg daily for first week.  Increase dose by 0.6 mg every week as tolerated until you reach 3 mg injection per day, Normal, Disp-3 Adjustable Dose Pre-filled Pen Syringe, R-2      levothyroxine (SYNTHROID) 25 mcg tablet Take 1 Tablet by mouth Daily (before breakfast). , Normal, Disp-90 Tablet, R-3      Insulin Needles, Disposable, 32 gauge x 5/32\" ndle Use to inject Liraglutide once daily using pen, Normal, Disp-90 Pen Needle, R-3      ondansetron (Zofran ODT) 4 mg disintegrating tablet Take 1 Tab by mouth every eight (8) hours as needed for Nausea., Normal, Disp-10 Tab, R-0           2. Follow-up Information     Follow up With Specialties Details Why Contact Info    Dontrell Rodarte PA-C Physician Assistant Schedule an appointment as soon as possible for a visit   [address] [place] [address]  [phone]      \[facility]\"" EMERGENCY DEPT Emergency Medicine  If symptoms worsen [address] [place] [address] Drive  [address] [facility]  [phone]          Return to ED if worse     Diagnosis     Clinical Impression:   1. Nausea and vomiting, intractability of vomiting not specified, unspecified vomiting type        Attestations:   This note was completed by Florecita Stroud DO

## 2021-07-28 DIAGNOSIS — E66.01 MORBID OBESITY (HCC): ICD-10-CM

## 2021-07-28 NOTE — TELEPHONE ENCOUNTER
----- Message from Monty Santiago sent at 7/27/2021  4:16 PM EDT -----  Regarding: /telephone  Contact: [phone]  General Message/Vendor Calls    Caller's first and last name: Felecia Guerra      Reason for call: She received a rx for weight loss \" Saxenda\"on 07/05/21, she needs some clarification on quantity.        Callback required yes/no and why: Yes      Best contact number(s):[phone]      Details to clarify the request: N/A      Monty Santiago

## 2021-07-28 NOTE — TELEPHONE ENCOUNTER
Verified patients name and date of birth. Pharmacist at [facility] stated JÄRVENPÄÄ prescription needs to be rewritten to indicate a quantity of 9 ml of product to have 3 pens dispensed.

## 2021-08-03 ENCOUNTER — TELEPHONE (OUTPATIENT)
Dept: INTERNAL MEDICINE CLINIC | Age: 26
End: 2021-08-03

## 2021-08-03 NOTE — TELEPHONE ENCOUNTER
----- Message from Branda Frankel sent at 8/3/2021  2:17 PM EDT -----  Regarding: JAROCHO Alexis/Telephone  General Message/Vendor Calls    Caller's first and last name: Pt       Reason for call: Wants to discuss changing sleep medication temazepam (RESTORIL) 15 mg capsule. She has increased the dosage and it has not helped, patient still not sleeping.        Callback required yes/no and why: yes, to discuss       Best contact number(s): [phone]      Details to clarify the request: N/A       Branda Frankel

## 2021-08-05 ENCOUNTER — VIRTUAL VISIT (OUTPATIENT)
Dept: INTERNAL MEDICINE CLINIC | Age: 26
End: 2021-08-05
Payer: MEDICAID

## 2021-08-05 VITALS — BODY MASS INDEX: 50.99 KG/M2 | HEIGHT: 65 IN

## 2021-08-05 DIAGNOSIS — K21.9 GASTROESOPHAGEAL REFLUX DISEASE WITHOUT ESOPHAGITIS: ICD-10-CM

## 2021-08-05 DIAGNOSIS — E03.9 ACQUIRED HYPOTHYROIDISM: ICD-10-CM

## 2021-08-05 DIAGNOSIS — E66.01 MORBID OBESITY (HCC): ICD-10-CM

## 2021-08-05 DIAGNOSIS — F51.01 PRIMARY INSOMNIA: Primary | ICD-10-CM

## 2021-08-05 PROCEDURE — 99213 OFFICE O/P EST LOW 20 MIN: CPT | Performed by: PHYSICIAN ASSISTANT

## 2021-08-05 RX ORDER — GABAPENTIN 300 MG/1
CAPSULE ORAL
Qty: 60 CAPSULE | Refills: 1 | Status: SHIPPED | OUTPATIENT
Start: 2021-08-05 | End: 2021-08-25 | Stop reason: DRUGHIGH

## 2021-08-05 RX ORDER — FAMOTIDINE 40 MG/1
40 TABLET, FILM COATED ORAL DAILY
COMMUNITY
Start: 2021-07-29

## 2021-08-05 RX ORDER — ONDANSETRON 4 MG/1
4 TABLET, ORALLY DISINTEGRATING ORAL
Qty: 10 TABLET | Refills: 0 | Status: SHIPPED | OUTPATIENT
Start: 2021-08-05

## 2021-08-05 NOTE — PROGRESS NOTES
Camryn Lindsey [place]  Identified pt with two pt identifiers(name and ). Chief Complaint   Patient presents with    Sleep Problem       Reviewed record In preparation for visit and have obtained necessary documentation. 1. Have you been to the ER, urgent care clinic or hospitalized since your last visit? No     2. Have you seen or consulted any other health care providers outside of the [address] [place] [address] since your last visit? Include any pap smears or colon screening. No    Vitals reviewed with provider. Health Maintenance reviewed:     Health Maintenance Due   Topic    Hepatitis C Screening     HPV Age 9Y-34Y (2 - 2-dose series)    PAP AKA CERVICAL CYTOLOGY     COVID-19 Vaccine (2 - Pfizer 2-dose series)        Wt Readings from Last 3 Encounters:   21 306 lb 7 oz (139 kg)   21 313 lb (142 kg)   21 306 lb (138.8 kg)      Temp Readings from Last 3 Encounters:   21 97.9 °F (36.6 °C)   21 99.3 °F (37.4 °C) (Oral)   21 97.8 °F (36.6 °C)      BP Readings from Last 3 Encounters:   21 (!) 136/53   21 (!) 91/56   21 116/88      Pulse Readings from Last 3 Encounters:   21 80   21 87   21 94      There were no vitals filed for this visit. Learning Assessment:   :     Learning Assessment 3/8/2021   PRIMARY LEARNER Patient   HIGHEST LEVEL OF EDUCATION - PRIMARY LEARNER  SOME COLLEGE   BARRIERS PRIMARY LEARNER NONE   CO-LEARNER CAREGIVER No   PRIMARY LANGUAGE ENGLISH   LEARNER PREFERENCE PRIMARY DEMONSTRATION   ANSWERED BY patient   RELATIONSHIP SELF        Depression Screening:   :     3 most recent PHQ Screens 2021   PHQ Not Done Active Diagnosis of Depression or Bipolar Disorder   Little interest or pleasure in doing things -   Feeling down, depressed, irritable, or hopeless -   Total Score PHQ 2 -        Fall Risk Assessment:   :     No flowsheet data found. Abuse Screening:   :     No flowsheet data found. ADL Screening:   :     ADL Assessment 3/8/2021   Feeding yourself No Help Needed   Getting from bed to chair No Help Needed   Getting dressed No Help Needed   Bathing or showering No Help Needed   Walk across the room (includes cane/walker) No Help Needed   Using the telphone No Help Needed   Taking your medications No Help Needed   Preparing meals No Help Needed   Managing money (expenses/bills) No Help Needed   Moderately strenuous housework (laundry) No Help Needed   Shopping for personal items (toiletries/medicines) No Help Needed   Shopping for groceries No Help Needed   Driving No Help Needed   Climbing a flight of stairs No Help Needed   Getting to places beyond walking distances No Help Needed

## 2021-08-05 NOTE — PROGRESS NOTES
Jia Shearer is a 22 y.o. female who was seen by synchronous (real-time) audio-video technology on 8/5/2021 for Sleep Problem        Assessment & Plan:   Diagnoses and all orders for this visit:    1. Primary insomnia  -     gabapentin (NEURONTIN) 300 mg capsule; Take 1-2 capsules at night 30 minutes before planned bedtime  for insomnia  Discontinue Temazepam. Will trial Gabapentin 300-600 a night. May need to trial dual therapy, perhaps Clonazepam or flexeril as well. 2. Acquired hypothyroidism  Needs recheck of TSH at next lab draw  3. Morbid obesity ([facility] [address]  Continue Saxenda  4. Gastroesophageal reflux disease without esophagitis  FolloWed by GI, continue PPI for now, refill Zofran  Other orders  -     ondansetron (Zofran ODT) 4 mg disintegrating tablet; Take 1 Tablet by mouth every eight (8) hours as needed for Nausea. The complexity of medical decision making for this visit is moderate     I spent at least 25 minutes on this visit with this established patient. 712  Subjective:   Patient presents for follow up on insomnia and obesity. She had used Saxenda up through 1.8 mg per week but the pharmacies in the area have been out so she is awaiting a refill. She was doing well with it before running out. She had tried 15 mg temazepam x 1 month without relief. She trialed 15 mg and 30 mg at night without any respite. She continues to state she cannot seem to turn her brain off. She does not have problems waking once she is asleep, unless her children come wake her (she has 4 small children in the house). She is due to get her sleep medicine CPAP equipment on 8/13    She is continuing to work with GI for GERD. She was seen in the ED for nausea and vomiting and is requesting a refill of Zofran, though acute symptoms have resolved. Prior to Admission medications    Medication Sig Start Date End Date Taking?  Authorizing Provider   famotidine (PEPCID) 40 mg tablet Take 40 mg by mouth daily. 7/29/21  Yes Provider, Historical   liraglutide, weight loss, (SAXENDA) 3 mg/0.5 mL (18 mg/3 mL) pen Inject 0.6 mg daily for first week. Increase dose by 0.6 mg every week as tolerated until you reach 3 mg injection per day 7/28/21  Yes Basilio Vaughn PA-C   omeprazole (PRILOSEC) 40 mg capsule TAKE 1 CAPSULE BY MOUTH DAILY 6/28/21  Yes Basilio Vaughn PA-C   temazepam (RESTORIL) 15 mg capsule Take 1 Capsule by mouth nightly as needed for Sleep. Max Daily Amount: 15 mg. 6/24/21  Yes Basilio Vaughn PA-C   dicyclomine (BENTYL) 20 mg tablet Take 20 mg by mouth every six (6) hours as needed. 4/7/21  Yes Provider, Historical   levothyroxine (SYNTHROID) 25 mcg tablet Take 1 Tablet by mouth Daily (before breakfast). 6/23/21  Yes Basilio Vaughn PA-C   Insulin Needles, Disposable, 32 gauge x 5/32\" ndle Use to inject Liraglutide once daily using pen 6/23/21  Yes Basilio Vaughn PA-C   ondansetron (Zofran ODT) 4 mg disintegrating tablet Take 1 Tab by mouth every eight (8) hours as needed for Nausea. 5/5/21  Yes Jeffrie Kayser, April N, MD     Patient Active Problem List   Diagnosis Code    Pregnant Z34.90    Hepatic steatosis K76.0    Acquired hypothyroidism E03.9    Bilateral plantar fasciitis M72.2    Gastroesophageal reflux disease without esophagitis K21.9    Overactive bladder N32.81    Primary insomnia F51.01     Patient Active Problem List    Diagnosis Date Noted    Hepatic steatosis 03/08/2021    Acquired hypothyroidism 03/08/2021    Bilateral plantar fasciitis 03/08/2021    Gastroesophageal reflux disease without esophagitis 03/08/2021    Overactive bladder 03/08/2021    Primary insomnia 03/08/2021    Pregnant 07/16/2014     Current Outpatient Medications   Medication Sig Dispense Refill    famotidine (PEPCID) 40 mg tablet Take 40 mg by mouth daily.  liraglutide, weight loss, (SAXENDA) 3 mg/0.5 mL (18 mg/3 mL) pen Inject 0.6 mg daily for first week.  Increase dose by 0.6 mg every week as tolerated until you reach 3 mg injection per day 9 mL 2    omeprazole (PRILOSEC) 40 mg capsule TAKE 1 CAPSULE BY MOUTH DAILY 90 Capsule 3    temazepam (RESTORIL) 15 mg capsule Take 1 Capsule by mouth nightly as needed for Sleep. Max Daily Amount: 15 mg. 30 Capsule 2    dicyclomine (BENTYL) 20 mg tablet Take 20 mg by mouth every six (6) hours as needed.  levothyroxine (SYNTHROID) 25 mcg tablet Take 1 Tablet by mouth Daily (before breakfast). 90 Tablet 3    Insulin Needles, Disposable, 32 gauge x \" ndle Use to inject Liraglutide once daily using pen 90 Pen Needle 3    ondansetron (Zofran ODT) 4 mg disintegrating tablet Take 1 Tab by mouth every eight (8) hours as needed for Nausea.  10 Tab 0     No Known Allergies  Past Medical History:   Diagnosis Date    Acquired hypothyroidism 3/8/2021    Hearing disorder     Hepatic steatosis 3/8/2021    Psychiatric disorder     anxiety and depression since age 12    Psychiatric problem     depression     Past Surgical History:   Procedure Laterality Date    HX GYN      c/section x1    HX ORTHOPAEDIC      left foot fracture    HX OTHER SURGICAL      mouth surgery    HX OTHER SURGICAL      wisdom teeth 2013    HX OTHER SURGICAL      myrengotomy 2010     Family History   Problem Relation Age of Onset    Stroke Mother     Hypertension Father     Cancer Maternal Aunt         ovarian, breast    Hypertension Maternal Aunt     No Known Problems Maternal Uncle     Cancer Paternal Aunt         breast    Thyroid Disease Paternal Aunt     Cancer Paternal Uncle         benign brain tumor, lung    Hypertension Paternal Uncle     Depression Paternal Uncle     Suicide Paternal Uncle      Social History     Tobacco Use    Smoking status: Former Smoker     Quit date: 2014     Years since quittin.2    Smokeless tobacco: Former User    Tobacco comment: gum   Substance Use Topics    Alcohol use: No       Review of Systems   Constitutional: Negative for chills, fever, malaise/fatigue and weight loss. HENT: Negative for ear pain, hearing loss and sore throat. Respiratory: Negative for cough and shortness of breath. Cardiovascular: Negative for chest pain and leg swelling. Gastrointestinal: Negative for abdominal pain, blood in stool, constipation, diarrhea, heartburn, melena, nausea and vomiting. Genitourinary: Negative for dysuria and hematuria. Musculoskeletal: Negative for back pain and myalgias. Skin: Negative for rash. Neurological: Negative for weakness and headaches. Psychiatric/Behavioral: Negative for depression. The patient has insomnia. Objective:     Patient-Reported Vitals 8/5/2021   Patient-Reported Weight 305   Patient-Reported Temperature 98.6   Patient-Reported LMP 3 years ago      General: alert, cooperative, no distress   Mental  status: normal mood, behavior, speech, dress, motor activity, and thought processes, able to follow commands   HENT: NCAT   Neck: no visualized mass   Resp: no respiratory distress   Neuro: no gross deficits   Skin: no discoloration or lesions of concern on visible areas   Psychiatric: normal affect, consistent with stated mood, no evidence of hallucinations     Additional exam findings: We discussed the expected course, resolution and complications of the diagnosis(es) in detail. Medication risks, benefits, costs, interactions, and alternatives were discussed as indicated. I advised her to contact the office if her condition worsens, changes or fails to improve as anticipated. She expressed understanding with the diagnosis(es) and plan. Aury Sargent, was evaluated through a synchronous (real-time) audio-video encounter. The patient (or guardian if applicable) is aware that this is a billable service. Verbal consent to proceed has been obtained within the past 12 months.  The visit was conducted pursuant to the emergency declaration under the [address] Emergencies Act, [facility] waiver authority and the Coronavirus Preparedness and Response Supplemental Appropriations Act. Patient identification was verified, and a caregiver was present when appropriate. The patient was located in a state where the provider was credentialed to provide care.     Julianne Molina PA-C

## 2021-08-25 ENCOUNTER — VIRTUAL VISIT (OUTPATIENT)
Dept: INTERNAL MEDICINE CLINIC | Age: 26
End: 2021-08-25
Payer: MEDICAID

## 2021-08-25 DIAGNOSIS — E66.01 MORBID OBESITY WITH BMI OF 50.0-59.9, ADULT (HCC): ICD-10-CM

## 2021-08-25 DIAGNOSIS — F31.81 BIPOLAR 2 DISORDER (HCC): ICD-10-CM

## 2021-08-25 DIAGNOSIS — E03.9 ACQUIRED HYPOTHYROIDISM: ICD-10-CM

## 2021-08-25 DIAGNOSIS — G47.33 OSA (OBSTRUCTIVE SLEEP APNEA): ICD-10-CM

## 2021-08-25 DIAGNOSIS — F51.01 PRIMARY INSOMNIA: Primary | ICD-10-CM

## 2021-08-25 DIAGNOSIS — Z11.59 ENCOUNTER FOR HEPATITIS C SCREENING TEST FOR LOW RISK PATIENT: ICD-10-CM

## 2021-08-25 PROCEDURE — 99213 OFFICE O/P EST LOW 20 MIN: CPT | Performed by: PHYSICIAN ASSISTANT

## 2021-08-25 RX ORDER — GABAPENTIN 400 MG/1
CAPSULE ORAL
Qty: 60 CAPSULE | Refills: 2 | Status: SHIPPED | OUTPATIENT
Start: 2021-08-25 | End: 2021-12-23 | Stop reason: SDUPTHER

## 2021-08-25 RX ORDER — SERTRALINE HYDROCHLORIDE 25 MG/1
25 TABLET, FILM COATED ORAL DAILY
Qty: 30 TABLET | Refills: 2 | Status: SHIPPED | OUTPATIENT
Start: 2021-08-25 | End: 2021-12-23 | Stop reason: SDUPTHER

## 2021-08-25 RX ORDER — QUETIAPINE FUMARATE 50 MG/1
50 TABLET, FILM COATED ORAL
Qty: 30 TABLET | Refills: 2 | Status: SHIPPED | OUTPATIENT
Start: 2021-08-25 | End: 2021-10-21

## 2021-08-25 NOTE — PROGRESS NOTES
Conda Soulier is a 22y.o. year old female seen in clinic today for No chief complaint on file. she is here today to follow up for insomnia, DAVID, Obesity, GERD    From previous note:  She has tried ramelteon, Sarahi without any improvement in sleep. She has seen sleep medicine who diagnosed her with sleep apnea but she has not been able to get her CPAP yet. She notes she does not go to bed until she is tired which often ends up being 2-4 AM. She reports she goes significant times without sleeping. She states once she is asleep she sleeps fine until one of her children wakes her.      She notes she has seen GI and had EGD. She is planning to have gastric emptying study. She had multiple areas of inflammation on esophagus and stomach. She also recently had ED visit involving diffuse vomiting episodes. She has since had intermittent episodes of chest pains. They last a few seconds before resolving but do stop her in her tracks with pressure. She notes it does not radiate and she has no other symptoms including SOB, dizziness, HA, arm pain or neck pain.      She reports she has been cutting calorie intake to 1200 cals a day. Has been seeing dietician. Plans to have gastric bypass next year. Runs daily. Is interested in weight loss help. Last visit we decided to try gabapentin as a sleep aid. ***    she specifically denies any CP, SOB, HA. Dizziness, fevers, chills, N/V/D, urinary symptoms or other bowel changes. Current Outpatient Medications on File Prior to Visit   Medication Sig Dispense Refill    famotidine (PEPCID) 40 mg tablet Take 40 mg by mouth daily.  ondansetron (Zofran ODT) 4 mg disintegrating tablet Take 1 Tablet by mouth every eight (8) hours as needed for Nausea.  10 Tablet 0    gabapentin (NEURONTIN) 300 mg capsule Take 1-2 capsules at night 30 minutes before planned bedtime  for insomnia 60 Capsule 1    liraglutide, weight loss, (SAXENDA) 3 mg/0.5 mL (18 mg/3 mL) pen Inject 0.6 mg daily for first week. Increase dose by 0.6 mg every week as tolerated until you reach 3 mg injection per day 9 mL 2    omeprazole (PRILOSEC) 40 mg capsule TAKE 1 CAPSULE BY MOUTH DAILY 90 Capsule 3    dicyclomine (BENTYL) 20 mg tablet Take 20 mg by mouth every six (6) hours as needed.  levothyroxine (SYNTHROID) 25 mcg tablet Take 1 Tablet by mouth Daily (before breakfast). 90 Tablet 3    Insulin Needles, Disposable, 32 gauge x \" ndle Use to inject Liraglutide once daily using pen 90 Pen Needle 3     No current facility-administered medications on file prior to visit.          No Known Allergies  Past Medical History:   Diagnosis Date    Acquired hypothyroidism 3/8/2021    Hearing disorder     Hepatic steatosis 3/8/2021    Psychiatric disorder     anxiety and depression since age 12    Psychiatric problem     depression      Past Surgical History:   Procedure Laterality Date    HX GYN      c/section x1    HX ORTHOPAEDIC      left foot fracture    HX OTHER SURGICAL      mouth surgery    HX OTHER SURGICAL      wisdom teeth     HX OTHER SURGICAL      myrengotomy 2010        Family History   Problem Relation Age of Onset    Stroke Mother     Hypertension Father     Cancer Maternal Aunt         ovarian, breast    Hypertension Maternal Aunt     No Known Problems Maternal Uncle     Cancer Paternal Aunt         breast    Thyroid Disease Paternal Aunt     Cancer Paternal Uncle         benign brain tumor, lung    Hypertension Paternal Uncle     Depression Paternal Uncle     Suicide Paternal Uncle         Social History     Socioeconomic History    Marital status: SINGLE     Spouse name: Not on file    Number of children: Not on file    Years of education: Not on file    Highest education level: Not on file   Occupational History    Not on file   Tobacco Use    Smoking status: Former Smoker     Quit date: 2014     Years since quittin.2    Smokeless tobacco: Former User    Tobacco comment: gum   Vaping Use    Vaping Use: Never used   Substance and Sexual Activity    Alcohol use: No    Drug use: No    Sexual activity: Never     Partners: Male   Other Topics Concern    Not on file   Social History Narrative    Not on file     Social Determinants of Health     Financial Resource Strain:     Difficulty of Paying Living Expenses:    Food Insecurity:     Worried About Running Out of Food in the Last Year:     [address] in the Last Year:    Transportation Needs:     Lack of Transportation (Medical):  Lack of Transportation (Non-Medical):    Physical Activity:     Days of Exercise per Week:     Minutes of Exercise per Session:    Stress:     Feeling of Stress :    Social Connections:     Frequency of Communication with Friends and Family:     Frequency of Social Gatherings with Friends and Family:     Attends Confucianism Services:     Active Member of Clubs or Organizations:     Attends Club or Organization Meetings:     Marital Status:    Intimate Partner Violence:     Fear of Current or Ex-Partner:     Emotionally Abused:     Physically Abused:     Sexually Abused: There were no vitals taken for this visit. ROS   Physical Exam     No results found for this or any previous visit (from the past 24 hour(s)). ASSESSMENT AND PLAN  {There are no diagnoses linked to this encounter. (Refresh or delete this SmartLink)}       I have discussed the diagnosis with the patient and the intended plan as seen in the above orders. Patient is in agreement. The patient has received an after-visit summary and questions were answered concerning future plans. I have discussed medication side effects and warnings with the patient as well.     Faheem Rodriguez PA-C

## 2021-08-25 NOTE — PROGRESS NOTES
[facility]  Identified pt with two pt identifiers(name and ). Chief Complaint   Patient presents with    Insomnia       Reviewed record In preparation for visit and have obtained necessary documentation. 1. Have you been to the ER, urgent care clinic or hospitalized since your last visit? No     2. Have you seen or consulted any other health care providers outside of the [address] [place] [address] since your last visit? Include any pap smears or colon screening. No    Vitals reviewed with provider. Health Maintenance reviewed:     Health Maintenance Due   Topic    Hepatitis C Screening     HPV Age 9Y-34Y (2 - 2-dose series)    PAP AKA CERVICAL CYTOLOGY         Wt Readings from Last 3 Encounters:   21 306 lb 7 oz (139 kg)   21 313 lb (142 kg)   21 306 lb (138.8 kg)      Temp Readings from Last 3 Encounters:   21 97.9 °F (36.6 °C)   21 99.3 °F (37.4 °C) (Oral)   21 97.8 °F (36.6 °C)      BP Readings from Last 3 Encounters:   21 (!) 136/53   21 (!) 91/56   21 116/88      Pulse Readings from Last 3 Encounters:   21 80   21 87   21 94      There were no vitals filed for this visit. Learning Assessment:   :     Learning Assessment 3/8/2021   PRIMARY LEARNER Patient   HIGHEST LEVEL OF EDUCATION - PRIMARY LEARNER  SOME COLLEGE   BARRIERS PRIMARY LEARNER NONE   CO-LEARNER CAREGIVER No   PRIMARY LANGUAGE ENGLISH   LEARNER PREFERENCE PRIMARY DEMONSTRATION   ANSWERED BY patient   RELATIONSHIP SELF        Depression Screening:   :     3 most recent PHQ Screens 2021   PHQ Not Done Active Diagnosis of Depression or Bipolar Disorder   Little interest or pleasure in doing things -   Feeling down, depressed, irritable, or hopeless -   Total Score PHQ 2 -        Fall Risk Assessment:   :     No flowsheet data found. Abuse Screening:   :     No flowsheet data found.      ADL Screening:   :     ADL Assessment 3/8/2021 Feeding yourself No Help Needed   Getting from bed to chair No Help Needed   Getting dressed No Help Needed   Bathing or showering No Help Needed   Walk across the room (includes cane/walker) No Help Needed   Using the telphone No Help Needed   Taking your medications No Help Needed   Preparing meals No Help Needed   Managing money (expenses/bills) No Help Needed   Moderately strenuous housework (laundry) No Help Needed   Shopping for personal items (toiletries/medicines) No Help Needed   Shopping for groceries No Help Needed   Driving No Help Needed   Climbing a flight of stairs No Help Needed   Getting to places beyond walking distances No Help Needed

## 2021-08-25 NOTE — PROGRESS NOTES
Jigar Herrera is a 22 y.o. female who was seen by synchronous (real-time) audio-video technology on 8/25/2021 for Insomnia        Assessment & Plan:   Diagnoses and all orders for this visit:    1. Primary insomnia  -     REFERRAL TO PSYCHIATRY  -     QUEtiapine (SEROquel) 50 mg tablet; Take 1 Tablet by mouth nightly.  -     gabapentin (NEURONTIN) 400 mg capsule; Take 2 capsules at night for insomnia  Continue high dose gabapentin will sleep and add low dose Seroquel for bipolar night time treatment and sleep aid  2. Bipolar 2 disorder (HCC)  -     REFERRAL TO PSYCHIATRY  -     QUEtiapine (SEROquel) 50 mg tablet; Take 1 Tablet by mouth nightly. -     sertraline (ZOLOFT) 25 mg tablet; Take 1 Tablet by mouth daily. Given referral for psych   3. Morbid obesity with BMI of 50.0-59.9, adult (HCC)  -     METABOLIC PANEL, COMPREHENSIVE; Future  -     LIPID PANEL; Future  Continue Saxenda weekly  4. DAVID (obstructive sleep apnea)  Call about machine  5. Encounter for hepatitis C screening test for low risk patient  -     HEPATITIS C AB; Future    6. Acquired hypothyroidism  -     THYROID CASCADE PROFILE; Future  Recheck thyroid, on 25 mcg    The complexity of medical decision making for this visit is moderate     I spent at least 20 minutes on this visit with this established patient. 712  Subjective:   Patient presents to follow up for insomnia. She reports last Rx of 300 mg gabapenin has worked at 900 mg at night most nights. Last night she slept 8 hours but still could not fall asleep for 2 hours. Her psychologist advised considering treating Bipolar that she was diagnosed with as a child in her teenage years. She had used Seroquel before. She admits she does have some mood swings. She has not received her sleep machine for DAVID. She has called but has not heard back.     She notes she is up to max dose of Saxenda and is doing well but is not gaining or losing this week as she is home quarantining with her kids.         Prior to Admission medications    Medication Sig Start Date End Date Taking? Authorizing Provider   QUEtiapine (SEROquel) 50 mg tablet Take 1 Tablet by mouth nightly. 8/25/21  Yes XOCHITL AkbarC   sertraline (ZOLOFT) 25 mg tablet Take 1 Tablet by mouth daily. 8/25/21  Yes Jeri Counts PA-C   gabapentin (NEURONTIN) 400 mg capsule Take 2 capsules at night for insomnia 8/25/21  Yes Jeri Counts PA-C   famotidine (PEPCID) 40 mg tablet Take 40 mg by mouth daily. 7/29/21  Yes Provider, Historical   ondansetron (Zofran ODT) 4 mg disintegrating tablet Take 1 Tablet by mouth every eight (8) hours as needed for Nausea. 8/5/21  Yes Jeri Navjot PA-C   liraglutide, weight loss, (SAXENDA) 3 mg/0.5 mL (18 mg/3 mL) pen Inject 0.6 mg daily for first week. Increase dose by 0.6 mg every week as tolerated until you reach 3 mg injection per day 7/28/21  Yes Jeri Navjot PA-C   omeprazole (PRILOSEC) 40 mg capsule TAKE 1 CAPSULE BY MOUTH DAILY 6/28/21  Yes Jeri Morfin PAAilynC   dicyclomine (BENTYL) 20 mg tablet Take 20 mg by mouth every six (6) hours as needed. 4/7/21  Yes Provider, Historical   levothyroxine (SYNTHROID) 25 mcg tablet Take 1 Tablet by mouth Daily (before breakfast).  6/23/21  Yes Jeri Navjot PA-C   Insulin Needles, Disposable, 32 gauge x 5/32\" ndle Use to inject Liraglutide once daily using pen 6/23/21  Yes Jeri Counts PA-C   gabapentin (NEURONTIN) 300 mg capsule Take 1-2 capsules at night 30 minutes before planned bedtime  for insomnia  Patient taking differently: Take 1-2 capsules at night 30 minutes before planned bedtime  for insomnia pt is taking 3 tablets at bedtime 8/5/21 8/25/21  Jeri Counts PA-C     Patient Active Problem List   Diagnosis Code    Pregnant Z34.90    Hepatic steatosis K76.0    Acquired hypothyroidism E03.9    Bilateral plantar fasciitis M72.2    Gastroesophageal reflux disease without esophagitis K21.9    Overactive bladder N32.81  Primary insomnia F51.01     Patient Active Problem List    Diagnosis Date Noted    Hepatic steatosis 03/08/2021    Acquired hypothyroidism 03/08/2021    Bilateral plantar fasciitis 03/08/2021    Gastroesophageal reflux disease without esophagitis 03/08/2021    Overactive bladder 03/08/2021    Primary insomnia 03/08/2021    Pregnant 07/16/2014     Current Outpatient Medications   Medication Sig Dispense Refill    QUEtiapine (SEROquel) 50 mg tablet Take 1 Tablet by mouth nightly. 30 Tablet 2    sertraline (ZOLOFT) 25 mg tablet Take 1 Tablet by mouth daily. 30 Tablet 2    gabapentin (NEURONTIN) 400 mg capsule Take 2 capsules at night for insomnia 60 Capsule 2    famotidine (PEPCID) 40 mg tablet Take 40 mg by mouth daily.  ondansetron (Zofran ODT) 4 mg disintegrating tablet Take 1 Tablet by mouth every eight (8) hours as needed for Nausea. 10 Tablet 0    liraglutide, weight loss, (SAXENDA) 3 mg/0.5 mL (18 mg/3 mL) pen Inject 0.6 mg daily for first week. Increase dose by 0.6 mg every week as tolerated until you reach 3 mg injection per day 9 mL 2    omeprazole (PRILOSEC) 40 mg capsule TAKE 1 CAPSULE BY MOUTH DAILY 90 Capsule 3    dicyclomine (BENTYL) 20 mg tablet Take 20 mg by mouth every six (6) hours as needed.  levothyroxine (SYNTHROID) 25 mcg tablet Take 1 Tablet by mouth Daily (before breakfast).  90 Tablet 3    Insulin Needles, Disposable, 32 gauge x 5/32\" ndle Use to inject Liraglutide once daily using pen 90 Pen Needle 3     No Known Allergies  Past Medical History:   Diagnosis Date    Acquired hypothyroidism 3/8/2021    Hearing disorder     Hepatic steatosis 3/8/2021    Psychiatric disorder     anxiety and depression since age 12    Psychiatric problem     depression     Past Surgical History:   Procedure Laterality Date    HX GYN      c/section x1    HX ORTHOPAEDIC      left foot fracture    HX OTHER SURGICAL      mouth surgery    HX OTHER SURGICAL      wisdom teeth 2013    HX OTHER SURGICAL      myrengotomy 2010     Family History   Problem Relation Age of Onset    Stroke Mother     Hypertension Father     Cancer Maternal Aunt         ovarian, breast    Hypertension Maternal Aunt     No Known Problems Maternal Uncle     Cancer Paternal Aunt         breast    Thyroid Disease Paternal Aunt     Cancer Paternal Uncle         benign brain tumor, lung    Hypertension Paternal Uncle     Depression Paternal Uncle     Suicide Paternal Uncle      Social History     Tobacco Use    Smoking status: Former Smoker     Quit date: 2014     Years since quittin.2    Smokeless tobacco: Former User    Tobacco comment: gum   Substance Use Topics    Alcohol use: No       Review of Systems   Constitutional: Negative for chills, fever, malaise/fatigue and weight loss. HENT: Negative for ear pain, hearing loss and sore throat. Respiratory: Negative for cough and shortness of breath. Cardiovascular: Negative for chest pain and leg swelling. Gastrointestinal: Negative for abdominal pain, blood in stool, constipation, diarrhea, heartburn, melena, nausea and vomiting. Genitourinary: Negative for dysuria and hematuria. Musculoskeletal: Negative for back pain and myalgias. Skin: Negative for rash. Neurological: Negative for weakness and headaches. Psychiatric/Behavioral: Negative for depression.        Objective:     Patient-Reported Vitals 2021   Patient-Reported Weight 305   Patient-Reported Temperature 98.6   Patient-Reported LMP 3 years ago      General: alert, cooperative, no distress   Mental  status: normal mood, behavior, speech, dress, motor activity, and thought processes, able to follow commands   HENT: NCAT   Neck: no visualized mass   Resp: no respiratory distress   Neuro: no gross deficits   Skin: no discoloration or lesions of concern on visible areas   Psychiatric: normal affect, consistent with stated mood, no evidence of hallucinations     Additional exam findings: We discussed the expected course, resolution and complications of the diagnosis(es) in detail. Medication risks, benefits, costs, interactions, and alternatives were discussed as indicated. I advised her to contact the office if her condition worsens, changes or fails to improve as anticipated. She expressed understanding with the diagnosis(es) and plan. Omar Gurrola, was evaluated through a synchronous (real-time) audio-video encounter. The patient (or guardian if applicable) is aware that this is a billable service. Verbal consent to proceed has been obtained within the past 12 months. The visit was conducted pursuant to the emergency declaration under the [facility], [address] [place] [address] authority and the [employer] General Act. Patient identification was verified, and a caregiver was present when appropriate. The patient was located in a state where the provider was credentialed to provide care.     Faheem Rodriguez PA-C

## 2021-09-08 DIAGNOSIS — E66.01 MORBID OBESITY (HCC): ICD-10-CM

## 2021-09-08 RX ORDER — LIRAGLUTIDE 6 MG/ML
INJECTION, SOLUTION SUBCUTANEOUS
Qty: 9 ML | Refills: 2 | Status: SHIPPED | OUTPATIENT
Start: 2021-09-08 | End: 2021-12-23

## 2021-10-21 DIAGNOSIS — F31.81 BIPOLAR 2 DISORDER (HCC): ICD-10-CM

## 2021-10-21 DIAGNOSIS — F51.01 PRIMARY INSOMNIA: ICD-10-CM

## 2021-10-21 RX ORDER — QUETIAPINE FUMARATE 50 MG/1
TABLET, FILM COATED ORAL
Qty: 30 TABLET | Refills: 2 | Status: SHIPPED | OUTPATIENT
Start: 2021-10-21 | End: 2022-03-16 | Stop reason: SDUPTHER

## 2021-11-19 ENCOUNTER — TELEPHONE (OUTPATIENT)
Dept: INTERNAL MEDICINE CLINIC | Age: 26
End: 2021-11-19

## 2021-11-19 NOTE — TELEPHONE ENCOUNTER
----- Message from Darlyn Howard sent at 11/19/2021 11:41 AM EST -----  Subject: Refill Request    QUESTIONS  Name of Medication? QUEtiapine (SEROquel) 50 mg tablet  Patient-reported dosage and instructions? every night  How many days do you have left? 1  [id number]  Pharmacy phone number (if available)? [phone]  Additional Information for Provider? patient has med check appt for 12/23,   would like meds refilled prior to appt   ---------------------------------------------------------------------------  --------------  [address]  What is the best way for the office to contact you? OK to leave message on   voicemail  Preferred Call Back Phone Number?  [phone]

## 2021-11-19 NOTE — TELEPHONE ENCOUNTER
I called the patient and informed her that we sent in a prescription on 10/21/2021 with 2 additional refills. She stated she went to the pharmacy last week and they told her that did not have it. I called the pharmacy to verify they received the prescription. They confirmed and will process the order. I called the patient back to let her know. She stated understanding and had no further questions.

## 2021-12-23 ENCOUNTER — VIRTUAL VISIT (OUTPATIENT)
Dept: INTERNAL MEDICINE CLINIC | Age: 26
End: 2021-12-23
Payer: MEDICAID

## 2021-12-23 DIAGNOSIS — E66.01 MORBID OBESITY WITH BMI OF 50.0-59.9, ADULT (HCC): ICD-10-CM

## 2021-12-23 DIAGNOSIS — K76.0 HEPATIC STEATOSIS: ICD-10-CM

## 2021-12-23 DIAGNOSIS — K21.9 GASTROESOPHAGEAL REFLUX DISEASE WITHOUT ESOPHAGITIS: ICD-10-CM

## 2021-12-23 DIAGNOSIS — F51.01 PRIMARY INSOMNIA: ICD-10-CM

## 2021-12-23 DIAGNOSIS — F41.9 ANXIETY: ICD-10-CM

## 2021-12-23 DIAGNOSIS — F31.81 BIPOLAR 2 DISORDER (HCC): ICD-10-CM

## 2021-12-23 DIAGNOSIS — E03.9 ACQUIRED HYPOTHYROIDISM: Primary | ICD-10-CM

## 2021-12-23 DIAGNOSIS — M72.2 PLANTAR FASCIITIS: ICD-10-CM

## 2021-12-23 PROCEDURE — 99214 OFFICE O/P EST MOD 30 MIN: CPT | Performed by: PHYSICIAN ASSISTANT

## 2021-12-23 RX ORDER — SEMAGLUTIDE 0.25 MG/.5ML
INJECTION, SOLUTION SUBCUTANEOUS
Qty: 4 EACH | Refills: 2 | Status: SHIPPED | OUTPATIENT
Start: 2021-12-23 | End: 2022-03-17

## 2021-12-23 RX ORDER — GABAPENTIN 400 MG/1
CAPSULE ORAL
Qty: 60 CAPSULE | Refills: 2 | Status: SHIPPED | OUTPATIENT
Start: 2021-12-23 | End: 2022-08-09 | Stop reason: SDUPTHER

## 2021-12-23 RX ORDER — SERTRALINE HYDROCHLORIDE 50 MG/1
50 TABLET, FILM COATED ORAL DAILY
Qty: 90 TABLET | Refills: 1 | Status: SHIPPED | OUTPATIENT
Start: 2021-12-23 | End: 2022-08-09 | Stop reason: SDUPTHER

## 2021-12-23 NOTE — PROGRESS NOTES
[facility] with two pt identifiers(name and ). Chief Complaint   Patient presents with    Sleep Problem       Reviewed record In preparation for visit and have obtained necessary documentation. 1. Have you been to the ER, urgent care clinic or hospitalized since your last visit? No     2. Have you seen or consulted any other health care providers outside of the [address] [place] [address] since your last visit? Include any pap smears or colon screening. No    Vitals reviewed with provider.     Health Maintenance reviewed:     Health Maintenance Due   Topic    Hepatitis C Screening     HPV Age 9Y-34Y (2 - 2-dose series)    Pap Smear     Flu Vaccine (1)          Wt Readings from Last 3 Encounters:   21 306 lb 7 oz (139 kg)   21 313 lb (142 kg)   21 306 lb (138.8 kg)        Temp Readings from Last 3 Encounters:   21 97.9 °F (36.6 °C)   21 99.3 °F (37.4 °C) (Oral)   21 97.8 °F (36.6 °C)        BP Readings from Last 3 Encounters:   21 (!) 136/53   21 (!) 91/56   21 116/88        Pulse Readings from Last 3 Encounters:   21 80   21 87   21 94        Vitals:    21 0700   PainSc:   0 - No pain          Learning Assessment:   :       Learning Assessment 3/8/2021   PRIMARY LEARNER Patient   HIGHEST LEVEL OF EDUCATION - PRIMARY LEARNER  SOME COLLEGE   BARRIERS PRIMARY LEARNER NONE   CO-LEARNER CAREGIVER No   PRIMARY LANGUAGE ENGLISH   LEARNER PREFERENCE PRIMARY DEMONSTRATION   ANSWERED BY patient   RELATIONSHIP SELF        Depression Screening:   :       3 most recent PHQ Screens 2021   PHQ Not Done -   Little interest or pleasure in doing things Several days   Feeling down, depressed, irritable, or hopeless Nearly every day   Total Score PHQ 2 4   Trouble falling or staying asleep, or sleeping too much Nearly every day   Feeling tired or having little energy Nearly every day   Poor appetite, weight loss, or overeating More than half the days   Feeling bad about yourself - or that you are a failure or have let yourself or your family down Several days   Trouble concentrating on things such as school, work, reading, or watching TV Nearly every day   Moving or speaking so slowly that other people could have noticed; or the opposite being so fidgety that others notice Several days   Thoughts of being better off dead, or hurting yourself in some way Not at all   PHQ 9 Score 17        Fall Risk Assessment:   :     No flowsheet data found. Abuse Screening:   :     No flowsheet data found.      ADL Screening:   :       ADL Assessment 3/8/2021   Feeding yourself No Help Needed   Getting from bed to chair No Help Needed   Getting dressed No Help Needed   Bathing or showering No Help Needed   Walk across the room (includes cane/walker) No Help Needed   Using the telphone No Help Needed   Taking your medications No Help Needed   Preparing meals No Help Needed   Managing money (expenses/bills) No Help Needed   Moderately strenuous housework (laundry) No Help Needed   Shopping for personal items (toiletries/medicines) No Help Needed   Shopping for groceries No Help Needed   Driving No Help Needed   Climbing a flight of stairs No Help Needed   Getting to places beyond walking distances No Help Needed

## 2021-12-23 NOTE — PROGRESS NOTES
Ondina Ramos is a 32 y.o. female who was seen by synchronous (real-time) audio-video technology on 12/23/2021 for Sleep Problem        Assessment & Plan:   Diagnoses and all orders for this visit:    1. Acquired hypothyroidism  Continue low dose synthroid, reports recheck TSH was normal at [facility], looking to get records   2. Primary insomnia  -     gabapentin (NEURONTIN) 400 mg capsule; Take 2 capsules at night for insomnia  Continue current management  3. Gastroesophageal reflux disease without esophagitis    4. Hepatic steatosis    5. Bipolar 2 disorder (HCC)  -     sertraline (ZOLOFT) 50 mg tablet; Take 1 Tablet by mouth daily. Continue nightly seroquel and gabapentin for sleep. Resend psych referral  6. Plantar fasciitis  -     REFERRAL TO PODIATRY  Will refer to podiatry for further tx  7. Morbid obesity with BMI of 50.0-59.9, adult (HCC)  -     semaglutide, weight loss, (Wegovy) 0.25 mg/0.5 mL pnij; 0.25 mg every seven (7) days for 28 days, THEN 0.5 mg every seven (7) days for 28 days, THEN 1 mg every seven (7) days for 28 days. Change from [address] and will try Barb Mediate weekly. Had severe nausea daily with Saxenda daily injections. 8. Anxiety  Increase Sertraline to 50 mg daily    The complexity of medical decision making for this visit is moderate     I spent at least 30 minutes on this visit with this established patient. 712  Subjective:     Patient presents to follow up on insomnia/bipolar, obesity and hypothyroid. She is sleeping much better with the addition of 50 mg seroquel. She continues to speak with her psychologist. She has had some increase in anxiety. She had a holiday party and had to leave due to increased anxiety. She has also had some anhedonia during the day, but reports improved sleep. She is now using 400 mg gabapentin and seroquel nightly. She reports [address] was giving her daily nausea. Wishes to try something else. Had thyroid rechecked by [facility].  No increase in synthroid. Reports her plantar fasciitis is worse. Needs to see podiatrist. Had injections from orthopedics, did not seem to help. Prior to Admission medications    Medication Sig Start Date End Date Taking? Authorizing Provider   gabapentin (NEURONTIN) 400 mg capsule Take 2 capsules at night for insomnia 12/23/21  Yes John Arauz PA-C   sertraline (ZOLOFT) 50 mg tablet Take 1 Tablet by mouth daily. 12/23/21  Yes John Arauz PA-C   semaglutide, weight loss, [facility]) 0.25 mg/0.5 mL pnij 0.25 mg every seven (7) days for 28 days, THEN 0.5 mg every seven (7) days for 28 days, THEN 1 mg every seven (7) days for 28 days. 12/23/21 3/17/22 Yes John Arauz PA-C   QUEtiapine (SEROquel) 50 mg tablet TAKE 1 TABLET BY MOUTH EVERY NIGHT 10/21/21  Yes John Arauz PA-C   famotidine (PEPCID) 40 mg tablet Take 40 mg by mouth daily. 7/29/21  Yes Provider, Historical   ondansetron (Zofran ODT) 4 mg disintegrating tablet Take 1 Tablet by mouth every eight (8) hours as needed for Nausea. 8/5/21  Yes John Arauz PA-C   omeprazole (PRILOSEC) 40 mg capsule TAKE 1 CAPSULE BY MOUTH DAILY 6/28/21  Yes John Arauz PA-C   dicyclomine (BENTYL) 20 mg tablet Take 20 mg by mouth every six (6) hours as needed. 4/7/21  Yes Provider, Historical   levothyroxine (SYNTHROID) 25 mcg tablet Take 1 Tablet by mouth Daily (before breakfast). 6/23/21  Yes John Arauz PA-C   Insulin Needles, Disposable, 32 gauge x 5/32\" ndle Use to inject Liraglutide once daily using pen 6/23/21  Yes John Arauz PA-C   liraglutide, weight loss, (Saxenda) 3 mg/0.5 mL (18 mg/3 mL) pen ADMINISTER 0.6 MG UNDER THE SKIN DAILY FOR 1 WEEK. INCREASE DOSE BY 0.6MG EVERY WEEK AS TOLERATED UNTIL YOU REACH 3MG INJECTION PER DAY 9/8/21 12/23/21  John Arauz PA-C   sertraline (ZOLOFT) 25 mg tablet Take 1 Tablet by mouth daily.  8/25/21 12/23/21  John Arauz PA-C   gabapentin (NEURONTIN) 400 mg capsule Take 2 capsules at night for insomnia 8/25/21 12/23/21  Severiano Charity, PA-C     Patient Active Problem List   Diagnosis Code    Pregnant Z34.90    Hepatic steatosis K76.0    Acquired hypothyroidism E03.9    Plantar fasciitis M72.2    Gastroesophageal reflux disease without esophagitis K21.9    Overactive bladder N32.81    Primary insomnia F51.01    Bipolar 2 disorder ([facility] F31.81     Patient Active Problem List    Diagnosis Date Noted    Bipolar 2 disorder ([facility] 12/23/2021    Hepatic steatosis 03/08/2021    Acquired hypothyroidism 03/08/2021    Plantar fasciitis 03/08/2021    Gastroesophageal reflux disease without esophagitis 03/08/2021    Overactive bladder 03/08/2021    Primary insomnia 03/08/2021    Pregnant 07/16/2014     Current Outpatient Medications   Medication Sig Dispense Refill    gabapentin (NEURONTIN) 400 mg capsule Take 2 capsules at night for insomnia 60 Capsule 2    sertraline (ZOLOFT) 50 mg tablet Take 1 Tablet by mouth daily. 90 Tablet 1    semaglutide, weight loss, (Wegovy) 0.25 mg/0.5 mL pnij 0.25 mg every seven (7) days for 28 days, THEN 0.5 mg every seven (7) days for 28 days, THEN 1 mg every seven (7) days for 28 days. 4 Each 2    QUEtiapine (SEROquel) 50 mg tablet TAKE 1 TABLET BY MOUTH EVERY NIGHT 30 Tablet 2    famotidine (PEPCID) 40 mg tablet Take 40 mg by mouth daily.  ondansetron (Zofran ODT) 4 mg disintegrating tablet Take 1 Tablet by mouth every eight (8) hours as needed for Nausea. 10 Tablet 0    omeprazole (PRILOSEC) 40 mg capsule TAKE 1 CAPSULE BY MOUTH DAILY 90 Capsule 3    dicyclomine (BENTYL) 20 mg tablet Take 20 mg by mouth every six (6) hours as needed.  levothyroxine (SYNTHROID) 25 mcg tablet Take 1 Tablet by mouth Daily (before breakfast).  90 Tablet 3    Insulin Needles, Disposable, 32 gauge x 5/32\" ndle Use to inject Liraglutide once daily using pen 90 Pen Needle 3     No Known Allergies  Past Medical History:   Diagnosis Date    Acquired hypothyroidism 3/8/2021    Hearing disorder     Hepatic steatosis 3/8/2021    Psychiatric disorder     anxiety and depression since age 12    Psychiatric problem     depression     Past Surgical History:   Procedure Laterality Date    HX GYN      c/section x1    HX ORTHOPAEDIC      left foot fracture    HX OTHER SURGICAL      mouth surgery    HX OTHER SURGICAL      wisdom teeth 2013    HX OTHER SURGICAL      myrengotomy 2010     Family History   Problem Relation Age of Onset    Stroke Mother     Hypertension Father     Cancer Maternal Aunt         ovarian, breast    Hypertension Maternal Aunt     No Known Problems Maternal Uncle     Cancer Paternal Aunt         breast    Thyroid Disease Paternal Aunt     Cancer Paternal Uncle         benign brain tumor, lung    Hypertension Paternal Uncle     Depression Paternal Uncle     Suicide Paternal Uncle      Social History     Tobacco Use    Smoking status: Former Smoker     Quit date: 2014     Years since quittin.6    Smokeless tobacco: Former User    Tobacco comment: gum   Substance Use Topics    Alcohol use: Yes     Comment: rare       Review of Systems   Constitutional: Negative for chills, fever, malaise/fatigue and weight loss. HENT: Negative for ear pain, hearing loss and sore throat. Respiratory: Negative for cough and shortness of breath. Cardiovascular: Negative for chest pain and leg swelling. Gastrointestinal: Negative for abdominal pain, blood in stool, constipation, diarrhea, heartburn, melena, nausea and vomiting. Genitourinary: Negative for dysuria and hematuria. Musculoskeletal: Positive for myalgias. Negative for back pain. Skin: Negative for rash. Neurological: Negative for weakness and headaches. Psychiatric/Behavioral: Positive for depression. The patient is nervous/anxious. The patient does not have insomnia.         Objective:     Patient-Reported Vitals 2021   Patient-Reported Weight 305 Patient-Reported Temperature 98.6   Patient-Reported LMP 3 years ago      General: alert, cooperative, no distress   Mental  status: normal mood, behavior, speech, dress, motor activity, and thought processes, able to follow commands   HENT: NCAT   Neck: no visualized mass   Resp: no respiratory distress   Neuro: no gross deficits   Skin: no discoloration or lesions of concern on visible areas   Psychiatric: normal affect, consistent with stated mood, no evidence of hallucinations     Additional exam findings:   Well appearing in car     We discussed the expected course, resolution and complications of the diagnosis(es) in detail. Medication risks, benefits, costs, interactions, and alternatives were discussed as indicated. I advised her to contact the office if her condition worsens, changes or fails to improve as anticipated. She expressed understanding with the diagnosis(es) and plan. Yeison Dan was evaluated through a synchronous (real-time) audio-video encounter. The patient (or guardian if applicable) is aware that this is a billable service. Verbal consent to proceed has been obtained within the past 12 months. The visit was conducted pursuant to the emergency declaration under the [address] [place] [address], [address] [place] [address] authority and the [employer] and "[employer]ar General Act. Patient identification was verified, and a caregiver was present when appropriate. The patient was located in a state where the provider was credentialed to provide care.     Pricilla Cruz PA-C

## 2021-12-27 ENCOUNTER — TELEPHONE (OUTPATIENT)
Dept: INTERNAL MEDICINE CLINIC | Age: 26
End: 2021-12-27

## 2021-12-27 NOTE — TELEPHONE ENCOUNTER
----- Message from Celestina Francis sent at 12/23/2021  1:15 PM EST -----  Subject: Medication Problem    QUESTIONS  Name of Medication? semaglutide, weight loss, (Wegovy) 0.25 mg/0.5 mL pnij  Patient-reported dosage and instructions? 0.25 mg /0.5 mL pnij  What question or problem do you have with the medication? The problem with   this medication is that it is not compatible with her insurance [facility]). Please call back whenever available. Preferred Pharmacy? [facility] #[id number] [place], [address] phone number (if available)? [phone]  Additional Information for Provider?   ---------------------------------------------------------------------------  --------------  CALL BACK INFO  What is the best way for the office to contact you? OK to leave message on   voicemail  Preferred Call Back Phone Number? [phone]  ---------------------------------------------------------------------------  --------------  SCRIPT ANSWERS  Relationship to Patient?  Self

## 2022-01-17 ENCOUNTER — TELEPHONE (OUTPATIENT)
Dept: INTERNAL MEDICINE CLINIC | Age: 27
End: 2022-01-17

## 2022-01-17 NOTE — TELEPHONE ENCOUNTER
I called the patient and verified them by name and date of birth. She never got the prescription for Wegovy due to insurance denying coverage. I gave her the options to either: I can redo the PA or she can pay out of pocket. She has had this problem before with the [place]. I informed her I would try the PA again and let her know. She stated understanding and if it does not work then she will call insurance.

## 2022-02-01 NOTE — TELEPHONE ENCOUNTER
The PA for [facility] has been approved. I called the patient and verified them by name and date of birth. I informed her on the approval. She stated understanding and had no further questions.

## 2022-03-15 ENCOUNTER — TELEPHONE (OUTPATIENT)
Dept: INTERNAL MEDICINE CLINIC | Age: 27
End: 2022-03-15

## 2022-03-15 DIAGNOSIS — F31.81 BIPOLAR 2 DISORDER (HCC): ICD-10-CM

## 2022-03-15 DIAGNOSIS — F51.01 PRIMARY INSOMNIA: ICD-10-CM

## 2022-03-15 NOTE — TELEPHONE ENCOUNTER
Patient called stating the pharmacy says insurance requires prior authorization on Wegovy 0.25  Need refill on Seroquel- she says pharmacy put in request last week

## 2022-03-16 RX ORDER — QUETIAPINE FUMARATE 50 MG/1
50 TABLET, FILM COATED ORAL
Qty: 30 TABLET | Refills: 2 | Status: SHIPPED | OUTPATIENT
Start: 2022-03-16 | End: 2022-09-08 | Stop reason: SDUPTHER

## 2022-03-16 NOTE — TELEPHONE ENCOUNTER
A PA for Kayla Lise was sent in and approved on 02.2022. I called the pharmacy and it is saying her max is 2 in 365 days. It is requiring a PA. I am unable to complete the PA via cover my meds and they are currently closed.

## 2022-03-16 NOTE — TELEPHONE ENCOUNTER
PCP: Pranay Floyd PA-C     Last appt: 12/23/2021   No future appointments. Requested Prescriptions     Pending Prescriptions Disp Refills    QUEtiapine (SEROquel) 50 mg tablet 30 Tablet 2     Sig: Take 1 Tablet by mouth nightly.

## 2022-03-18 PROBLEM — F41.9 ANXIETY: Status: ACTIVE | Noted: 2021-12-23

## 2022-03-18 PROBLEM — M72.2 PLANTAR FASCIITIS: Status: ACTIVE | Noted: 2021-03-08

## 2022-03-18 PROBLEM — E66.01 MORBID OBESITY WITH BMI OF 50.0-59.9, ADULT (HCC): Status: ACTIVE | Noted: 2021-12-23

## 2022-03-18 PROBLEM — K76.0 HEPATIC STEATOSIS: Status: ACTIVE | Noted: 2021-03-08

## 2022-03-19 PROBLEM — E03.9 ACQUIRED HYPOTHYROIDISM: Status: ACTIVE | Noted: 2021-03-08

## 2022-03-19 PROBLEM — N32.81 OVERACTIVE BLADDER: Status: ACTIVE | Noted: 2021-03-08

## 2022-03-19 PROBLEM — F31.81 BIPOLAR 2 DISORDER (HCC): Status: ACTIVE | Noted: 2021-12-23

## 2022-03-20 PROBLEM — F51.01 PRIMARY INSOMNIA: Status: ACTIVE | Noted: 2021-03-08

## 2022-03-20 PROBLEM — K21.9 GASTROESOPHAGEAL REFLUX DISEASE WITHOUT ESOPHAGITIS: Status: ACTIVE | Noted: 2021-03-08

## 2022-03-24 NOTE — TELEPHONE ENCOUNTER
Angela with covermymeds called [phone] ref number [id number].  They needed to speak to whoever does the PA's for the wegovy rx

## 2022-06-29 DIAGNOSIS — E03.9 ACQUIRED HYPOTHYROIDISM: ICD-10-CM

## 2022-06-29 RX ORDER — LEVOTHYROXINE SODIUM 25 UG/1
TABLET ORAL
Qty: 90 TABLET | Refills: 3 | Status: SHIPPED | OUTPATIENT
Start: 2022-06-29

## 2022-07-27 ENCOUNTER — TELEPHONE (OUTPATIENT)
Dept: INTERNAL MEDICINE CLINIC | Age: 27
End: 2022-07-27

## 2022-07-27 NOTE — TELEPHONE ENCOUNTER
Thank you. Let her know that medicine has been on back order a lot. She may have to wait a few weeks until its back in stock.  Non-essential medicine for weight loss

## 2022-07-27 NOTE — TELEPHONE ENCOUNTER
Pharmacy comments: wegovy 0.25/ 0.5ML PFF pen inj strength on backorder until end of October.  We will not be able to dispense

## 2022-09-08 DIAGNOSIS — F51.01 PRIMARY INSOMNIA: ICD-10-CM

## 2022-09-08 DIAGNOSIS — F31.81 BIPOLAR 2 DISORDER (HCC): ICD-10-CM

## 2022-09-08 RX ORDER — PEN NEEDLE, DIABETIC 32GX 5/32"
NEEDLE, DISPOSABLE MISCELLANEOUS
Qty: 100 PEN NEEDLE | Refills: 1 | Status: SHIPPED | OUTPATIENT
Start: 2022-09-08

## 2022-09-08 RX ORDER — QUETIAPINE FUMARATE 50 MG/1
50 TABLET, FILM COATED ORAL
Qty: 30 TABLET | Refills: 2 | Status: SHIPPED | OUTPATIENT
Start: 2022-09-08

## 2022-09-08 NOTE — TELEPHONE ENCOUNTER
Pt needs refill of seroquel, pharmacy has not been able to get wegovy for last 3 months. Informed she would need to discuss with Scott AVENDAÑO at next visit. PCP: Aparna Burch PA-C     Last appt: 12/23/2021     Future Appointments   Date Time Provider Caty Tavarezi   11/14/2022  3:30 PM Aparna Burch PA-C [facility]          Requested Prescriptions     Pending Prescriptions Disp Refills    QUEtiapine (SEROquel) 50 mg tablet 30 Tablet 2     Sig: Take 1 Tablet by mouth nightly.

## 2022-09-08 NOTE — TELEPHONE ENCOUNTER
----- Message from Princess José Miguelsreedhar sent at 9/8/2022  9:06 AM EDT -----  Subject: Refill Request    QUESTIONS  Name of Medication? QUEtiapine (SEROquel) 50 mg tablet  Patient-reported dosage and instructions? 50mg  How many days do you have left? 0  [facility]? [id number]  Pharmacy phone number (if available)? [phone]  Additional Information for Provider? Rob is out of medication,   patient was wondering if she could be prescribed something else, she has   an upcoming appointment on 11/14  ---------------------------------------------------------------------------  --------------  [address]  What is the best way for the office to contact you? OK to leave message on   voicemail  Preferred Call Back Phone Number? [phone]  ---------------------------------------------------------------------------  --------------  SCRIPT ANSWERS  Relationship to Patient?  Self

## 2022-12-07 ENCOUNTER — VIRTUAL VISIT (OUTPATIENT)
Dept: INTERNAL MEDICINE CLINIC | Age: 27
End: 2022-12-07
Payer: MEDICAID

## 2022-12-07 DIAGNOSIS — F41.9 ANXIETY: ICD-10-CM

## 2022-12-07 DIAGNOSIS — E66.01 MORBID (SEVERE) OBESITY DUE TO EXCESS CALORIES (HCC): ICD-10-CM

## 2022-12-07 DIAGNOSIS — F31.81 BIPOLAR 2 DISORDER (HCC): ICD-10-CM

## 2022-12-07 DIAGNOSIS — F51.01 PRIMARY INSOMNIA: ICD-10-CM

## 2022-12-07 DIAGNOSIS — J02.0 STREP THROAT: ICD-10-CM

## 2022-12-07 DIAGNOSIS — E03.9 ACQUIRED HYPOTHYROIDISM: Primary | ICD-10-CM

## 2022-12-07 PROCEDURE — 99214 OFFICE O/P EST MOD 30 MIN: CPT | Performed by: PHYSICIAN ASSISTANT

## 2022-12-07 RX ORDER — QUETIAPINE FUMARATE 50 MG/1
50 TABLET, FILM COATED ORAL
Qty: 90 TABLET | Refills: 3 | Status: SHIPPED | OUTPATIENT
Start: 2022-12-07

## 2022-12-07 RX ORDER — SERTRALINE HYDROCHLORIDE 50 MG/1
50 TABLET, FILM COATED ORAL DAILY
Qty: 90 TABLET | Refills: 3 | Status: SHIPPED | OUTPATIENT
Start: 2022-12-07

## 2022-12-07 RX ORDER — TIRZEPATIDE 2.5 MG/.5ML
2.5 INJECTION, SOLUTION SUBCUTANEOUS
Qty: 4 ADJUSTABLE DOSE PRE-FILLED PEN SYRINGE | Refills: 0 | Status: SHIPPED | OUTPATIENT
Start: 2022-12-07

## 2022-12-07 RX ORDER — AMOXICILLIN 500 MG/1
500 CAPSULE ORAL 2 TIMES DAILY
Qty: 14 CAPSULE | Refills: 0 | Status: SHIPPED | OUTPATIENT
Start: 2022-12-07

## 2022-12-07 NOTE — PROGRESS NOTES
Alee Gonzalez is a 32 y.o. female who was seen by synchronous (real-time) audio-video technology on 12/7/2022 for Medication Evaluation (Pain - 6 (throat) )        Assessment & Plan:   Diagnoses and all orders for this visit:    1. Acquired hypothyroidism  -     THYROID CASCADE PROFILE; Future  -     METABOLIC PANEL, COMPREHENSIVE; Future    2. Primary insomnia  -     QUEtiapine (SEROquel) 50 mg tablet; Take 1 Tablet by mouth nightly. 3. Bipolar 2 disorder (HCC)  -     sertraline (ZOLOFT) 50 mg tablet; Take 1 Tablet by mouth daily.  -     QUEtiapine (SEROquel) 50 mg tablet; Take 1 Tablet by mouth nightly. 4. Anxiety    5. Morbid (severe) obesity due to excess calories (HCC)  -     METABOLIC PANEL, COMPREHENSIVE; Future  -     CBC WITH AUTOMATED DIFF; Future  -     LIPID PANEL; Future  -     HEMOGLOBIN A1C WITH EAG; Future  -     tirzepatide (Mounjaro) 2.5 mg/0.5 mL pnij; 2.5 mg by SubCUTAneous route every seven (7) days. 6. Strep throat  -     amoxicillin (AMOXIL) 500 mg capsule; Take 1 Capsule by mouth two (2) times a day. Sleep is well controlled. Will send to [facility] for [facility] eval for bipolar 2 eval and new psychologist. Consider [facility] if needed. Labs ordered for thyroid and other screenings. Begin mounjaro for weight loss with monthly increases, if insurance doesn't cover will send coupon for her to try for 25$ monthly pay  Continue sertraline 50 mg for now but adjust as recommended by psych. Issues with agitation may be more stress related than uncontrolled bipolar. Depression seems well controlled. The complexity of medical decision making for this visit is moderate     I spent at least 25 minutes on this visit with this established patient. 712  Subjective:   Patient evaluated on virtual visit [facility]. Hx of bipolar 2. Following with psychologist, now looking for new one as her previous psychologist left.  No current tx for bipolar, has been using sertraline 50 mg for depression. No active depression but notes she has been more easily agitated with kids at home which she does not love. Seroquel working well for sleep and uses gabapentin 400 mg at night to sleep as well. Was not able to continue wegovy in spring due to supply issues. Interested in other options. Has cut out most soda but still drinking lemonade. All her kids have + strep throat currently. Has 2 days of sore throat now as well. Prior to Admission medications    Medication Sig Start Date End Date Taking? Authorizing Provider   sertraline (ZOLOFT) 50 mg tablet Take 1 Tablet by mouth daily. 12/7/22  Yes Pranay Floyd PA-C   QUEtiapine (SEROquel) 50 mg tablet Take 1 Tablet by mouth nightly. 12/7/22  Yes Pranay Floyd PA-C   tirzepatide [facility]) 2.5 mg/0.5 mL pnij 2.5 mg by SubCUTAneous route every seven (7) days. 12/7/22  Yes Pranay Floyd PA-C   BD Kinga 2nd Gen Pen Needle 32 gauge x 5/32\" ndle USE TO INJECT LIRAGLUTIDE ONCE DAILY USING PEN 9/8/22  Yes Miguel Reese MD   omeprazole (PRILOSEC) 40 mg capsule TAKE 1 CAPSULE BY MOUTH DAILY 8/13/22  Yes Miguel Reese MD   gabapentin (NEURONTIN) 400 mg capsule Take 2 capsules at night for insomnia 8/9/22  Yes Miguel Reese MD   sertraline (ZOLOFT) 50 mg tablet Take 1 Tablet by mouth in the morning. 8/9/22 12/7/22 Yes Michelle Booth MD   levothyroxine (SYNTHROID) 25 mcg tablet TAKE 1 TABLET BY MOUTH DAILY BEFORE BREAKFAST 6/29/22  Yes Pranay Floyd PA-C   famotidine (PEPCID) 40 mg tablet Take 40 mg by mouth daily. 7/29/21  Yes Provider, Historical   ondansetron (Zofran ODT) 4 mg disintegrating tablet Take 1 Tablet by mouth every eight (8) hours as needed for Nausea. 8/5/21  Yes Pranay Floyd PA-C   dicyclomine (BENTYL) 20 mg tablet Take 20 mg by mouth every six (6) hours as needed. 4/7/21  Yes Provider, Historical   QUEtiapine (SEROquel) 50 mg tablet Take 1 Tablet by mouth nightly.  9/8/22 12/7/22  Casandra Guzman MD IFTIKHAR     Patient Active Problem List   Diagnosis Code    Pregnant Z34.90    Hepatic steatosis K76.0    Acquired hypothyroidism E03.9    Plantar fasciitis M72.2    Gastroesophageal reflux disease without esophagitis K21.9    Overactive bladder N32.81    Primary insomnia F51.01    Bipolar 2 disorder ([facility] F31.81    Anxiety F41.9    Morbid obesity with BMI of 50.0-59.9, adult ([facility] E66.01, Z68.43     Patient Active Problem List    Diagnosis Date Noted    Bipolar 2 disorder ([facility] 12/23/2021    Anxiety 12/23/2021    Morbid obesity with BMI of 50.0-59.9, adult ([facility] 12/23/2021    Hepatic steatosis 03/08/2021    Acquired hypothyroidism 03/08/2021    Plantar fasciitis 03/08/2021    Gastroesophageal reflux disease without esophagitis 03/08/2021    Overactive bladder 03/08/2021    Primary insomnia 03/08/2021    Pregnant 07/16/2014     Current Outpatient Medications   Medication Sig Dispense Refill    sertraline (ZOLOFT) 50 mg tablet Take 1 Tablet by mouth daily. 90 Tablet 3    QUEtiapine (SEROquel) 50 mg tablet Take 1 Tablet by mouth nightly. 90 Tablet 3    tirzepatide (Mounjaro) 2.5 mg/0.5 mL pnij 2.5 mg by SubCUTAneous route every seven (7) days. 4 Adjustable Dose Pre-filled Pen Syringe 0    amoxicillin (AMOXIL) 500 mg capsule Take 1 Capsule by mouth two (2) times a day. 14 Capsule 0    BD Kinga 2nd Gen Pen Needle 32 gauge x 5/32\" ndle USE TO INJECT LIRAGLUTIDE ONCE DAILY USING  Pen Needle 1    omeprazole (PRILOSEC) 40 mg capsule TAKE 1 CAPSULE BY MOUTH DAILY 90 Capsule 0    gabapentin (NEURONTIN) 400 mg capsule Take 2 capsules at night for insomnia 60 Capsule 1    levothyroxine (SYNTHROID) 25 mcg tablet TAKE 1 TABLET BY MOUTH DAILY BEFORE BREAKFAST 90 Tablet 3    famotidine (PEPCID) 40 mg tablet Take 40 mg by mouth daily. ondansetron (Zofran ODT) 4 mg disintegrating tablet Take 1 Tablet by mouth every eight (8) hours as needed for Nausea.  10 Tablet 0    dicyclomine (BENTYL) 20 mg tablet Take 20 mg by mouth every six (6) hours as needed. No Known Allergies  Past Medical History:   Diagnosis Date    Acquired hypothyroidism 3/8/2021    Hearing disorder     Hepatic steatosis 3/8/2021    Psychiatric disorder     anxiety and depression since age 12    Psychiatric problem     depression     Past Surgical History:   Procedure Laterality Date    HX GYN      c/section x1    HX ORTHOPAEDIC      left foot fracture    HX OTHER SURGICAL      mouth surgery    HX OTHER SURGICAL      wisdom teeth 2013    HX OTHER SURGICAL      myrengotomy 2010     Family History   Problem Relation Age of Onset    Stroke Mother     Hypertension Father     Cancer Maternal Aunt         ovarian, breast    Hypertension Maternal Aunt     No Known Problems Maternal Uncle     Cancer Paternal Aunt         breast    Thyroid Disease Paternal Aunt     Cancer Paternal Uncle         benign brain tumor, lung    Hypertension Paternal Uncle     Depression Paternal Uncle     Suicide Paternal Uncle      Social History     Tobacco Use    Smoking status: Former     Types: Cigarettes     Quit date: 2014     Years since quittin.5    Smokeless tobacco: Former    Tobacco comments:     gum   Substance Use Topics    Alcohol use: Yes     Comment: rare       Review of Systems   Constitutional:  Negative for chills, fever, malaise/fatigue and weight loss. HENT:  Positive for sore throat. Negative for ear pain and hearing loss. Respiratory:  Negative for cough and shortness of breath. Cardiovascular:  Negative for chest pain and leg swelling. Gastrointestinal:  Negative for abdominal pain, blood in stool, constipation, diarrhea, heartburn, melena, nausea and vomiting. Genitourinary:  Negative for dysuria and hematuria. Musculoskeletal:  Negative for back pain and myalgias. Skin:  Negative for rash. Neurological:  Negative for weakness and headaches. Psychiatric/Behavioral:  Negative for depression.       Objective:     Patient-Reported Vitals 12/7/2022   Patient-Reported Weight 310lb   Patient-Reported Temperature -   Patient-Reported LMP -      General: alert, cooperative, no distress   Mental  status: normal mood, behavior, speech, dress, motor activity, and thought processes, able to follow commands   HENT: NCAT   Neck: no visualized mass   Resp: no respiratory distress   Neuro: no gross deficits   Skin: no discoloration or lesions of concern on visible areas   Psychiatric: normal affect, consistent with stated mood, no evidence of hallucinations     Additional exam findings: We discussed the expected course, resolution and complications of the diagnosis(es) in detail. Medication risks, benefits, costs, interactions, and alternatives were discussed as indicated. I advised her to contact the office if her condition worsens, changes or fails to improve as anticipated. She expressed understanding with the diagnosis(es) and plan. Nasim Wes, was evaluated through a synchronous (real-time) audio-video encounter. The patient (or guardian if applicable) is aware that this is a billable service, which includes applicable co-pays. This Virtual Visit was conducted with patient's (and/or legal guardian's) consent. The visit was conducted pursuant to the emergency declaration under the [facility], [address] [place] [address] [facility] authority and the [employer] General Act. Patient identification was verified, and a caregiver was present when appropriate. The patient was located at: Home: [place] [id number]  The provider was located at:  Facility (Appt Department): [address] [address]        Carlos Vivar PA-C

## 2022-12-07 NOTE — PROGRESS NOTES
Italo Craft Gordon  Identified pt with two pt identifiers(name and ). Chief Complaint   Patient presents with    Medication Evaluation     Pain - 6 (throat)        Reviewed record In preparation for visit and have obtained necessary documentation. 1. Have you been to the ER, urgent care clinic or hospitalized since your last visit? No     2. Have you seen or consulted any other health care providers outside of the [address] [place] [address] since your last visit? Include any pap smears or colon screening. No    Patient has an advance directive. Vitals reviewed with provider. Health Maintenance reviewed:     Health Maintenance Due   Topic    Hepatitis C Screening     Pap Smear     COVID-19 Vaccine (3 - Booster for Pfizer series)    Flu Vaccine (1)          Wt Readings from Last 3 Encounters:   21 306 lb 7 oz (139 kg)   21 313 lb (142 kg)   21 306 lb (138.8 kg)        Temp Readings from Last 3 Encounters:   21 97.9 °F (36.6 °C)   21 99.3 °F (37.4 °C) (Oral)   21 97.8 °F (36.6 °C)        BP Readings from Last 3 Encounters:   21 (!) 136/53   21 (!) 91/56   21 116/88        Pulse Readings from Last 3 Encounters:   21 80   21 87   21 94      There were no vitals filed for this visit.        Learning Assessment:   :       Learning Assessment 3/8/2021   PRIMARY LEARNER Patient   HIGHEST LEVEL OF EDUCATION - PRIMARY LEARNER  SOME COLLEGE   BARRIERS PRIMARY LEARNER NONE   CO-LEARNER CAREGIVER No   PRIMARY LANGUAGE ENGLISH   LEARNER PREFERENCE PRIMARY DEMONSTRATION   ANSWERED BY patient   RELATIONSHIP SELF        Depression Screening:   :       3 most recent PHQ Screens 2021   PHQ Not Done -   Little interest or pleasure in doing things Several days   Feeling down, depressed, irritable, or hopeless Nearly every day   Total Score PHQ 2 4   Trouble falling or staying asleep, or sleeping too much Nearly every day   Feeling tired or having little energy Nearly every day   Poor appetite, weight loss, or overeating More than half the days   Feeling bad about yourself - or that you are a failure or have let yourself or your family down Several days   Trouble concentrating on things such as school, work, reading, or watching TV Nearly every day   Moving or speaking so slowly that other people could have noticed; or the opposite being so fidgety that others notice Several days   Thoughts of being better off dead, or hurting yourself in some way Not at all   PHQ 9 Score 17        Fall Risk Assessment:   :     No flowsheet data found. Abuse Screening:   :     No flowsheet data found.      ADL Screening:   :       ADL Assessment 3/8/2021   Feeding yourself No Help Needed   Getting from bed to chair No Help Needed   Getting dressed No Help Needed   Bathing or showering No Help Needed   Walk across the room (includes cane/walker) No Help Needed   Using the telphone No Help Needed   Taking your medications No Help Needed   Preparing meals No Help Needed   Managing money (expenses/bills) No Help Needed   Moderately strenuous housework (laundry) No Help Needed   Shopping for personal items (toiletries/medicines) No Help Needed   Shopping for groceries No Help Needed   Driving No Help Needed   Climbing a flight of stairs No Help Needed   Getting to places beyond walking distances No Help Needed

## 2023-01-06 ENCOUNTER — VIRTUAL VISIT (OUTPATIENT)
Dept: INTERNAL MEDICINE CLINIC | Age: 28
End: 2023-01-06
Payer: MEDICAID

## 2023-01-06 DIAGNOSIS — R59.1 LYMPHADENOPATHY: Primary | ICD-10-CM

## 2023-01-06 PROCEDURE — 99213 OFFICE O/P EST LOW 20 MIN: CPT | Performed by: PHYSICIAN ASSISTANT

## 2023-01-06 NOTE — PROGRESS NOTES
Pawan Mancia is a 32 y.o. female who was seen by synchronous (real-time) audio-video technology on 1/6/2023 for Gland Swelling (Pain - 0 )        Assessment & Plan:   Diagnoses and all orders for this visit:    1. Lymphadenopathy  -     US BREAST AXILLA RT; Future  -     US BREAST AXILLA LT; Future  -     US EXT NONVAS LT LTD; Future    Order US of areas to confirm lymph nodes versus abscess. Unable to assess over VV. Labs reprinted to get CBC check. Will consider biopsy through gen surgery if lymph nodes. Throughout different areas. Recommend Region  follow up. The complexity of medical decision making for this visit is moderate     I spent at least 15 minutes on this visit with this established patient. 712  Subjective:   Patient presents via [employer] virtual visit for gradual onset, worsening areas of firm hard swelling under BL underarms, L groin and near rectum. She has noticed a few of them under her BL arms and then the L groin and rectum one in the past few weeks. She notes she has chronic fatigue and morbid obesity. Was unable to get mounjaro coupon. Still working to get new psychologist through [employer] or [employer] due to insurance. Planning to try [place] [id number]. Prior to Admission medications    Medication Sig Start Date End Date Taking? Authorizing Provider   sertraline (ZOLOFT) 50 mg tablet Take 1 Tablet by mouth daily. 12/7/22  Yes Génesis Dupree PA-C   QUEtiapine (SEROquel) 50 mg tablet Take 1 Tablet by mouth nightly.  12/7/22  Yes Génesis Dupree PA-C   BD Kinga 2nd Gen Pen Needle 32 gauge x 5/32\" ndle USE TO INJECT LIRAGLUTIDE ONCE DAILY USING PEN 9/8/22  Yes Cristi Middleton MD   omeprazole (PRILOSEC) 40 mg capsule TAKE 1 CAPSULE BY MOUTH DAILY 8/13/22  Yes Cristi Middleton MD   gabapentin (NEURONTIN) 400 mg capsule Take 2 capsules at night for insomnia 8/9/22  Yes Cristi Middleton MD   levothyroxine (SYNTHROID) 25 mcg tablet TAKE 1 TABLET BY MOUTH DAILY BEFORE BREAKFAST 6/29/22  Yes Juan Saucedo PA-C   famotidine (PEPCID) 40 mg tablet Take 40 mg by mouth daily. 7/29/21  Yes Provider, Historical   ondansetron (Zofran ODT) 4 mg disintegrating tablet Take 1 Tablet by mouth every eight (8) hours as needed for Nausea. 8/5/21  Yes Juan Saucedo PA-C   dicyclomine (BENTYL) 20 mg tablet Take 20 mg by mouth every six (6) hours as needed. 4/7/21  Yes Provider, Historical   tirzepatide (Mounjaro) 2.5 mg/0.5 mL pnij 2.5 mg by SubCUTAneous route every seven (7) days. Patient not taking: Reported on 1/6/2023 12/7/22   Juan Saucedo PA-C   amoxicillin (AMOXIL) 500 mg capsule Take 1 Capsule by mouth two (2) times a day. Patient not taking: Reported on 1/6/2023 12/7/22   Juan Saucedo PA-C     Patient Active Problem List   Diagnosis Code    Pregnant Z34.90    Hepatic steatosis K76.0    Acquired hypothyroidism E03.9    Plantar fasciitis M72.2    Gastroesophageal reflux disease without esophagitis K21.9    Overactive bladder N32.81    Primary insomnia F51.01    Bipolar 2 disorder ([address] F31.81    Anxiety F41.9    Morbid obesity with BMI of 50.0-59.9, adult ([facility] [address] E66.01, Z68.43     Patient Active Problem List    Diagnosis Date Noted    Bipolar 2 disorder ([address] 12/23/2021    Anxiety 12/23/2021    Morbid obesity with BMI of 50.0-59.9, adult ([facility] [address] 12/23/2021    Hepatic steatosis 03/08/2021    Acquired hypothyroidism 03/08/2021    Plantar fasciitis 03/08/2021    Gastroesophageal reflux disease without esophagitis 03/08/2021    Overactive bladder 03/08/2021    Primary insomnia 03/08/2021    Pregnant 07/16/2014     Current Outpatient Medications   Medication Sig Dispense Refill    sertraline (ZOLOFT) 50 mg tablet Take 1 Tablet by mouth daily. 90 Tablet 3    QUEtiapine (SEROquel) 50 mg tablet Take 1 Tablet by mouth nightly.  90 Tablet 3    BD Kinga 2nd Gen Pen Needle 32 gauge x 5/32\" ndle USE TO INJECT LIRAGLUTIDE ONCE DAILY USING  Pen Needle 1    omeprazole (PRILOSEC) 40 mg capsule TAKE 1 CAPSULE BY MOUTH DAILY 90 Capsule 0    gabapentin (NEURONTIN) 400 mg capsule Take 2 capsules at night for insomnia 60 Capsule 1    levothyroxine (SYNTHROID) 25 mcg tablet TAKE 1 TABLET BY MOUTH DAILY BEFORE BREAKFAST 90 Tablet 3    famotidine (PEPCID) 40 mg tablet Take 40 mg by mouth daily. ondansetron (Zofran ODT) 4 mg disintegrating tablet Take 1 Tablet by mouth every eight (8) hours as needed for Nausea. 10 Tablet 0    dicyclomine (BENTYL) 20 mg tablet Take 20 mg by mouth every six (6) hours as needed. tirzepatide (Mounjaro) 2.5 mg/0.5 mL pnij 2.5 mg by SubCUTAneous route every seven (7) days. (Patient not taking: Reported on 2023) 4 Adjustable Dose Pre-filled Pen Syringe 0    amoxicillin (AMOXIL) 500 mg capsule Take 1 Capsule by mouth two (2) times a day.  (Patient not taking: Reported on 2023) 14 Capsule 0     No Known Allergies  Past Medical History:   Diagnosis Date    Acquired hypothyroidism 3/8/2021    Hearing disorder     Hepatic steatosis 3/8/2021    Psychiatric disorder     anxiety and depression since age 12    Psychiatric problem     depression     Past Surgical History:   Procedure Laterality Date    HX GYN      c/section x1    HX ORTHOPAEDIC      left foot fracture    HX OTHER SURGICAL      mouth surgery    HX OTHER SURGICAL      wisdom teeth     HX OTHER SURGICAL      myrengotomy 2010     Family History   Problem Relation Age of Onset    Stroke Mother     Hypertension Father     Cancer Maternal Aunt         ovarian, breast    Hypertension Maternal Aunt     No Known Problems Maternal Uncle     Cancer Paternal Aunt         breast    Thyroid Disease Paternal Aunt     Cancer Paternal Uncle         benign brain tumor, lung    Hypertension Paternal Uncle     Depression Paternal Uncle     Suicide Paternal Uncle      Social History     Tobacco Use    Smoking status: Former     Types: Cigarettes     Quit date: 2014     Years since quittin.6    Smokeless tobacco: Former    Tobacco comments:     gum   Substance Use Topics    Alcohol use: Yes     Comment: rare       Review of Systems   Constitutional:  Positive for malaise/fatigue. Negative for chills, fever and weight loss. HENT:  Negative for ear pain, hearing loss and sore throat. Respiratory:  Negative for cough and shortness of breath. Cardiovascular:  Negative for chest pain and leg swelling. Gastrointestinal:  Negative for abdominal pain, blood in stool, constipation, diarrhea, heartburn, melena, nausea and vomiting. Genitourinary:  Negative for dysuria and hematuria. Musculoskeletal:  Negative for back pain and myalgias. Skin:  Negative for rash. +lymph node enlargement   Neurological:  Negative for weakness and headaches. Psychiatric/Behavioral:  Negative for depression. Objective:     Patient-Reported Vitals 1/6/2023   Patient-Reported Weight 315   Patient-Reported Pulse 97   Patient-Reported Temperature 98.9   Patient-Reported SpO2 99%   Patient-Reported Systolic  696   Patient-Reported Diastolic 40   Patient-Reported LMP -      General: alert, cooperative, no distress   Mental  status: normal mood, behavior, speech, dress, motor activity, and thought processes, able to follow commands   HENT: NCAT   Neck: no visualized mass   Resp: no respiratory distress   Neuro: no gross deficits   Skin: no discoloration or lesions of concern on visible areas   Psychiatric: normal affect, consistent with stated mood, no evidence of hallucinations     Additional exam findings: We discussed the expected course, resolution and complications of the diagnosis(es) in detail. Medication risks, benefits, costs, interactions, and alternatives were discussed as indicated. I advised her to contact the office if her condition worsens, changes or fails to improve as anticipated. She expressed understanding with the diagnosis(es) and plan.      Adam Cardenas was evaluated through a synchronous (real-time) audio-video encounter. The patient (or guardian if applicable) is aware that this is a billable service, which includes applicable co-pays. This Virtual Visit was conducted with patient's (and/or legal guardian's) consent. The visit was conducted pursuant to the emergency declaration under the [address] [place] [address], [address] [place] [address] authority and the [employer] Act. Patient identification was verified, and a caregiver was present when appropriate.   The patient was located at: Home: Xiomara Stevens   The provider was located at: Home: [unfilled]        Gus Mcknight PA-C

## 2023-01-06 NOTE — PROGRESS NOTES
Arslan Garcia [place]  Identified pt with two pt identifiers(name and ). Chief Complaint   Patient presents with    Gland Swelling     Pain - 0        Reviewed record In preparation for visit and have obtained necessary documentation. 1. Have you been to the ER, urgent care clinic or hospitalized since your last visit? No     2. Have you seen or consulted any other health care providers outside of the [address] [place] [address] since your last visit? Include any pap smears or colon screening. No    Patient has an advance directive. Vitals reviewed with provider. Health Maintenance reviewed:     Health Maintenance Due   Topic    Hepatitis C Screening     Pap Smear           Wt Readings from Last 3 Encounters:   21 306 lb 7 oz (139 kg)   21 313 lb (142 kg)   21 306 lb (138.8 kg)        Temp Readings from Last 3 Encounters:   21 97.9 °F (36.6 °C)   21 99.3 °F (37.4 °C) (Oral)   21 97.8 °F (36.6 °C)        BP Readings from Last 3 Encounters:   21 (!) 136/53   21 (!) 91/56   21 116/88        Pulse Readings from Last 3 Encounters:   21 80   21 87   21 94      There were no vitals filed for this visit.        Learning Assessment:   :       Learning Assessment 3/8/2021   PRIMARY LEARNER Patient   HIGHEST LEVEL OF EDUCATION - PRIMARY LEARNER  SOME COLLEGE   BARRIERS PRIMARY LEARNER NONE   CO-LEARNER CAREGIVER No   PRIMARY LANGUAGE ENGLISH   LEARNER PREFERENCE PRIMARY DEMONSTRATION   ANSWERED BY patient   RELATIONSHIP SELF        Depression Screening:   :       3 most recent PHQ Screens 2023   PHQ Not Done -   Little interest or pleasure in doing things Nearly every day   Feeling down, depressed, irritable, or hopeless Nearly every day   Total Score PHQ 2 6   Trouble falling or staying asleep, or sleeping too much Nearly every day   Feeling tired or having little energy Nearly every day   Poor appetite, weight loss, or overeating More than half the days   Feeling bad about yourself - or that you are a failure or have let yourself or your family down Several days   Trouble concentrating on things such as school, work, reading, or watching TV Nearly every day   Moving or speaking so slowly that other people could have noticed; or the opposite being so fidgety that others notice Nearly every day   Thoughts of being better off dead, or hurting yourself in some way Not at all   PHQ 9 Score 21        Fall Risk Assessment:   :     No flowsheet data found. Abuse Screening:   :     No flowsheet data found.      ADL Screening:   :       ADL Assessment 3/8/2021   Feeding yourself No Help Needed   Getting from bed to chair No Help Needed   Getting dressed No Help Needed   Bathing or showering No Help Needed   Walk across the room (includes cane/walker) No Help Needed   Using the telphone No Help Needed   Taking your medications No Help Needed   Preparing meals No Help Needed   Managing money (expenses/bills) No Help Needed   Moderately strenuous housework (laundry) No Help Needed   Shopping for personal items (toiletries/medicines) No Help Needed   Shopping for groceries No Help Needed   Driving No Help Needed   Climbing a flight of stairs No Help Needed   Getting to places beyond walking distances No Help Needed

## 2023-01-17 ENCOUNTER — HOSPITAL ENCOUNTER (OUTPATIENT)
Dept: ULTRASOUND IMAGING | Age: 28
Discharge: HOME | End: 2023-01-17
Attending: PHYSICIAN ASSISTANT
Payer: MEDICAID

## 2023-01-17 DIAGNOSIS — R59.1 LYMPHADENOPATHY: ICD-10-CM

## 2023-01-17 DIAGNOSIS — L73.2 HIDRADENITIS SUPPURATIVA: ICD-10-CM

## 2023-01-17 DIAGNOSIS — E11.9 CONTROLLED TYPE 2 DIABETES MELLITUS WITHOUT COMPLICATION, WITHOUT LONG-TERM CURRENT USE OF INSULIN (HCC): Primary | ICD-10-CM

## 2023-01-17 PROCEDURE — 76882 US LMTD JT/FCL EVL NVASC XTR: CPT

## 2023-01-18 PROBLEM — E11.9 CONTROLLED TYPE 2 DIABETES MELLITUS WITHOUT COMPLICATION, WITHOUT LONG-TERM CURRENT USE OF INSULIN (HCC): Status: ACTIVE | Noted: 2023-01-18

## 2023-01-18 PROBLEM — L73.2 HIDRADENITIS SUPPURATIVA: Status: ACTIVE | Noted: 2023-01-18

## 2023-01-18 LAB
ALBUMIN SERPL-MCNC: 4.7 G/DL (ref 3.9–5)
ALBUMIN/GLOB SERPL: 1.4 {RATIO} (ref 1.2–2.2)
ALP SERPL-CCNC: 80 IU/L (ref 44–121)
ALT SERPL-CCNC: 38 IU/L (ref 0–32)
AST SERPL-CCNC: 20 IU/L (ref 0–40)
BASOPHILS # BLD AUTO: 0.1 X10E3/UL (ref 0–0.2)
BASOPHILS NFR BLD AUTO: 0 %
BILIRUB SERPL-MCNC: 0.3 MG/DL (ref 0–1.2)
BUN SERPL-MCNC: 10 MG/DL (ref 6–20)
BUN/CREAT SERPL: 14 (ref 9–23)
CALCIUM SERPL-MCNC: 9.9 MG/DL (ref 8.7–10.2)
CHLORIDE SERPL-SCNC: 100 MMOL/L (ref 96–106)
CHOLEST SERPL-MCNC: 183 MG/DL (ref 100–199)
CO2 SERPL-SCNC: 24 MMOL/L (ref 20–29)
CREAT SERPL-MCNC: 0.73 MG/DL (ref 0.57–1)
EGFR: 116 ML/MIN/1.73
EOSINOPHIL # BLD AUTO: 0.2 X10E3/UL (ref 0–0.4)
EOSINOPHIL NFR BLD AUTO: 2 %
ERYTHROCYTE [DISTWIDTH] IN BLOOD BY AUTOMATED COUNT: 11.9 % (ref 11.7–15.4)
EST. AVERAGE GLUCOSE BLD GHB EST-MCNC: 143 MG/DL
GLOBULIN SER CALC-MCNC: 3.3 G/DL (ref 1.5–4.5)
GLUCOSE SERPL-MCNC: 91 MG/DL (ref 70–99)
HBA1C MFR BLD: 6.6 % (ref 4.8–5.6)
HCT VFR BLD AUTO: 45.8 % (ref 34–46.6)
HDLC SERPL-MCNC: 38 MG/DL
HGB BLD-MCNC: 15.4 G/DL (ref 11.1–15.9)
IMM GRANULOCYTES # BLD AUTO: 0 X10E3/UL (ref 0–0.1)
IMM GRANULOCYTES NFR BLD AUTO: 0 %
INTERPRETIVE COMMENT, 330017: ABNORMAL
LDLC SERPL CALC-MCNC: 100 MG/DL (ref 0–99)
LYMPHOCYTES # BLD AUTO: 3 X10E3/UL (ref 0.7–3.1)
LYMPHOCYTES NFR BLD AUTO: 27 %
MCH RBC QN AUTO: 29.8 PG (ref 26.6–33)
MCHC RBC AUTO-ENTMCNC: 33.6 G/DL (ref 31.5–35.7)
MCV RBC AUTO: 89 FL (ref 79–97)
MONOCYTES # BLD AUTO: 0.5 X10E3/UL (ref 0.1–0.9)
MONOCYTES NFR BLD AUTO: 5 %
NEUTROPHILS # BLD AUTO: 7.6 X10E3/UL (ref 1.4–7)
NEUTROPHILS NFR BLD AUTO: 66 %
PLATELET # BLD AUTO: 372 X10E3/UL (ref 150–450)
POTASSIUM SERPL-SCNC: 4.6 MMOL/L (ref 3.5–5.2)
PROT SERPL-MCNC: 8 G/DL (ref 6–8.5)
RBC # BLD AUTO: 5.16 X10E6/UL (ref 3.77–5.28)
SODIUM SERPL-SCNC: 136 MMOL/L (ref 134–144)
T4 FREE SERPL-MCNC: 0.96 NG/DL (ref 0.82–1.77)
THYROPEROXIDASE AB SERPL-ACNC: 169 IU/ML (ref 0–34)
TRIGL SERPL-MCNC: 266 MG/DL (ref 0–149)
TSH SERPL DL<=0.005 MIU/L-ACNC: 5.55 UIU/ML (ref 0.45–4.5)
VLDLC SERPL CALC-MCNC: 45 MG/DL (ref 5–40)
WBC # BLD AUTO: 11.5 X10E3/UL (ref 3.4–10.8)

## 2023-01-18 RX ORDER — SEMAGLUTIDE 1.34 MG/ML
0.25 INJECTION, SOLUTION SUBCUTANEOUS
Qty: 1 BOX | Refills: 1 | Status: SHIPPED | OUTPATIENT
Start: 2023-01-18

## 2023-01-18 RX ORDER — LEVOTHYROXINE SODIUM 50 UG/1
50 TABLET ORAL
Qty: 30 TABLET | Refills: 5 | Status: SHIPPED | OUTPATIENT
Start: 2023-01-18

## 2023-02-17 DIAGNOSIS — F51.01 PRIMARY INSOMNIA: ICD-10-CM

## 2023-02-19 RX ORDER — GABAPENTIN 400 MG/1
CAPSULE ORAL
Qty: 60 CAPSULE | Refills: 2 | Status: SHIPPED | OUTPATIENT
Start: 2023-02-19

## 2023-02-20 DIAGNOSIS — E11.9 CONTROLLED TYPE 2 DIABETES MELLITUS WITHOUT COMPLICATION, WITHOUT LONG-TERM CURRENT USE OF INSULIN (HCC): ICD-10-CM

## 2023-02-20 RX ORDER — SEMAGLUTIDE 1.34 MG/ML
0.25 INJECTION, SOLUTION SUBCUTANEOUS
Qty: 1 BOX | Refills: 1 | Status: CANCELLED | OUTPATIENT
Start: 2023-02-20

## 2023-02-20 NOTE — TELEPHONE ENCOUNTER
Requested Prescriptions     Pending Prescriptions Disp Refills    semaglutide (Ozempic) 0.25 mg or 0.5 mg/dose (2 mg/1.5 ml) subq pen 1 Box 1     Si.25 mg by SubCUTAneous route every seven (7) days. Increase to 0.5 mg every 7 days after 4 weeks. Pharmacy comments: drug nt covered by pt plan. The preferred alternative is victozapak, trulicity, byettap.  Please call/fax pharmacy to change medication along with strength, directions, quantity, and refills

## 2023-02-21 RX ORDER — DULAGLUTIDE 0.75 MG/.5ML
0.75 INJECTION, SOLUTION SUBCUTANEOUS
Qty: 4 PEN | Refills: 5 | Status: SHIPPED | OUTPATIENT
Start: 2023-02-21

## 2023-03-06 ENCOUNTER — HOSPITAL ENCOUNTER (EMERGENCY)
Age: 28
Discharge: HOME | End: 2023-03-06
Attending: EMERGENCY MEDICINE
Payer: MEDICAID

## 2023-03-06 ENCOUNTER — TELEPHONE (OUTPATIENT)
Dept: INTERNAL MEDICINE CLINIC | Age: 28
End: 2023-03-06

## 2023-03-06 VITALS
SYSTOLIC BLOOD PRESSURE: 146 MMHG | DIASTOLIC BLOOD PRESSURE: 95 MMHG | OXYGEN SATURATION: 98 % | HEART RATE: 93 BPM | RESPIRATION RATE: 20 BRPM | TEMPERATURE: 99.5 F | HEIGHT: 65 IN | BODY MASS INDEX: 48.82 KG/M2 | WEIGHT: 293 LBS

## 2023-03-06 DIAGNOSIS — L02.91 ABSCESS: Primary | ICD-10-CM

## 2023-03-06 PROCEDURE — 99283 EMERGENCY DEPT VISIT LOW MDM: CPT

## 2023-03-06 PROCEDURE — 75810000289 HC I&D ABSCESS SIMP/COMP/MULT

## 2023-03-06 RX ORDER — SULFAMETHOXAZOLE AND TRIMETHOPRIM 800; 160 MG/1; MG/1
1 TABLET ORAL 2 TIMES DAILY
Qty: 14 TABLET | Refills: 0 | Status: SHIPPED | OUTPATIENT
Start: 2023-03-06 | End: 2023-03-13

## 2023-03-06 RX ORDER — CEPHALEXIN 500 MG/1
500 CAPSULE ORAL 4 TIMES DAILY
Qty: 28 CAPSULE | Refills: 0 | Status: SHIPPED | OUTPATIENT
Start: 2023-03-06 | End: 2023-03-13

## 2023-03-06 RX ORDER — CLINDAMYCIN PHOSPHATE 10 UG/ML
LOTION TOPICAL 2 TIMES DAILY
Qty: 1 EACH | Refills: 0 | Status: SHIPPED | OUTPATIENT
Start: 2023-03-06 | End: 2023-03-20

## 2023-03-06 RX ORDER — LIDOCAINE HYDROCHLORIDE 10 MG/ML
1 INJECTION, SOLUTION EPIDURAL; INFILTRATION; INTRACAUDAL; PERINEURAL ONCE
Status: DISCONTINUED | OUTPATIENT
Start: 2023-03-06 | End: 2023-03-07 | Stop reason: HOSPADM

## 2023-03-06 NOTE — TELEPHONE ENCOUNTER
I called the patient and verified them by name and date of birth. She has a HS flare up this weekend with a bump & extreme pain. Has called 23 dermatologist offices & cannot get in for another 6 months. I confirmed with Cam & he suggested she got to urgent care or the ER to have the site drained for now. I informed her on the message & that I will call the dermatologist to see if we can get her in sooner. She stated understanding and had no further questions. Dermatologist she is scheduled with: Dr. Garrett Wood.  Harrison Mckeon MD

## 2023-03-06 NOTE — TELEPHONE ENCOUNTER
Pt is calling bc she is in need of a dermatologist. Her HS is acting up and the earliest she could find is October.

## 2023-03-07 NOTE — ED PROVIDER NOTES
Nephrology [facility] EMERGENCY DEPT  EMERGENCY DEPARTMENT ENCOUNTER       Pt Name: Carlos Sawyer  MRN: [id number]  [place] 1995  Date of evaluation: 3/6/2023  Provider: Luna Diallo MD   PCP: Kourntey Damico PA-C  Note Started: 8:31 PM 3/6/23     CHIEF COMPLAINT       Chief Complaint   Patient presents with    Skin Problem     Pt having a \"HS\" flare up of cysts on her chest breast area two days ago. It is the size of a softball now it was the size of a golf ball yesterday. Dr. Bronson Spring sent pt to ED to be opened and drained     HISTORY OF PRESENT ILLNESS: 1 or more elements      History From: Patient, History limited by: None     Carlos Sawyer is a 32 y.o. female who presents with swelling to chest. She reports swelling to under her right breast, swollen and painful over the past 36 hours. No fever, chills. No spontaneous drainage. HS was diagnosed 3.5 weeks ago, prescribed doxycycline and topical antibiotic which she is unsure the name of. She has not yet taken either. Nursing Notes were all reviewed and agreed with or any disagreements were addressed in the HPI. REVIEW OF SYSTEMS        Positives and Pertinent negatives as per HPI.     PAST HISTORY     Past Medical History:  Past Medical History:   Diagnosis Date    Acquired hypothyroidism 3/8/2021    Hearing disorder     Hepatic steatosis 3/8/2021    Psychiatric disorder     anxiety and depression since age 12    Psychiatric problem     depression       Past Surgical History:  Past Surgical History:   Procedure Laterality Date    HX GYN      c/section x1    HX ORTHOPAEDIC      left foot fracture    HX OTHER SURGICAL      mouth surgery    HX OTHER SURGICAL      wisdom teeth 2013    HX OTHER SURGICAL      myrengotomy 2010       Family History:  Family History   Problem Relation Age of Onset    Stroke Mother     Hypertension Father     Cancer Maternal Aunt         ovarian, breast    Hypertension Maternal Aunt     No Known Problems Maternal Uncle Cancer Paternal Aunt         breast    Thyroid Disease Paternal Aunt     Cancer Paternal Uncle         benign brain tumor, lung    Hypertension Paternal Uncle     Depression Paternal Uncle     Suicide Paternal Uncle        Social History:  Social History     Tobacco Use    Smoking status: Former     Types: Cigarettes     Quit date: 2014     Years since quittin.8    Smokeless tobacco: Former    Tobacco comments:     gum   Vaping Use    Vaping Use: Never used   Substance Use Topics    Alcohol use: Yes     Comment: rare    Drug use: No       Allergies:  No Known Allergies    CURRENT MEDICATIONS      Previous Medications    AMOXICILLIN (AMOXIL) 500 MG CAPSULE    Take 1 Capsule by mouth two (2) times a day. BD ANGELA 2ND GEN PEN NEEDLE 32 GAUGE X \" NDLE    USE TO INJECT LIRAGLUTIDE ONCE DAILY USING PEN    DICYCLOMINE (BENTYL) 20 MG TABLET    Take 20 mg by mouth every six (6) hours as needed. DULAGLUTIDE (TRULICITY) 0.39 FU/6.2 ML SUB-Q PEN    0.5 mL by SubCUTAneous route every seven (7) days. FAMOTIDINE (PEPCID) 40 MG TABLET    Take 40 mg by mouth daily. GABAPENTIN (NEURONTIN) 400 MG CAPSULE    TAKE 2 CAPSULES BY MOUTH AT NIGHT FOR INSOMNIA    LEVOTHYROXINE (SYNTHROID) 50 MCG TABLET    Take 1 Tablet by mouth Daily (before breakfast). OMEPRAZOLE (PRILOSEC) 40 MG CAPSULE    TAKE 1 CAPSULE BY MOUTH DAILY    ONDANSETRON (ZOFRAN ODT) 4 MG DISINTEGRATING TABLET    Take 1 Tablet by mouth every eight (8) hours as needed for Nausea. QUETIAPINE (SEROQUEL) 50 MG TABLET    Take 1 Tablet by mouth nightly. SERTRALINE (ZOLOFT) 50 MG TABLET    Take 1 Tablet by mouth daily.        SCREENINGS               No data recorded         PHYSICAL EXAM      ED Triage Vitals [23 1836]   ED Encounter Vitals Group      BP (!) 146/95      Pulse (Heart Rate) 93      Resp Rate 20      Temp 99.5 °F (37.5 °C)      Temp src       O2 Sat (%) 98 %      Weight 320 lb 8.8 oz      Height 5' 5\"      Physical Exam  Vitals and nursing note reviewed. Constitutional:       Appearance: Normal appearance. She is obese. HENT:      Head: Normocephalic and atraumatic. Cardiovascular:      Rate and Rhythm: Normal rate and regular rhythm. Pulses: Normal pulses. Skin:     General: Skin is warm and dry. Comments: Large area of erythema under right breast, small area of fluctuance several centimeters from nipple. Neurological:      Mental Status: She is alert. DIAGNOSTIC RESULTS   LABS:   No results found for this or any previous visit (from the past 12 hour(s)). EKG: If performed, independent interpretation documented below in the MDM section     RADIOLOGY:  Non-plain film images such as CT, Ultrasound and MRI are read by the radiologist. Plain radiographic images are visualized and preliminarily interpreted by the ED Provider with the findings documented in the MDM section. Interpretation per the Radiologist below, if available at the time of this note:     No results found. PROCEDURES   Unless otherwise noted below, none  Procedures     CRITICAL CARE TIME       EMERGENCY DEPARTMENT COURSE and DIFFERENTIAL DIAGNOSIS/MDM   Vitals:    Vitals:    03/06/23 1836   BP: (!) 146/95   Pulse: 93   Resp: 20   Temp: 99.5 °F (37.5 °C)   SpO2: 98%   Weight: 145.4 kg (320 lb 8.8 oz)   Height: 5' 5\" (1.651 m)        Patient was given the following medications:  Medications   lidocaine (PF) (XYLOCAINE) 10 mg/mL (1 %) injection 1 mL (has no administration in time range)       Medical Decision Making  Palpable abscess on exam with area of erythema overlying concerning for overlying cellulitis    She is well-appearing afebrile heart rate is 93 otherwise no SIRS criteria are met. Well-appearing. Will I&D in the emergency department, switch antibiotic to cover strep and MRSA species with Bactrim and Keflex. Will start on topical clindamycin.   Consider imaging however given distance from nipple, palpable area of fluctuance I believe incision and drainage is safe in the emergency department. Consider labs given underlying diabetes however she is not ill-appearing, heart rate 93 afebrile, not septic. Risk  Prescription drug management. ED Course as of 03/06/23 2246   Mon Mar 06, 2023   2111 Procedure Note - Incision and Drainage:   9:11 PM  Performed by: Roula Ortiz PA-C  Verbal Consent obtained and witnessed by attending physician   Complexity: simple   (Note: Complex drainage include wounds that: 1. involve multiple abscesses, 2. Are probed to break up loculations or 3. Are packed after drainage.)  Skin prepped with Chlorprep. Sterile field established. Anesthesia achieved using a local infiltration of 1 mL lidocaine 1% without epinephrine. Abscess to breast was incised with # 11 blade, and moderate amount of purulent drainage was expressed. Wound probed and irrigated. Estimated blood loss: minimal  The procedure took 1-15 minutes, and pt tolerated well. [TL]      ED Course User Index  [TL] JAROCHO Du     FINAL IMPRESSION     1. Abscess          DISPOSITION/PLAN   Juan Walters Jason's  results have been reviewed with her. She has been counseled regarding her diagnosis, treatment, and plan. She verbally conveys understanding and agreement of the signs, symptoms, diagnosis, treatment and prognosis and additionally agrees to follow up as discussed. She also agrees with the care-plan and conveys that all of her questions have been answered. I have also provided discharge instructions for her that include: educational information regarding their diagnosis and treatment, and list of reasons why they would want to return to the ED prior to their follow-up appointment, should her condition change. CLINICAL IMPRESSION    Discharge Note: The patient is stable for discharge home. The signs, symptoms, diagnosis, and discharge instructions have been discussed, understanding conveyed, and agreed upon.  The patient is to follow up as recommended or return to ER should their symptoms worsen. PATIENT REFERRED TO:  Follow-up Information       Follow up With Specialties Details Why Contact Info    [facility] EMERGENCY DEPT Emergency Medicine  As needed, If symptoms worsen [address] [place] [address]  [phone]              DISCHARGE MEDICATIONS:  Current Discharge Medication List        START taking these medications    Details   trimethoprim-sulfamethoxazole (Bactrim DS) 160-800 mg per tablet Take 1 Tablet by mouth two (2) times a day for 7 days. Qty: 14 Tablet, Refills: 0  Start date: 3/6/2023, End date: 3/13/2023      cephALEXin (Keflex) 500 mg capsule Take 1 Capsule by mouth four (4) times daily for 7 days. Qty: 28 Capsule, Refills: 0  Start date: 3/6/2023, End date: 3/13/2023      clindamycin (CLEOCIN T) 1 % lotion Apply  to affected area two (2) times a day for 14 days. use thin film on affected area  Qty: 1 Each, Refills: 0  Start date: 3/6/2023, End date: 3/20/2023               DISCONTINUED MEDICATIONS:  Current Discharge Medication List          I am the Primary Clinician of Record. Britta Trejo MD (electronically signed)    (Please note that parts of this dictation were completed with voice recognition software. Quite often unanticipated grammatical, syntax, homophones, and other interpretive errors are inadvertently transcribed by the computer software. Please disregards these errors.  Please excuse any errors that have escaped final proofreading.)

## 2023-03-08 NOTE — TELEPHONE ENCOUNTER
I called Dr. Darek Ritter office & they are unable to get her in sooner. I called the patient and verified them by name and date of birth. I informed her on the message from dermatology & that I would check with other office. She stated understanding and had no further questions. I called several different offices & none of them accept her insurance. One office told me to check with [address] I was on hold with [facility] [employer] for a while & hung up. I will try again tomorrow.

## 2023-03-09 NOTE — TELEPHONE ENCOUNTER
I called the patient and verified them by name and date of birth. I informed her that all the practices I contacted do not accept her insurance. [facility] takes her insurance but they do not have a new patient appointment until April of next year. I informed her we are going to keep what we got & check with Cam to see how we can manage her condition up until then. She stated understanding and had no further questions.

## 2023-04-26 VITALS
WEIGHT: 293 LBS | HEIGHT: 65 IN | HEART RATE: 85 BPM | OXYGEN SATURATION: 97 % | BODY MASS INDEX: 48.82 KG/M2 | RESPIRATION RATE: 18 BRPM | SYSTOLIC BLOOD PRESSURE: 141 MMHG | TEMPERATURE: 97.9 F | DIASTOLIC BLOOD PRESSURE: 88 MMHG

## 2023-04-26 PROCEDURE — 75810000275 HC EMERGENCY DEPT VISIT NO LEVEL OF CARE

## 2023-04-26 RX ORDER — ONDANSETRON 2 MG/ML
4 INJECTION INTRAMUSCULAR; INTRAVENOUS
Status: DISCONTINUED | OUTPATIENT
Start: 2023-04-26 | End: 2023-04-27 | Stop reason: HOSPADM

## 2023-04-27 ENCOUNTER — TELEPHONE (OUTPATIENT)
Dept: INTERNAL MEDICINE CLINIC | Age: 28
End: 2023-04-27

## 2023-04-27 ENCOUNTER — HOSPITAL ENCOUNTER (EMERGENCY)
Age: 28
Discharge: LEFT BEFORE DISCHARGE | End: 2023-04-27
Payer: MEDICAID

## 2023-04-27 NOTE — TELEPHONE ENCOUNTER
I called the patient and verified them by name and date of birth. Has been having nausea, vomiting & diarrhea. Vomited all day on yesterday (was periodic) then became constant with blood in vomit mixed with stomach acid. Cut grass yesterday & after felt a waxy feeling in her lungs, even felt like they were burning. She went to [facility] ER last night, got triaged & after waiting for 2 hours she left. No one was called back within that time frame. Today she is feeling better after taking Zofran. No longer vomiting but still feeling nauseous.

## 2023-04-27 NOTE — TELEPHONE ENCOUNTER
Patient called and said that she is vomiting and having  Diarrhea she went to urgent care waited a long time and wasn't seen.

## 2023-05-22 ENCOUNTER — HOSPITAL ENCOUNTER (EMERGENCY)
Facility: HOSPITAL | Age: 28
Discharge: HOME | End: 2023-05-22
Attending: STUDENT IN AN ORGANIZED HEALTH CARE EDUCATION/TRAINING PROGRAM
Payer: MEDICAID

## 2023-05-22 ENCOUNTER — APPOINTMENT (OUTPATIENT)
Facility: HOSPITAL | Age: 28
End: 2023-05-22
Payer: MEDICAID

## 2023-05-22 VITALS
HEART RATE: 84 BPM | OXYGEN SATURATION: 99 % | TEMPERATURE: 97.7 F | SYSTOLIC BLOOD PRESSURE: 92 MMHG | DIASTOLIC BLOOD PRESSURE: 62 MMHG | RESPIRATION RATE: 16 BRPM

## 2023-05-22 DIAGNOSIS — S93.402A SPRAIN OF LEFT ANKLE, UNSPECIFIED LIGAMENT, INITIAL ENCOUNTER: Primary | ICD-10-CM

## 2023-05-22 PROCEDURE — 99283 EMERGENCY DEPT VISIT LOW MDM: CPT

## 2023-05-22 PROCEDURE — 73610 X-RAY EXAM OF ANKLE: CPT

## 2023-05-22 PROCEDURE — 6370000000 HC RX 637 (ALT 250 FOR IP): Performed by: STUDENT IN AN ORGANIZED HEALTH CARE EDUCATION/TRAINING PROGRAM

## 2023-05-22 PROCEDURE — 73620 X-RAY EXAM OF FOOT: CPT

## 2023-05-22 RX ORDER — ACETAMINOPHEN 325 MG/1
650 TABLET ORAL
Status: COMPLETED | OUTPATIENT
Start: 2023-05-22 | End: 2023-05-22

## 2023-05-22 RX ORDER — CYCLOBENZAPRINE HCL 10 MG
10 TABLET ORAL
Status: COMPLETED | OUTPATIENT
Start: 2023-05-22 | End: 2023-05-22

## 2023-05-22 RX ADMIN — CYCLOBENZAPRINE 10 MG: 10 TABLET, FILM COATED ORAL at 23:05

## 2023-05-22 RX ADMIN — ACETAMINOPHEN 650 MG: 325 TABLET ORAL at 23:05

## 2023-05-22 ASSESSMENT — PAIN DESCRIPTION - LOCATION: LOCATION: FOOT

## 2023-05-22 ASSESSMENT — PAIN DESCRIPTION - ORIENTATION: ORIENTATION: LEFT

## 2023-05-22 ASSESSMENT — PAIN DESCRIPTION - DESCRIPTORS: DESCRIPTORS: ACHING;THROBBING

## 2023-05-22 ASSESSMENT — PAIN - FUNCTIONAL ASSESSMENT: PAIN_FUNCTIONAL_ASSESSMENT: 0-10

## 2023-05-22 ASSESSMENT — PAIN SCALES - GENERAL: PAINLEVEL_OUTOF10: 10

## 2023-05-23 ASSESSMENT — ENCOUNTER SYMPTOMS
SORE THROAT: 0
SHORTNESS OF BREATH: 0
ABDOMINAL PAIN: 0
EYE PAIN: 0
COUGH: 0
NAUSEA: 0
DIARRHEA: 0
VOMITING: 0

## 2023-05-23 NOTE — ED PROVIDER NOTES
ovarian, breast    Stroke Mother     Hypertension Father           SOCIAL HISTORY       Social History     Socioeconomic History    Marital status: Single   Tobacco Use    Smoking status: Former     Types: Cigarettes     Quit date: 2014     Years since quittin.0    Smokeless tobacco: Former    Tobacco comments:     Quit smoking: gum   Substance and Sexual Activity    Alcohol use: Yes    Drug use: No           PHYSICAL EXAM    (up to 7 for level 4, 8 or more for level 5)     ED Triage Vitals [23 2218]   BP Temp Temp Source Pulse Respirations SpO2 Height Weight   92/62 97.7 °F (36.5 °C) Temporal 84 16 99 % -- --       There is no height or weight on file to calculate BMI. Physical Exam  Vitals and nursing note reviewed. Constitutional:       General: She is not in acute distress. Appearance: Normal appearance. Eyes:      Extraocular Movements: Extraocular movements intact. Pupils: Pupils are equal, round, and reactive to light. Cardiovascular:      Rate and Rhythm: Normal rate and regular rhythm. Heart sounds: Normal heart sounds. Pulmonary:      Effort: Pulmonary effort is normal.      Breath sounds: Normal breath sounds. Abdominal:      General: Bowel sounds are normal.      Palpations: Abdomen is soft. Tenderness: There is no abdominal tenderness. Musculoskeletal:      Right ankle: Normal.      Left ankle: Swelling present. Tenderness present over the lateral malleolus and medial malleolus. Decreased range of motion. Normal pulse. Skin:     General: Skin is warm and dry. Neurological:      General: No focal deficit present. Mental Status: She is alert and oriented to person, place, and time. Psychiatric:         Mood and Affect: Mood normal.         Behavior: Behavior normal.         Thought Content:  Thought content normal.       DIAGNOSTIC RESULTS     EKG: All EKG's are interpreted by the Emergency Department Physician who either signs or Co-signs this

## 2023-05-23 NOTE — ED NOTES
Provider discussed discharge instructions with patient. Patient voiced understanding. Pt left ED ambulatory with discharge instructions in hand.       [address]  17/52/26 [id number]

## 2023-05-23 NOTE — ED TRIAGE NOTES
Pt presents to department with a CC of left foot pain. Pt was with her child on playground and part of the swing fell off and landed on the top part of her foot, causing her to roll her ankle and fall backwards onto her head/neck onto rubber mulch. Denies LOC.

## 2023-06-05 ENCOUNTER — OFFICE VISIT (OUTPATIENT)
Facility: CLINIC | Age: 28
End: 2023-06-05
Payer: MEDICAID

## 2023-06-05 ENCOUNTER — TELEPHONE (OUTPATIENT)
Facility: CLINIC | Age: 28
End: 2023-06-05

## 2023-06-05 VITALS
SYSTOLIC BLOOD PRESSURE: 137 MMHG | HEIGHT: 65 IN | DIASTOLIC BLOOD PRESSURE: 84 MMHG | OXYGEN SATURATION: 95 % | RESPIRATION RATE: 16 BRPM | TEMPERATURE: 98.5 F | WEIGHT: 293 LBS | BODY MASS INDEX: 48.82 KG/M2 | HEART RATE: 82 BPM

## 2023-06-05 DIAGNOSIS — E11.9 CONTROLLED TYPE 2 DIABETES MELLITUS WITHOUT COMPLICATION, WITHOUT LONG-TERM CURRENT USE OF INSULIN (HCC): Primary | ICD-10-CM

## 2023-06-05 DIAGNOSIS — F51.01 PRIMARY INSOMNIA: ICD-10-CM

## 2023-06-05 DIAGNOSIS — E03.9 ACQUIRED HYPOTHYROIDISM: ICD-10-CM

## 2023-06-05 DIAGNOSIS — S93.422D SPRAIN OF LEFT MEDIAL ANKLE JOINT, SUBSEQUENT ENCOUNTER: ICD-10-CM

## 2023-06-05 DIAGNOSIS — E66.01 MORBID OBESITY (HCC): ICD-10-CM

## 2023-06-05 DIAGNOSIS — E11.9 CONTROLLED TYPE 2 DIABETES MELLITUS WITHOUT COMPLICATION, WITHOUT LONG-TERM CURRENT USE OF INSULIN (HCC): ICD-10-CM

## 2023-06-05 DIAGNOSIS — M54.2 NECK PAIN ON RIGHT SIDE: ICD-10-CM

## 2023-06-05 LAB — HBA1C MFR BLD: 6 %

## 2023-06-05 PROCEDURE — 99214 OFFICE O/P EST MOD 30 MIN: CPT | Performed by: PHYSICIAN ASSISTANT

## 2023-06-05 PROCEDURE — 83036 HEMOGLOBIN GLYCOSYLATED A1C: CPT | Performed by: PHYSICIAN ASSISTANT

## 2023-06-05 PROCEDURE — 3044F HG A1C LEVEL LT 7.0%: CPT | Performed by: PHYSICIAN ASSISTANT

## 2023-06-05 RX ORDER — CYCLOBENZAPRINE HCL 10 MG
10 TABLET ORAL NIGHTLY PRN
Qty: 30 TABLET | Refills: 2 | Status: SHIPPED | OUTPATIENT
Start: 2023-06-05 | End: 2023-06-05 | Stop reason: DRUGHIGH

## 2023-06-05 RX ORDER — CYCLOBENZAPRINE HCL 10 MG
10 TABLET ORAL NIGHTLY PRN
Qty: 30 TABLET | Refills: 0 | Status: SHIPPED | OUTPATIENT
Start: 2023-06-05 | End: 2023-09-03

## 2023-06-05 SDOH — ECONOMIC STABILITY: FOOD INSECURITY: WITHIN THE PAST 12 MONTHS, THE FOOD YOU BOUGHT JUST DIDN'T LAST AND YOU DIDN'T HAVE MONEY TO GET MORE.: SOMETIMES TRUE

## 2023-06-05 SDOH — ECONOMIC STABILITY: FOOD INSECURITY: WITHIN THE PAST 12 MONTHS, YOU WORRIED THAT YOUR FOOD WOULD RUN OUT BEFORE YOU GOT MONEY TO BUY MORE.: SOMETIMES TRUE

## 2023-06-05 SDOH — ECONOMIC STABILITY: HOUSING INSECURITY
IN THE LAST 12 MONTHS, WAS THERE A TIME WHEN YOU DID NOT HAVE A STEADY PLACE TO SLEEP OR SLEPT IN A SHELTER (INCLUDING NOW)?: NO

## 2023-06-05 SDOH — ECONOMIC STABILITY: INCOME INSECURITY: HOW HARD IS IT FOR YOU TO PAY FOR THE VERY BASICS LIKE FOOD, HOUSING, MEDICAL CARE, AND HEATING?: SOMEWHAT HARD

## 2023-06-05 ASSESSMENT — ENCOUNTER SYMPTOMS
BACK PAIN: 1
RESPIRATORY NEGATIVE: 1
BLOOD IN STOOL: 0
EYES NEGATIVE: 1
DIARRHEA: 0
ABDOMINAL PAIN: 0
CONSTIPATION: 0
VOMITING: 0
SHORTNESS OF BREATH: 0
NAUSEA: 0

## 2023-06-05 ASSESSMENT — PATIENT HEALTH QUESTIONNAIRE - PHQ9
2. FEELING DOWN, DEPRESSED OR HOPELESS: 0
SUM OF ALL RESPONSES TO PHQ QUESTIONS 1-9: 0
1. LITTLE INTEREST OR PLEASURE IN DOING THINGS: 0
SUM OF ALL RESPONSES TO PHQ QUESTIONS 1-9: 0
SUM OF ALL RESPONSES TO PHQ9 QUESTIONS 1 & 2: 0
SUM OF ALL RESPONSES TO PHQ QUESTIONS 1-9: 0
SUM OF ALL RESPONSES TO PHQ QUESTIONS 1-9: 0

## 2023-06-05 NOTE — TELEPHONE ENCOUNTER
Can you mail thyroid labs to patient and advise her to recheck labs at her earliest convenience to recheck thyroid levels

## 2023-06-05 NOTE — PROGRESS NOTES
[facility]  Identified pt with two pt identifiers(name and ). Chief Complaint   Patient presents with    Follow-Up from [address] [place] [address] //        Reviewed record In preparation for visit and have obtained necessary documentation. 1. Have you been to the ER, urgent care clinic or hospitalized since your last visit? Yes. [facility]     2. Have you seen or consulted any other health care providers outside of the [address] [place] [address] since your last visit? Include any pap smears or colon screening. No    Patient does not have an advance directive. Vitals reviewed with provider. Health Maintenance reviewed:     Health Maintenance Due   Topic    Pneumococcal 0-64 years Vaccine (1 - PCV)    Diabetic foot exam     Varicella vaccine (2 of 2 - 13+ 2-dose series)    Hepatitis A vaccine (2 of 2 - 2-dose series)    Diabetic Alb to Cr ratio (uACR) test     Diabetic retinal exam     Hepatitis C screen     Hepatitis B vaccine (1 of 3 - Risk 3-dose series)    Pap smear     COVID-19 Vaccine (3 - Booster for Pfizer series)          Wt Readings from Last 3 Encounters:   23 (!) 325 lb 9.6 oz (147.7 kg)   21 (!) 315 lb (142.9 kg)   21 (!) 314 lb (142.4 kg)        Temp Readings from Last 3 Encounters:   23 97.7 °F (36.5 °C) (Temporal)        BP Readings from Last 3 Encounters:   23 92/62   21 115/64   21 131/83        Pulse Readings from Last 3 Encounters:   23 84   21 96   21 (!) 103      No flowsheet data found. Learning Assessment:     No flowsheet data found. Fall Risk Assessment:   :     Amb Fall Risk Assessment and TUG Test 2021   Total Score 0       Abuse Screening:   :     No flowsheet data found. ADL Screening:   :   No flowsheet data found.
ankle), [place]  Improving, suspect healing sprain. Continue ice. Advised NSAIDS in AM before work and keep compression  Neck pain on right side  -     Discontinue: cyclobenzaprine (FLEXERIL) 10 MG tablet; Take 1 tablet by mouth nightly as needed for Muscle spasms  -     cyclobenzaprine (FLEXERIL) 10 MG tablet; Take 1 tablet by mouth nightly as needed for Muscle spasms  Trial flexeril 10 mg at night for muscle relaxant  Primary insomnia  -     Discontinue: cyclobenzaprine (FLEXERIL) 10 MG tablet; Take 1 tablet by mouth nightly as needed for Muscle spasms  -     cyclobenzaprine (FLEXERIL) 10 MG tablet; Take 1 tablet by mouth nightly as needed for Muscle spasms  Trial cyclobenzaprine for sleep aid and muscle relaxant for back and neck  Morbid obesity ([facility] [address]  -     Ashley Pace MD, Bariatrics (non Surgical), [facility], [place] ([address])  Continue trial of low carb, vegetarian diet and current exercise routine for few more weeks. If not seeing improvement in weight send to weight loss clinic. Did not tolerate trulicity  Acquired hypothyroidism  -     TSH; Future  -     T4; Future  Mail labs, recheck thyroid in future         I have discussed the diagnosis with the patient and the intended plan as seen in the above orders. Patient is in agreement. The patient has received an after-visit summary and questions were answered concerning future plans. I have discussed medication side effects and warnings with the patient as well.     Christian Downs PA-C

## 2023-06-06 LAB
ALBUMIN/CREAT UR: 18 MG/G CREAT (ref 0–29)
CREAT UR-MCNC: 114.6 MG/DL
MICROALBUMIN UR-MCNC: 20.7 UG/ML
SPECIMEN STATUS REPORT: NORMAL

## 2023-08-08 ENCOUNTER — CLINICAL DOCUMENTATION (OUTPATIENT)
Age: 28
End: 2023-08-08

## 2023-08-08 NOTE — PROGRESS NOTES
Patient was scheduled for our [address] [facility] virtual orientation on Wednesday August 2, 2023 at 12:00pm and patient did not show. Patient is sent an email at the time of registration with the zoom link along with email reminders prior to the date of the orientation.

## 2023-08-10 ENCOUNTER — CLINICAL DOCUMENTATION (OUTPATIENT)
Age: 28
End: 2023-08-10

## 2023-08-10 NOTE — PROGRESS NOTES
Patient was scheduled for our [address] [facility] virtual orientation on Wednesday August 9, 2023 at 12:00pm and patient did not show. Patient is sent an email at the time of registration with the zoom link along with email reminders prior to the date of the orientation.

## 2024-04-22 ENCOUNTER — OFFICE VISIT (OUTPATIENT)
Facility: CLINIC | Age: 29
End: 2024-04-22
Payer: MEDICAID

## 2024-04-22 VITALS
HEIGHT: 65 IN | SYSTOLIC BLOOD PRESSURE: 134 MMHG | HEART RATE: 87 BPM | BODY MASS INDEX: 48.82 KG/M2 | DIASTOLIC BLOOD PRESSURE: 66 MMHG | RESPIRATION RATE: 16 BRPM | TEMPERATURE: 98.2 F | OXYGEN SATURATION: 98 % | WEIGHT: 293 LBS

## 2024-04-22 DIAGNOSIS — E03.9 ACQUIRED HYPOTHYROIDISM: ICD-10-CM

## 2024-04-22 DIAGNOSIS — E66.01 MORBID OBESITY WITH BMI OF 50.0-59.9, ADULT (HCC): ICD-10-CM

## 2024-04-22 DIAGNOSIS — F31.81 BIPOLAR 2 DISORDER (HCC): ICD-10-CM

## 2024-04-22 DIAGNOSIS — E11.9 CONTROLLED TYPE 2 DIABETES MELLITUS WITHOUT COMPLICATION, WITHOUT LONG-TERM CURRENT USE OF INSULIN (HCC): ICD-10-CM

## 2024-04-22 DIAGNOSIS — N92.6 ABNORMAL MENSES: Primary | ICD-10-CM

## 2024-04-22 DIAGNOSIS — R53.83 OTHER FATIGUE: ICD-10-CM

## 2024-04-22 PROCEDURE — 99214 OFFICE O/P EST MOD 30 MIN: CPT | Performed by: PHYSICIAN ASSISTANT

## 2024-04-22 ASSESSMENT — ENCOUNTER SYMPTOMS
VOMITING: 0
DIARRHEA: 0
BLOOD IN STOOL: 0
RESPIRATORY NEGATIVE: 1
SHORTNESS OF BREATH: 0
NAUSEA: 0
EYES NEGATIVE: 1
CONSTIPATION: 0
ABDOMINAL PAIN: 0

## 2024-04-22 ASSESSMENT — PATIENT HEALTH QUESTIONNAIRE - PHQ9
9. THOUGHTS THAT YOU WOULD BE BETTER OFF DEAD, OR OF HURTING YOURSELF: NOT AT ALL
SUM OF ALL RESPONSES TO PHQ QUESTIONS 1-9: 16
2. FEELING DOWN, DEPRESSED OR HOPELESS: MORE THAN HALF THE DAYS
5. POOR APPETITE OR OVEREATING: NEARLY EVERY DAY
10. IF YOU CHECKED OFF ANY PROBLEMS, HOW DIFFICULT HAVE THESE PROBLEMS MADE IT FOR YOU TO DO YOUR WORK, TAKE CARE OF THINGS AT HOME, OR GET ALONG WITH OTHER PEOPLE: VERY DIFFICULT
8. MOVING OR SPEAKING SO SLOWLY THAT OTHER PEOPLE COULD HAVE NOTICED. OR THE OPPOSITE, BEING SO FIGETY OR RESTLESS THAT YOU HAVE BEEN MOVING AROUND A LOT MORE THAN USUAL: NOT AT ALL
SUM OF ALL RESPONSES TO PHQ9 QUESTIONS 1 & 2: 4
4. FEELING TIRED OR HAVING LITTLE ENERGY: NEARLY EVERY DAY
7. TROUBLE CONCENTRATING ON THINGS, SUCH AS READING THE NEWSPAPER OR WATCHING TELEVISION: NEARLY EVERY DAY
SUM OF ALL RESPONSES TO PHQ QUESTIONS 1-9: 16
6. FEELING BAD ABOUT YOURSELF - OR THAT YOU ARE A FAILURE OR HAVE LET YOURSELF OR YOUR FAMILY DOWN: NEARLY EVERY DAY
3. TROUBLE FALLING OR STAYING ASLEEP: NOT AT ALL
1. LITTLE INTEREST OR PLEASURE IN DOING THINGS: MORE THAN HALF THE DAYS
SUM OF ALL RESPONSES TO PHQ QUESTIONS 1-9: 16
SUM OF ALL RESPONSES TO PHQ QUESTIONS 1-9: 16

## 2024-04-22 NOTE — PROGRESS NOTES
Cordelia Whitman  Identified pt with two pt identifiers(name and ).     Chief Complaint   Patient presents with    Dysmenorrhea     Room 4B // been on for 3 weeks & feeling groggy       Reviewed record In preparation for visit and have obtained necessary documentation.     1. Have you been to the ER, urgent care clinic or hospitalized since your last visit? No     2. Have you seen or consulted any other health care providers outside of the [facility] since your last visit? Include any pap smears or colon screening. No    Patient has an advance directive.     Vitals reviewed with provider.    Health Maintenance reviewed:     Health Maintenance Due   Topic    Diabetic retinal exam     Hepatitis C screen     Pap smear     COVID-19 Vaccine (3 - 2023-24 season)    Lipids     GFR test (Diabetes, CKD 3-4, OR last GFR 15-59)           Wt Readings from Last 3 Encounters:   24 (!) 155.9 kg (343 lb 12.8 oz)   23 (!) 147.7 kg (325 lb 9.6 oz)   21 (!) 142.9 kg (315 lb)        Temp Readings from Last 3 Encounters:   24 98.2 °F (36.8 °C) (Oral)   23 98.5 °F (36.9 °C) (Oral)   23 97.7 °F (36.5 °C) (Temporal)        BP Readings from Last 3 Encounters:   24 134/66   23 137/84   23 92/62        Pulse Readings from Last 3 Encounters:   24 87   23 82   23 84             No data to display                  Learning Assessment:   :         2023    11:30 AM   [facility] AMB LEARNING ASSESSMENT   Primary Learner Patient   Primary Language ENGLISH   Learning Preference DEMONSTRATION   Answered By Patient   Relationship to Learner SELF         Fall Risk Assessment:         2021    11:36 AM   Amb Fall Risk Assessment and TUG Test   Total Score 0         Abuse Screening:          No data to display                  ADL Screenin/5/2023    11:00 AM   ADL ASSESSMENT   Feeding yourself No Help Needed   Getting from bed to chair No Help 
Cordelia Whitman is a 28 y.o. year old female seen in clinic today for   Chief Complaint   Patient presents with    Dysmenorrhea     Room 4B // been on for 3 weeks & feeling groggy       she presents with 3 weeks of abnormally prolonged and heavy menstrual cycles. She was unable to get in with gyn quickly at [facility] and was told they could not see her for 9 months. She has been feeling groggy and fatigued with the prolonged bleeding and has concerned she may have fibroids. She has no hx of similar sx's. She has not been on birth control for past year or so.   She has hx of DM2 and hypothyroid. Her last A1c was 6.0 and her last thyroid check was 1 year ago.  She notes her mental health has been worse too, noting she has been more irritable with her children.   Also notes she has been gaining weight, works out 3-4 days a week at the gym. No change in diet.    Denies any lightheadedness or dizziness.    Current Outpatient Medications on File Prior to Visit   Medication Sig Dispense Refill    levothyroxine (SYNTHROID) 50 MCG tablet Take by mouth every morning (before breakfast)       No current facility-administered medications on file prior to visit.         Allergies   Allergen Reactions    Trulicity [Dulaglutide] Nausea And Vomiting     Past Medical History:   Diagnosis Date    Acquired hypothyroidism 3/8/2021    Hearing disorder     Hepatic steatosis 3/8/2021    Psychiatric disorder     anxiety and depression since age 16    Psychiatric problem     depression      Past Surgical History:   Procedure Laterality Date    GYN      c/section x1    ORTHOPEDIC SURGERY      left foot fracture    OTHER SURGICAL HISTORY      wisdom teeth 2013    OTHER SURGICAL HISTORY      myrengotomy 2010    OTHER SURGICAL HISTORY      mouth surgery        Family History   Problem Relation Age of Onset    Suicide Paternal Uncle     Depression Paternal Uncle     Hypertension Paternal Uncle     Cancer Paternal Uncle         
benign brain tumor, lung    Thyroid Disease Paternal Aunt     Cancer Paternal Aunt         breast    No Known Problems Maternal Uncle     Hypertension Maternal Aunt     Cancer Maternal Aunt         ovarian, breast    Stroke Mother     Hypertension Father         Social History     Socioeconomic History    Marital status: Single     Spouse name: Not on file    Number of children: Not on file    Years of education: Not on file    Highest education level: Not on file   Occupational History    Not on file   Tobacco Use    Smoking status: Former     Current packs/day: 0.00     Types: Cigarettes     Quit date: 2014     Years since quittin.9    Smokeless tobacco: Former    Tobacco comments:     Quit smoking: gum   Vaping Use    Vaping Use: Former   Substance and Sexual Activity    Alcohol use: Yes    Drug use: No    Sexual activity: Not on file   Other Topics Concern    Not on file   Social History Narrative    Not on file     Social Determinants of Health     Financial Resource Strain: Medium Risk (2023)    Overall Financial Resource Strain (CARDIA)     Difficulty of Paying Living Expenses: Somewhat hard   Food Insecurity: Not on file (2023)   Recent Concern: Food Insecurity - Food Insecurity Present (2023)    Hunger Vital Sign     Worried About Running Out of Food in the Last Year: Sometimes true     Ran Out of Food in the Last Year: Sometimes true   Transportation Needs: Unknown (2023)    PRAPARE - Transportation     Lack of Transportation (Medical): Not on file     Lack of Transportation (Non-Medical): No   Physical Activity: Not on file   Stress: Not on file   Social Connections: Not on file   Intimate Partner Violence: Not on file   Housing Stability: Unknown (2023)    Housing Stability Vital Sign     Unable to Pay for Housing in the Last Year: Not on file     Number of Places Lived in the Last Year: Not on file     Unstable Housing in the Last Year: No         /66 (Site: Left Upper

## 2024-04-22 NOTE — PATIENT INSTRUCTIONS
For a therapist and/or psychiatrist, call:    [facility]  Human Services Building, [address], [place] [address]  [phone]    [facility]  [address], [place] [address]  [phone]    [facility]   [address], [address], [address], [place], [place] [id number] [phone]    [facility]  [address], [place] [address]  [phone]    [facility]  [address] [place] [address]  [phone]    [facility]  [address], [place] [address]  [phone]    [facility]  [address], [address], [place] [address]  [phone]    [facility]  [address], [address], [place], [place] [id number] [phone]    [facility]  [address], [address], [place] [address] [phone]    [facility]  [address] [place] [address] [phone]    [facility], [address], [place] [address] [phone]    [facility]  [place]: [address], [place] [address] [phone]  Takes MEDICAID

## 2024-04-23 ENCOUNTER — TELEPHONE (OUTPATIENT)
Age: 29
End: 2024-04-23

## 2024-04-23 LAB
ALBUMIN SERPL-MCNC: 3.6 G/DL (ref 3.5–5)
ALBUMIN/GLOB SERPL: 1 (ref 1.1–2.2)
ALP SERPL-CCNC: 72 U/L (ref 45–117)
ALT SERPL-CCNC: 40 U/L (ref 12–78)
ANION GAP SERPL CALC-SCNC: 4 MMOL/L (ref 5–15)
AST SERPL-CCNC: 22 U/L (ref 15–37)
BASOPHILS # BLD: 0 K/UL (ref 0–0.1)
BASOPHILS NFR BLD: 0 % (ref 0–1)
BILIRUB SERPL-MCNC: 0.2 MG/DL (ref 0.2–1)
BUN SERPL-MCNC: 14 MG/DL (ref 6–20)
BUN/CREAT SERPL: 19 (ref 12–20)
CALCIUM SERPL-MCNC: 8.6 MG/DL (ref 8.5–10.1)
CHLORIDE SERPL-SCNC: 110 MMOL/L (ref 97–108)
CHOLEST SERPL-MCNC: 175 MG/DL
CO2 SERPL-SCNC: 24 MMOL/L (ref 21–32)
CREAT SERPL-MCNC: 0.72 MG/DL (ref 0.55–1.02)
DIFFERENTIAL METHOD BLD: ABNORMAL
EOSINOPHIL # BLD: 0.1 K/UL (ref 0–0.4)
EOSINOPHIL NFR BLD: 1 % (ref 0–7)
ERYTHROCYTE [DISTWIDTH] IN BLOOD BY AUTOMATED COUNT: 12.8 % (ref 11.5–14.5)
EST. AVERAGE GLUCOSE BLD GHB EST-MCNC: 123 MG/DL
ESTRADIOL SERPL-MCNC: 40.3 PG/ML
FSH SERPL-ACNC: 7.3 MIU/ML
GLOBULIN SER CALC-MCNC: 3.6 G/DL (ref 2–4)
GLUCOSE SERPL-MCNC: 134 MG/DL (ref 65–100)
HBA1C MFR BLD: 5.9 % (ref 4–5.6)
HCT VFR BLD AUTO: 36.2 % (ref 35–47)
HDLC SERPL-MCNC: 40 MG/DL
HDLC SERPL: 4.4 (ref 0–5)
HGB BLD-MCNC: 11.7 G/DL (ref 11.5–16)
IMM GRANULOCYTES # BLD AUTO: 0.1 K/UL (ref 0–0.04)
IMM GRANULOCYTES NFR BLD AUTO: 1 % (ref 0–0.5)
LDLC SERPL CALC-MCNC: 86.6 MG/DL (ref 0–100)
LH SERPL-ACNC: 3.3 MIU/ML
LYMPHOCYTES # BLD: 2.7 K/UL (ref 0.8–3.5)
LYMPHOCYTES NFR BLD: 28 % (ref 12–49)
MCH RBC QN AUTO: 29.9 PG (ref 26–34)
MCHC RBC AUTO-ENTMCNC: 32.3 G/DL (ref 30–36.5)
MCV RBC AUTO: 92.6 FL (ref 80–99)
MONOCYTES # BLD: 0.5 K/UL (ref 0–1)
MONOCYTES NFR BLD: 5 % (ref 5–13)
NEUTS SEG # BLD: 6.2 K/UL (ref 1.8–8)
NEUTS SEG NFR BLD: 65 % (ref 32–75)
NRBC # BLD: 0 K/UL (ref 0–0.01)
NRBC BLD-RTO: 0 PER 100 WBC
PLATELET # BLD AUTO: 346 K/UL (ref 150–400)
PMV BLD AUTO: 9.4 FL (ref 8.9–12.9)
POTASSIUM SERPL-SCNC: 4.3 MMOL/L (ref 3.5–5.1)
PROLACTIN SERPL-MCNC: 5.6 NG/ML
PROT SERPL-MCNC: 7.2 G/DL (ref 6.4–8.2)
RBC # BLD AUTO: 3.91 M/UL (ref 3.8–5.2)
SODIUM SERPL-SCNC: 138 MMOL/L (ref 136–145)
T4 SERPL-MCNC: 6.5 UG/DL (ref 4.8–13.9)
TRIGL SERPL-MCNC: 242 MG/DL
TSH SERPL DL<=0.05 MIU/L-ACNC: 10.3 UIU/ML (ref 0.36–3.74)
VLDLC SERPL CALC-MCNC: 48.4 MG/DL
WBC # BLD AUTO: 9.7 K/UL (ref 3.6–11)

## 2024-04-23 RX ORDER — LEVOTHYROXINE SODIUM 0.07 MG/1
75 TABLET ORAL DAILY
Qty: 30 TABLET | Refills: 5 | Status: SHIPPED | OUTPATIENT
Start: 2024-04-23

## 2024-04-23 NOTE — TELEPHONE ENCOUNTER
Patient called because she was interested in the SWL program. She has already completed the seminar, as well as the paperwork and was inquiring about her next steps. I advised she will need to send in the paperwork and we will call her back to get scheduled.

## 2024-04-24 NOTE — TELEPHONE ENCOUNTER
I have received the patient's new patient paperwork and I have called her to get scheduled. Patient is scheduled for 5/1/24 with Dr. Ross.

## 2024-04-30 ENCOUNTER — OFFICE VISIT (OUTPATIENT)
Age: 29
End: 2024-04-30
Payer: MEDICAID

## 2024-04-30 VITALS — BODY MASS INDEX: 48.82 KG/M2 | HEIGHT: 65 IN | WEIGHT: 293 LBS

## 2024-04-30 DIAGNOSIS — N93.9 ABNORMAL UTERINE BLEEDING (AUB): Primary | ICD-10-CM

## 2024-04-30 DIAGNOSIS — Z72.51 UNPROTECTED SEXUAL INTERCOURSE: ICD-10-CM

## 2024-04-30 DIAGNOSIS — N92.6 IRREGULAR PERIODS/MENSTRUAL CYCLES: ICD-10-CM

## 2024-04-30 LAB
HCG, PREGNANCY, URINE, POC: NEGATIVE
VALID INTERNAL CONTROL, POC: YES

## 2024-04-30 PROCEDURE — 81025 URINE PREGNANCY TEST: CPT | Performed by: OBSTETRICS & GYNECOLOGY

## 2024-04-30 PROCEDURE — 99203 OFFICE O/P NEW LOW 30 MIN: CPT | Performed by: OBSTETRICS & GYNECOLOGY

## 2024-04-30 NOTE — PROGRESS NOTES
Chief Complaint   Patient presents with    New Patient     Ob/Gyn Hx:  A3- 2 term pregnancies and 3 SABs.  LMP - Patient's last menstrual period was 2024.  Menses -  regular  Contraception - none.  Hx of STI - Yes - Chlamydia  SA - Yes - mal partner.    Health Maintenance:  Last Pap: last year- Normal per patient.     1. Have you been to the ER, urgent care clinic, or hospitalized since your last visit? This is the patients first visit with our practice.    2. Have you seen or consulted any other health care providers outside of the [facility] since your last visit?  This is the patients first visit with our practice.    Patient declines chaperone.    Joselyn Brandon MA  
Outpatient Medications   Medication Sig Dispense Refill    levothyroxine (SYNTHROID) 75 MCG tablet Take 1 tablet by mouth daily 30 tablet 5     No current facility-administered medications for this visit.       Allergies   Allergen Reactions    Trulicity [Dulaglutide] Nausea And Vomiting       Review of Systems - History obtained from the patient  Constitutional: negative for weight loss, fever, night sweats  HEENT: negative for hearing loss, earache, congestion, snoring, sorethroat  CV: negative for chest pain, palpitations, edema  Resp: negative for cough, shortness of breath, wheezing  GI: negative for change in bowel habits, abdominal pain, black or bloody stools  : negative for frequency, dysuria, hematuria, vaginal discharge  MSK: negative for back pain, joint pain, muscle pain  Breast: negative for breast lumps, nipple discharge, galactorrhea  Skin :negative for itching, rash, hives  Neuro: negative for dizziness, headache, confusion, weakness  Psych: negative for anxiety, depression, change in mood  Heme/lymph: negative for bleeding, bruising, pallor    Physical Exam    Ht 1.651 m (5' 5\")   Wt (!) 155.6 kg (343 lb)   LMP 03/28/2024 (Exact Date)   BMI 57.08 kg/m²       OBGyn Exam      Constitutional  Appearance: well-nourished, well developed, alert, in no acute distress    HENT  Head and Face: appears normal    Neck  Inspection/Palpation: normal appearance, no masses or tenderness  Thyroid: gland size normal, nontender    Chest  Respiratory Effort: non-labored breathing    Cardiovascular  Extremities: no peripheral edema    Gastrointestinal  Abdominal Examination: abdomen non-distended, non-tender to palpation, no masses present  Liver and spleen: no hepatomegaly present, spleen not palpable  Hernias: no hernias identified    Genitourinary  External Genitalia: normal appearance for age, no discharge present, no tenderness present, no inflammatory lesions present, no masses present, no atrophy

## 2024-05-01 ENCOUNTER — TELEPHONE (OUTPATIENT)
Age: 29
End: 2024-05-01

## 2024-05-01 ENCOUNTER — OFFICE VISIT (OUTPATIENT)
Age: 29
End: 2024-05-01
Payer: MEDICAID

## 2024-05-01 VITALS
HEIGHT: 65 IN | OXYGEN SATURATION: 97 % | RESPIRATION RATE: 20 BRPM | WEIGHT: 293 LBS | SYSTOLIC BLOOD PRESSURE: 124 MMHG | TEMPERATURE: 98.8 F | BODY MASS INDEX: 48.82 KG/M2 | DIASTOLIC BLOOD PRESSURE: 81 MMHG | HEART RATE: 90 BPM

## 2024-05-01 DIAGNOSIS — E46 MALNUTRITION, UNSPECIFIED TYPE (HCC): ICD-10-CM

## 2024-05-01 DIAGNOSIS — E66.01 MORBID OBESITY (HCC): Primary | ICD-10-CM

## 2024-05-01 PROBLEM — K21.9 GASTROESOPHAGEAL REFLUX DISEASE WITHOUT ESOPHAGITIS: Status: RESOLVED | Noted: 2021-03-08 | Resolved: 2024-05-01

## 2024-05-01 PROCEDURE — 99204 OFFICE O/P NEW MOD 45 MIN: CPT | Performed by: SURGERY

## 2024-05-01 ASSESSMENT — PATIENT HEALTH QUESTIONNAIRE - PHQ9
2. FEELING DOWN, DEPRESSED OR HOPELESS: NOT AT ALL
9. THOUGHTS THAT YOU WOULD BE BETTER OFF DEAD, OR OF HURTING YOURSELF: NOT AT ALL
5. POOR APPETITE OR OVEREATING: NOT AT ALL
6. FEELING BAD ABOUT YOURSELF - OR THAT YOU ARE A FAILURE OR HAVE LET YOURSELF OR YOUR FAMILY DOWN: NOT AT ALL
3. TROUBLE FALLING OR STAYING ASLEEP: NOT AT ALL
SUM OF ALL RESPONSES TO PHQ QUESTIONS 1-9: 3
4. FEELING TIRED OR HAVING LITTLE ENERGY: NEARLY EVERY DAY
7. TROUBLE CONCENTRATING ON THINGS, SUCH AS READING THE NEWSPAPER OR WATCHING TELEVISION: NOT AT ALL
SUM OF ALL RESPONSES TO PHQ QUESTIONS 1-9: 3
SUM OF ALL RESPONSES TO PHQ QUESTIONS 1-9: 3
8. MOVING OR SPEAKING SO SLOWLY THAT OTHER PEOPLE COULD HAVE NOTICED. OR THE OPPOSITE, BEING SO FIGETY OR RESTLESS THAT YOU HAVE BEEN MOVING AROUND A LOT MORE THAN USUAL: NOT AT ALL
10. IF YOU CHECKED OFF ANY PROBLEMS, HOW DIFFICULT HAVE THESE PROBLEMS MADE IT FOR YOU TO DO YOUR WORK, TAKE CARE OF THINGS AT HOME, OR GET ALONG WITH OTHER PEOPLE: NOT DIFFICULT AT ALL
SUM OF ALL RESPONSES TO PHQ9 QUESTIONS 1 & 2: 0
1. LITTLE INTEREST OR PLEASURE IN DOING THINGS: NOT AT ALL
SUM OF ALL RESPONSES TO PHQ QUESTIONS 1-9: 3

## 2024-05-01 NOTE — PROGRESS NOTES
Identified patient with two patient identifiers (name and ). Reviewed chart in preparation for visit and have obtained necessary documentation.    Cordelia Whitman is a 28 y.o. female  No chief complaint on file.    /81 (Site: Right Upper Arm, Position: Sitting, Cuff Size: Medium Adult)   Pulse 90   Temp 98.8 °F (37.1 °C) (Oral)   Resp 20   Ht 1.651 m (5' 5\")   Wt (!) 154.2 kg (340 lb)   LMP 2024 (Exact Date)   SpO2 97%   BMI 56.58 kg/m²     1. Have you been to the ER, urgent care clinic since your last visit?  Hospitalized since your last visit?no    2. Have you seen or consulted any other health care providers outside of the [facility] since your last visit?  Include any pap smears or colon screening. no

## 2024-05-01 NOTE — PROGRESS NOTES
Bariatric Surgery Consultation    CC: Obesity  Subjective:     The patient is a 28 y.o. obese  female with a Body mass index is 56.58 kg/m².. They have been considering surgery for some time now. The patient presents to the clinic today to discuss surgical weight loss options. They have made multiple attempts at weight loss over the years without success. They have tried diet and exercise. Also previously considered surgery at [facility]. Unfortunately, none of these have lead to meaningful, sustained weight loss. The patient now suffers from multiple medical conditions related to their obesity including joint pain, fatigue.  Relevant previous surgical history includes: c sections. They have attended the bariatric seminar before the visit. The patient's goals for the surgery include: lose weight and be more active.    Tobacco use: quit 2019  GERD/hiatal hernia: no  Contraceptive plan: Not currently on birth control.  Practicing abstinence  Sleep Apnea assessment: Previously tested and negative    Patient Active Problem List    Diagnosis Date Noted    Hidradenitis suppurativa 01/18/2023    Controlled type 2 diabetes mellitus without complication, without long-term current use of insulin ([facility]) 01/18/2023    Morbid obesity with BMI of 50.0-59.9, adult ([facility]) 12/23/2021    Anxiety 12/23/2021    Bipolar 2 disorder ([facility]) 12/23/2021    Plantar fasciitis 03/08/2021    Hepatic steatosis 03/08/2021    Overactive bladder 03/08/2021    Acquired hypothyroidism 03/08/2021    Primary insomnia 03/08/2021    Gastroesophageal reflux disease without esophagitis 03/08/2021    Pregnant 07/16/2014      Past Medical History:   Diagnosis Date    Acquired hypothyroidism 3/8/2021    Hearing disorder     Hepatic steatosis 3/8/2021    Overactive bladder Unknown    Pre-eclampsia [id number]    Psychiatric disorder     anxiety and depression since age 16    Psychiatric problem     depression     Past Surgical History:   Procedure Laterality Date

## 2024-05-01 NOTE — TELEPHONE ENCOUNTER
Imaging center called as they have scheduled the pt to have a pelvic US on the same day she is scheduled to see us with an US. The orders were originally placed by her PCP. She will not need the US with the imaging center correct? They are all scheduled for the same day.

## 2024-05-02 ENCOUNTER — CLINICAL DOCUMENTATION (OUTPATIENT)
Age: 29
End: 2024-05-02

## 2024-05-03 NOTE — PROGRESS NOTES
No chief complaint on file.      Ob/Gyn Hx:  A3- 2 term pregnancies and 3 SABs.  LMP - Patient's last menstrual period was 2024.  Menses -  regular  Contraception - none.  Hx of STI - Yes - Chlamydia  SA - Yes - male partner.    Health Maintenance:  Last Pap: last year- Normal per patient     1. Have you been to the ER, urgent care clinic, or hospitalized since your last visit?No    2. Have you seen or consulted any other health care providers outside of the [facility] since your last visit? No    Patient declines chaperone.    Joselyn Brandon MA

## 2024-05-06 NOTE — PROGRESS NOTES
Problem Visit  Chief Complaint   Patient presents with    Ultrasound    Fibroids       Cordelia Whitman is a 28 y.o.  presenting for problem visit. Her main concern today is follow up ultrasound and concern for fibroids.     She was last seen on 24 for a problem visit  Per OV \"Her main concern today is irregular cycles. She notes that she underwent menarche at age 7. She has previously had regular menstrual periods however notes that they vary in terms of the amount of bleeding. She had 2 previous pregnancies. Her first delivery was vaginal. Her second delivery was via . She delivered at [facility]. She had a Nexplanon placed 4 years. She notes no bleeding with the Nexplanon in place. She has previously tried OCPs however got pregnant on OCPs. She also notes weight gain on the Nexplanon and after childbirth. She reports that after getting her Nexplanon out, her periods were more regular. She did start bleeding  and has been bleeding continuously ever since. She is currently on Synthroid. Medication was recently increased due to elevated TSH. Her medical history is also significant for obesity with a BMI of 57.\"    At that visit, we discussed causes of irregular bleeding including structural vs non structural anomalies. She previously declined contraceptive options.    She follows up today for ultrasound.     Ultrasound 2024  TV ULTRASOUND THE UTERUS IS ANTEVERTED, NORMAL IN SIZE, AND HETEROGENEOUS IN ECHOGENICITY. THE ENDOMETRIUM MEASURES 6.11 MM IN THICKNESS. NO MASSES OR ABNORMALITIES ARE SEEN. RIGHT OVARY APPEARS WNL. LEFT OVARY APPEARS WNL. NO FREE FLUID IS SEEN IN THE CDS    She notes that her bleeding (continuous since ) slowed down over this weekend. She notes that she is still spotting but no longer having heavy bleeding.    Past Medical History:   Diagnosis Date    Acquired hypothyroidism 3/8/2021    Hearing disorder     Hepatic steatosis 3/8/2021    Overactive

## 2024-05-07 ENCOUNTER — OFFICE VISIT (OUTPATIENT)
Age: 29
End: 2024-05-07
Payer: MEDICAID

## 2024-05-07 VITALS — BODY MASS INDEX: 56.58 KG/M2 | WEIGHT: 293 LBS | DIASTOLIC BLOOD PRESSURE: 86 MMHG | SYSTOLIC BLOOD PRESSURE: 132 MMHG

## 2024-05-07 DIAGNOSIS — N93.9 ABNORMAL UTERINE BLEEDING (AUB): Primary | ICD-10-CM

## 2024-05-07 PROCEDURE — 99213 OFFICE O/P EST LOW 20 MIN: CPT | Performed by: OBSTETRICS & GYNECOLOGY

## 2024-05-24 ENCOUNTER — OFFICE VISIT (OUTPATIENT)
Age: 29
End: 2024-05-24

## 2024-05-24 DIAGNOSIS — E66.01 MORBID OBESITY (HCC): Primary | ICD-10-CM

## 2024-05-24 NOTE — PROGRESS NOTES
chicken/boiled eggs   Dinner  Stuffed peppers with cauliflower rice   Snacks  Cheese and boiled egg, meat stick    Beverages  1 cup coffee, water only, occasional sips of juice    Minimal eating out     Environment/Psychosocial/Support: Pt reports good support system through partner, family, Jew family. Pt has 4 kids (ages 10, 8, 7 and 5). 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 Pt's mother in law had gastric bypass. Pt does all of the grocery shopping and cooking. Today's appointment was somewhat distracted with patient attending an event with her children. Emphasized the need to have a quiet and undistracted location for future phone appointments or can complete visits in the office.     NUTRITION DIAGNOSIS:  Food and nutrition related knowledge deficit r/t lack of prior exposure to information evidenced by pt reports previous nutrition education for gastric bypass. Pt now seeking approval for duodenal switch.       NUTRITION INTERVENTION:  Pt educated on nutrition recommendations for weight loss surgery, specifically duodenal switch.    Instructed on consuming 3 meals per day starting now.  Use the balanced plate method to plan meals, include 3 oz of lean source of protein, 1/2 cup whole grains, unlimited non-starchy vegetables, 1/2 cup fruit and 1 serving of low fat dairy. Utilize handouts listing healthy snack and meal ideas to limit restaurant meals.      After surgery measure all meals to 1/2 cup. Each meal will contain a 1/4 cup lean protein and 1/4 cup fruit, non-starchy vegetable or starch (limiting to once per day). Aim for 60 g protein per day. Sip on 48-64 oz of sugar free, calorie free, non-carbonated beverages each day. Do not use a straw. Do not consume beverages 30 minutes before, during or 30 minutes after meals.     Read all nutrition labels. Demonstrated and emphasized identifying serving size, total fat, sugar and protein content. Defined low fat as </= 3 g per serving. Discussed

## 2024-05-31 ENCOUNTER — HOSPITAL ENCOUNTER (OUTPATIENT)
Facility: HOSPITAL | Age: 29
End: 2024-05-31
Attending: SURGERY
Payer: MEDICAID

## 2024-05-31 DIAGNOSIS — E66.01 MORBID OBESITY (HCC): ICD-10-CM

## 2024-05-31 PROCEDURE — 74240 X-RAY XM UPR GI TRC 1CNTRST: CPT

## 2024-06-10 ENCOUNTER — TELEPHONE (OUTPATIENT)
Age: 29
End: 2024-06-10

## 2024-06-10 ENCOUNTER — CLINICAL DOCUMENTATION (OUTPATIENT)
Age: 29
End: 2024-06-10

## 2024-06-10 NOTE — TELEPHONE ENCOUNTER
Identified patient with two patient identifiers (name and ). Reviewed chart in preparation for encounter and have obtained necessary documentation.     Called and spoke with patient regarding [facility] insurance requirements. Patient has all appts needed scheduled. Informed pt I would fax over her PCP support from to PCP verified on file. Patient understood what was discussed and thankful for the call.

## 2024-06-13 ENCOUNTER — TELEPHONE (OUTPATIENT)
Age: 29
End: 2024-06-13

## 2024-06-21 ENCOUNTER — OFFICE VISIT (OUTPATIENT)
Age: 29
End: 2024-06-21

## 2024-06-21 DIAGNOSIS — E66.01 MORBID OBESITY (HCC): Primary | ICD-10-CM

## 2024-06-21 NOTE — PROGRESS NOTES
[facility]  Supervised Weight Loss     Date:   2024    Patient's Name: Cordelia Whitman  : 1995    Insurance:  [facility]            Session: 2   6  Surgery: Duodenal Switch  Surgeon:  Neal Ross M.D.     Height: 65\"  Reported Weight:    339      Lbs.   BMI: 56   Pounds Lost since last month: 1#               Pounds Gained since last month: 0    Starting Weight: 340#   Previous Month’s Weight: 340#  Overall Pounds Lost: 1#  Overall Pounds Gained: 0    Other Pertinent Information: Today's appointment was completed over the phone.     Smoking Status:  none  Alcohol Intake: none    I have reviewed with pt the guidelines of the supervised wt loss program.  Pt understands the expectations of some wt loss during the program and that wt gain could delay the process. I have also explained that classes need to be consecutive.  Missing a class may result in starting over. Pt has received this information in writing.          Changes that patient has made since last month include:  maintaining previously made changes.      Eating Habits and Behaviors  General healthy eating guidelines were discussed. A nutrition lesson specific to the importance of protein intake after surgery was provided. We discussed food sources of protein, protein supplements and multiple reasons as to why protein is important after bariatric surgery.  Pts were instructed to focus on including protein at every meal and practice eating protein first at the meal. Pts were encouraged to sample a protein shake for tolerance. Patients were also instructed to use the balanced plate method for help with portion control and general healthy eating prior to surgery. We discussed measuring meals to 1/2 cup total per meal after surgery. Drinking only calorie-free, sugar-free and non-carbonated beverages. We discussed the importance of drinking 64 ounces of fluid per day to prevent dehydration

## 2024-07-02 ENCOUNTER — OFFICE VISIT (OUTPATIENT)
Age: 29
End: 2024-07-02
Payer: MEDICAID

## 2024-07-02 VITALS
DIASTOLIC BLOOD PRESSURE: 73 MMHG | RESPIRATION RATE: 20 BRPM | SYSTOLIC BLOOD PRESSURE: 125 MMHG | WEIGHT: 293 LBS | OXYGEN SATURATION: 95 % | HEART RATE: 91 BPM | BODY MASS INDEX: 48.82 KG/M2 | HEIGHT: 65 IN | TEMPERATURE: 98.3 F

## 2024-07-02 DIAGNOSIS — E66.01 MORBID OBESITY (HCC): Primary | ICD-10-CM

## 2024-07-02 PROCEDURE — 99213 OFFICE O/P EST LOW 20 MIN: CPT | Performed by: SURGERY

## 2024-07-02 RX ORDER — ERGOCALCIFEROL 1.25 MG/1
50000 CAPSULE ORAL WEEKLY
COMMUNITY
Start: 2024-06-24

## 2024-07-02 RX ORDER — FLUOXETINE HYDROCHLORIDE 40 MG/1
CAPSULE ORAL DAILY
COMMUNITY
Start: 2024-06-24

## 2024-07-02 ASSESSMENT — PATIENT HEALTH QUESTIONNAIRE - PHQ9
SUM OF ALL RESPONSES TO PHQ QUESTIONS 1-9: 0
SUM OF ALL RESPONSES TO PHQ9 QUESTIONS 1 & 2: 0
SUM OF ALL RESPONSES TO PHQ QUESTIONS 1-9: 0
SUM OF ALL RESPONSES TO PHQ QUESTIONS 1-9: 0
2. FEELING DOWN, DEPRESSED OR HOPELESS: NOT AT ALL
SUM OF ALL RESPONSES TO PHQ QUESTIONS 1-9: 0
1. LITTLE INTEREST OR PLEASURE IN DOING THINGS: NOT AT ALL

## 2024-07-02 NOTE — H&P (VIEW-ONLY)
Surgery Progress Note    7/2/2024    CC: Visit 1/3    Subjective:     Patient here for her first insurance visit.  She has lost a few pounds.  She has completed 2 of her dietary visits.  She has been seen by psych.  Labs done yet.  Upper GI showed some reflux.  Still thinking about NATALIA versus bypass.  She is using a Rippel device which is a 9 nicotine-based device.    Constitutional: No fever or chills  Neurologic: No headache  Eyes: No scleral icterus or irritated eyes  Nose: No nasal pain or drainage  Mouth: No oral lesions or sore throat  Cardiac: No palpations or chest pain  Pulmonary: No cough or shortness of breath  Gastrointestinal: No nausea, emesis, diarrhea, or constipation  Genitourinary: No dysuria  Musculoskeletal: No muscle or joint tenderness  Skin: No rashes or lesions  Psychiatric: No anxiety or depressed mood    Objective:     Vitals:    07/02/24 1415   BP: 125/73   Pulse: 91   Resp: 20   Temp: 98.3 °F (36.8 °C)   SpO2: 95%     Body mass index is 56.25 kg/m².  Wt 338 lbs    General: No acute distress, conversant  Eyes: PERRLA, no scleral icterus  HENT: Normocephalic without oral lesions  Neck: Trachea midline without LAD  Cardiac: Normal pulse rate and rhythm  Pulmonary: Symmetric chest rise with normal effort  GI: Soft, morbidly obese, NT, ND, no hernia, no splenomegaly  Skin: Warm without rash  Extremities: No edema or joint stiffness  Psych: Appropriate mood and affect    Assessment:     28-year-old female being worked up for NATALIA versus switch with some GERD on her upper GI    Plan:     Plan for EGD to further evaluate GERD.  Recommend against NATALIA versus switch if this is evident on EGD.  Not on PPI.  Back on her medication for bipolar.  Being followed by psych.  I will see her back in 1 month.  Suggested she get labs done soon.      Neal Ross MD, FACS, [facility]  Bariatric and General Surgeon  [facility]

## 2024-07-02 NOTE — PROGRESS NOTES
Surgery Progress Note    7/2/2024    CC: Visit 1/3    Subjective:     Patient here for her first insurance visit.  She has lost a few pounds.  She has completed 2 of her dietary visits.  She has been seen by psych.  Labs done yet.  Upper GI showed some reflux.  Still thinking about NATALIA versus bypass.  She is using a Rippel device which is a 9 nicotine-based device.    Constitutional: No fever or chills  Neurologic: No headache  Eyes: No scleral icterus or irritated eyes  Nose: No nasal pain or drainage  Mouth: No oral lesions or sore throat  Cardiac: No palpations or chest pain  Pulmonary: No cough or shortness of breath  Gastrointestinal: No nausea, emesis, diarrhea, or constipation  Genitourinary: No dysuria  Musculoskeletal: No muscle or joint tenderness  Skin: No rashes or lesions  Psychiatric: No anxiety or depressed mood    Objective:     Vitals:    07/02/24 1415   BP: 125/73   Pulse: 91   Resp: 20   Temp: 98.3 °F (36.8 °C)   SpO2: 95%     Body mass index is 56.25 kg/m².  Wt 338 lbs    General: No acute distress, conversant  Eyes: PERRLA, no scleral icterus  HENT: Normocephalic without oral lesions  Neck: Trachea midline without LAD  Cardiac: Normal pulse rate and rhythm  Pulmonary: Symmetric chest rise with normal effort  GI: Soft, morbidly obese, NT, ND, no hernia, no splenomegaly  Skin: Warm without rash  Extremities: No edema or joint stiffness  Psych: Appropriate mood and affect    Assessment:     28-year-old female being worked up for NATALIA versus switch with some GERD on her upper GI    Plan:     Plan for EGD to further evaluate GERD.  Recommend against NATALIA versus switch if this is evident on EGD.  Not on PPI.  Back on her medication for bipolar.  Being followed by psych.  I will see her back in 1 month.  Suggested she get labs done soon.      Neal Ross MD, FACS, [facility]  Bariatric and General Surgeon  [facility]

## 2024-07-02 NOTE — PROGRESS NOTES
Identified patient with two patient identifiers (name and ). Reviewed chart in preparation for visit and have obtained necessary documentation.    Cordelia Whitman is a 28 y.o. female  Chief Complaint   Patient presents with    Follow-up     Bariatric [facility] visit 1 of 3     /73 (Site: Left Upper Arm, Position: Sitting, Cuff Size: Medium Adult)   Pulse 91   Temp 98.3 °F (36.8 °C) (Oral)   Resp 20   Ht 1.651 m (5' 5\")   Wt (!) 153.3 kg (338 lb)   SpO2 95%   BMI 56.25 kg/m²     1. Have you been to the ER, urgent care clinic since your last visit?  Hospitalized since your last visit?no    2. Have you seen or consulted any other health care providers outside of the [facility] since your last visit?  Include any pap smears or colon screening. no

## 2024-07-03 ENCOUNTER — TELEPHONE (OUTPATIENT)
Age: 29
End: 2024-07-03

## 2024-07-03 ENCOUNTER — PREP FOR PROCEDURE (OUTPATIENT)
Age: 29
End: 2024-07-03

## 2024-07-03 PROBLEM — K21.9 ESOPHAGEAL REFLUX: Status: ACTIVE | Noted: 2024-07-03

## 2024-07-03 NOTE — TELEPHONE ENCOUNTER
Spoke to patient to schedule her EGD with Dr Ross at [facility].  I offered 7/22 @ 9:30am with arrival time of 8:00am and she accepted.    I let the patient know they are required to bring someone with them that will be responsible to take them home along with their photo ID and insurance card.      If you are taking any weight lose medication, you need to stop one week before procedure                            Trulicity (dulaglutide)                          Wegovy (Semaglutide)                          Ozempic (Semaglutide)                          Rybelsus (tirzepatide)                          Mounjaro (tirzepatide)                           Zepbound (tirzepatide)        I let them know once this is scheduled I will put the information in a letter along with where they need to go and pre-procedure instructions.   This letter will post to their my chart and go out in the mail.  She acknowledged thank you

## 2024-07-17 RX ORDER — CLINDAMYCIN PHOSPHATE 10 MG/G
GEL TOPICAL
COMMUNITY
Start: 2024-06-24

## 2024-07-17 RX ORDER — LEVOTHYROXINE SODIUM 0.1 MG/1
100 TABLET ORAL DAILY
COMMUNITY

## 2024-07-17 RX ORDER — ATORVASTATIN CALCIUM 20 MG/1
20 TABLET, FILM COATED ORAL
COMMUNITY

## 2024-07-17 RX ORDER — FLUCONAZOLE 150 MG/1
TABLET ORAL
COMMUNITY
Start: 2024-06-24

## 2024-07-17 RX ORDER — NYSTATIN 100000 [USP'U]/G
POWDER TOPICAL
COMMUNITY
Start: 2024-06-24

## 2024-07-17 NOTE — FLOWSHEET NOTE
lot Monday - Friday 8a - 11am. There are no Saturday or Sunday swabbing at any [facility] facility. (patient verbalizes understanding) not applicable

## 2024-07-22 ENCOUNTER — HOSPITAL ENCOUNTER (OUTPATIENT)
Facility: HOSPITAL | Age: 29
Setting detail: OUTPATIENT SURGERY
Discharge: HOME | End: 2024-07-22
Attending: SURGERY | Admitting: SURGERY
Payer: MEDICAID

## 2024-07-22 ENCOUNTER — ANESTHESIA (OUTPATIENT)
Facility: HOSPITAL | Age: 29
End: 2024-07-22
Payer: MEDICAID

## 2024-07-22 ENCOUNTER — ANESTHESIA EVENT (OUTPATIENT)
Facility: HOSPITAL | Age: 29
End: 2024-07-22
Payer: MEDICAID

## 2024-07-22 VITALS
WEIGHT: 293 LBS | HEART RATE: 68 BPM | OXYGEN SATURATION: 96 % | BODY MASS INDEX: 48.82 KG/M2 | HEIGHT: 65 IN | DIASTOLIC BLOOD PRESSURE: 80 MMHG | SYSTOLIC BLOOD PRESSURE: 133 MMHG | TEMPERATURE: 98 F | RESPIRATION RATE: 16 BRPM

## 2024-07-22 LAB
HCG UR QL: NEGATIVE
HELIOBACTER PYLORI ID: NEGATIVE

## 2024-07-22 PROCEDURE — 88305 TISSUE EXAM BY PATHOLOGIST: CPT

## 2024-07-22 PROCEDURE — 81025 URINE PREGNANCY TEST: CPT

## 2024-07-22 PROCEDURE — 2709999900 HC NON-CHARGEABLE SUPPLY: Performed by: SURGERY

## 2024-07-22 PROCEDURE — 43239 EGD BIOPSY SINGLE/MULTIPLE: CPT | Performed by: SURGERY

## 2024-07-22 PROCEDURE — 3700000000 HC ANESTHESIA ATTENDED CARE: Performed by: SURGERY

## 2024-07-22 PROCEDURE — 3700000001 HC ADD 15 MINUTES (ANESTHESIA): Performed by: SURGERY

## 2024-07-22 PROCEDURE — 2580000003 HC RX 258: Performed by: NURSE ANESTHETIST, CERTIFIED REGISTERED

## 2024-07-22 PROCEDURE — 2500000003 HC RX 250 WO HCPCS: Performed by: NURSE ANESTHETIST, CERTIFIED REGISTERED

## 2024-07-22 PROCEDURE — 3600007512: Performed by: SURGERY

## 2024-07-22 PROCEDURE — 6360000002 HC RX W HCPCS: Performed by: NURSE ANESTHETIST, CERTIFIED REGISTERED

## 2024-07-22 PROCEDURE — 7100000011 HC PHASE II RECOVERY - ADDTL 15 MIN: Performed by: SURGERY

## 2024-07-22 PROCEDURE — 88313 SPECIAL STAINS GROUP 2: CPT

## 2024-07-22 PROCEDURE — 3600007502: Performed by: SURGERY

## 2024-07-22 PROCEDURE — 7100000010 HC PHASE II RECOVERY - FIRST 15 MIN: Performed by: SURGERY

## 2024-07-22 RX ORDER — SODIUM CHLORIDE 9 MG/ML
25 INJECTION, SOLUTION INTRAVENOUS PRN
Status: DISCONTINUED | OUTPATIENT
Start: 2024-07-22 | End: 2024-07-22 | Stop reason: HOSPADM

## 2024-07-22 RX ORDER — SODIUM CHLORIDE 9 MG/ML
INJECTION, SOLUTION INTRAVENOUS CONTINUOUS PRN
Status: DISCONTINUED | OUTPATIENT
Start: 2024-07-22 | End: 2024-07-22 | Stop reason: SDUPTHER

## 2024-07-22 RX ORDER — SODIUM CHLORIDE 0.9 % (FLUSH) 0.9 %
5-40 SYRINGE (ML) INJECTION PRN
Status: DISCONTINUED | OUTPATIENT
Start: 2024-07-22 | End: 2024-07-22 | Stop reason: HOSPADM

## 2024-07-22 RX ORDER — OMEPRAZOLE 40 MG/1
40 CAPSULE, DELAYED RELEASE ORAL
Qty: 90 CAPSULE | Refills: 3 | Status: SHIPPED | OUTPATIENT
Start: 2024-07-22

## 2024-07-22 RX ORDER — DEXMEDETOMIDINE HYDROCHLORIDE 100 UG/ML
INJECTION, SOLUTION INTRAVENOUS PRN
Status: DISCONTINUED | OUTPATIENT
Start: 2024-07-22 | End: 2024-07-22 | Stop reason: SDUPTHER

## 2024-07-22 RX ORDER — SODIUM CHLORIDE 0.9 % (FLUSH) 0.9 %
5-40 SYRINGE (ML) INJECTION EVERY 12 HOURS SCHEDULED
Status: DISCONTINUED | OUTPATIENT
Start: 2024-07-22 | End: 2024-07-22 | Stop reason: HOSPADM

## 2024-07-22 RX ORDER — LIDOCAINE HYDROCHLORIDE 20 MG/ML
INJECTION, SOLUTION EPIDURAL; INFILTRATION; INTRACAUDAL; PERINEURAL PRN
Status: DISCONTINUED | OUTPATIENT
Start: 2024-07-22 | End: 2024-07-22 | Stop reason: SDUPTHER

## 2024-07-22 RX ORDER — PROPOFOL 10 MG/ML
INJECTION, EMULSION INTRAVENOUS PRN
Status: DISCONTINUED | OUTPATIENT
Start: 2024-07-22 | End: 2024-07-22 | Stop reason: SDUPTHER

## 2024-07-22 RX ADMIN — SODIUM CHLORIDE: 9 INJECTION, SOLUTION INTRAVENOUS at 08:32

## 2024-07-22 RX ADMIN — PROPOFOL 200 MCG/KG/MIN: 10 INJECTION, EMULSION INTRAVENOUS at 08:39

## 2024-07-22 RX ADMIN — PROPOFOL 30 MG: 10 INJECTION, EMULSION INTRAVENOUS at 08:40

## 2024-07-22 RX ADMIN — LIDOCAINE HYDROCHLORIDE 100 MG: 20 INJECTION, SOLUTION EPIDURAL; INFILTRATION; INTRACAUDAL at 08:38

## 2024-07-22 RX ADMIN — DEXMEDETOMIDINE 6 MCG: 100 INJECTION, SOLUTION INTRAVENOUS at 08:35

## 2024-07-22 RX ADMIN — PROPOFOL 30 MG: 10 INJECTION, EMULSION INTRAVENOUS at 08:38

## 2024-07-22 NOTE — DISCHARGE INSTRUCTIONS
Discharge Instructions for Endoscopy Patients       Diagnosis: GERD      Plan: Start PPI      Do not drive or operate machinery while taking sedating or narcotic medications.     Some discomfort is expected in your throat or upper abdomen. Take tylenol as needed.    If an acid monitoring device was deployed, please follow the instructions for recording and returning the device.    You may walk as desired and go up and down stairs as needed. Walking is encouraged.    You may shower like normal    You may resume regular diet.    Follow up with provider as scheduled.    If you experience fever (greater than 101.5), chills, vomiting or redness or drainage at surgical site, please contact your surgeon’s office.    If you have further questions or concerns, please call your surgeon’s office at [phone].

## 2024-07-22 NOTE — PERIOP NOTE
Endoscope was pre-cleaned at bedside immediately following procedure by Will. Assumed pt care taken to recovery via stretcher for further monitoring please see CRNA chart sedation/monitoring for procedures pt tolerated well.

## 2024-07-22 NOTE — ANESTHESIA PRE PROCEDURE
Department of Anesthesiology  Preprocedure Note       Name:  Cordelia Whitman   Age:  28 y.o.  :  1995                                          MRN:  [id number]         Date:  2024      Surgeon: Surgeon(s):  Neal Ross MD    Procedure: Procedure(s):  ESOPHAGOGASTRODUODENOSCOPY WITH BIOPSY    Medications prior to admission:   Prior to Admission medications    Medication Sig Start Date End Date Taking? Authorizing Provider   atorvastatin (LIPITOR) 20 MG tablet Take 1 tablet by mouth nightly   Yes Obed Morales MD   levothyroxine (SYNTHROID) 100 MCG tablet Take 1 tablet by mouth Daily   Yes Obed Morales MD   clindamycin 1 % gel APPLY TO AFFECTED AREAS DAILY AS NEEDED 24  Yes Obed Morales MD   fluconazole (DIFLUCAN) 150 MG tablet TAKE 1 TABLET BY MOUTH 1 TIME PER WEEK FOR 52 DOSES.-1 MONTH PER FILL PER INS 24  Yes Obed Morales MD   nystatin (MYCOSTATIN) 487003 UNIT/GM powder APPLY TO AREAS OF RASH ONCE DAILY AS NEEDED. 24  Yes Obed Morales MD   vitamin D (ERGOCALCIFEROL) 1.25 MG (85897 UT) CAPS capsule Take 1 capsule by mouth Once a week at 5 PM 24   Obed Morales MD   FLUoxetine (PROZAC) 40 MG capsule Take 1 capsule by mouth daily 24   Obed Morales MD       Current medications:    Current Facility-Administered Medications   Medication Dose Route Frequency Provider Last Rate Last Admin    sodium chloride flush 0.9 % injection 5-40 mL  5-40 mL IntraVENous 2 times per day Neal Ross MD        sodium chloride flush 0.9 % injection 5-40 mL  5-40 mL IntraVENous PRN Neal Ross MD        0.9 % sodium chloride infusion  25 mL IntraVENous PRN Neal Ross MD           Allergies:    Allergies   Allergen Reactions    Trulicity [Dulaglutide] Nausea And Vomiting       Problem List:    Patient Active Problem List   Diagnosis Code    Plantar fasciitis M72.2    Hepatic steatosis K76.0    Morbid obesity with BMI of

## 2024-07-22 NOTE — OP NOTE
JOCELYN PERERA   Endoscopic Procedure Note        NAME:  Cordelia Whitman   :   1995   MRN:   [id number]     Date/Time:  2024 8:49 AM    Esophagogastroduodenoscopy (EGD) Procedure Note    Preoperative Diagnosis: Esophageal reflux [K21.9]  Postoperative Diagnosis: Post-Op Diagnosis Codes:     * Esophageal reflux [K21.9]       Surgeon:  Neal Ross MD    Staff: Circulator: Gabbie Guzman RN  Endoscopy Technician: Gallito Viera     Implants: None    Anethesia/Sedation:  MAC anesthesia Propofol    Procedure Details     After infom consent was obtained for the procedure, with all risks and benefits of procedure explained the patient was taken to the endoscopy suite and placed in the left lateral decubitus position.  Following sequential administration of sedation as per above, the GIF-H190 gastroscope was inserted into the mouth and advanced under direct vision to second portion of the duodenum.  A careful inspection was made as the gastroscope was withdrawn, including a retroflexed view of the proximal stomach; findings and interventions are described below.      Findings:  Esophagus: irregular 2 cm, four quadrant tongues of pink tissue in distal esophagus, GEJ at 38 cm, no hiatal hernia  Stomach: normal, Hill Grade 2 valve  Duodenum/jejunum: Normal       Therapies: none    Specimens: Cold bx antrum for H pylori, Cold bx two areas of distal esophageal irregular tissue           EBL: Minimal    Complications:   None; patient tolerated the procedure well.           Impression:    See Postoperative diagnosis above    Recommendations:  Start PPI    Discharge disposition:  Home in the company of  when able to ambulate    Neal Ross MD, [facility], [facility]  Bariatric and General Surgeon  [facility]

## 2024-07-22 NOTE — ANESTHESIA POSTPROCEDURE EVALUATION
Department of Anesthesiology  Postprocedure Note    Patient: Cordelia Whitman  MRN: [id number]  YOB: 1995  Date of evaluation: 7/22/2024    Procedure Summary       Date: 07/22/24 Room / Location: [facility] 03 / [facility] ENDOSCOPY    Anesthesia Start: 0832 Anesthesia Stop: 0851    Procedures:       ESOPHAGOGASTRODUODENOSCOPY WITH BIOPSY (Upper GI Region)      ESOPHAGOSCOPY BIOPSY (Upper GI Region) Diagnosis:       Esophageal reflux      (Esophageal reflux [K21.9])    Surgeons: Neal Ross MD Responsible Provider: Hansel Jones MD    Anesthesia Type: MAC ASA Status: 3            Anesthesia Type: No value filed.    Jaime Phase I: Jaime Score: 10    Jaime Phase II: Jaime Score: 8    Anesthesia Post Evaluation    Patient location during evaluation: PACU  Patient participation: complete - patient participated  Level of consciousness: awake  Pain score: 0  Airway patency: patent  Nausea & Vomiting: no nausea and no vomiting  Cardiovascular status: blood pressure returned to baseline  Respiratory status: acceptable  Hydration status: euvolemic  Pain management: adequate    No notable events documented.

## 2024-07-22 NOTE — INTERVAL H&P NOTE
Update History & Physical    The patient's History and Physical of 7/2/24 was reviewed with the patient and I examined the patient. There was no change. The surgical site was confirmed by the patient and me.     Plan: The risks, benefits, expected outcome, and alternative to the recommended procedure have been discussed with the patient. Patient understands and wants to proceed with the procedure.     Electronically signed by Neal Ross MD on 7/22/2024 at 8:04 AM

## 2024-07-26 NOTE — PROGRESS NOTES
Pathology showed Barretts without dysplasia. I called pt. Plan for bypass only for her. She understands. Continue dietary sessions.    Neal Ross MD, FACS, [facility]  Bariatric and General Surgeon  [facility]

## 2024-07-31 ENCOUNTER — OFFICE VISIT (OUTPATIENT)
Age: 29
End: 2024-07-31

## 2024-07-31 DIAGNOSIS — E66.01 MORBID OBESITY (HCC): Primary | ICD-10-CM

## 2024-07-31 NOTE — PROGRESS NOTES
[facility] at [facility]  Supervised Weight Loss     Date:   2024    Patient's Name: Cordelia Whitman  : 1995    Insurance:  [facility]                          Session: 3 of  6  Surgery: Gastric Bypass                 Surgeon:  Neal Ross M.D.      Height: 65\"                 Reported Weight:    336      Lbs.                              BMI: 55             Pounds Lost since last month: 3#               Pounds Gained since last month: 0     Starting Weight: 340#                       Previous Month’s Weight: 339#  Overall Pounds Lost: 4#                  Overall Pounds Gained: 0     Other Pertinent Information: Today's appointment was completed over the phone. Pt recommended to lose 16# prior to surgery approval. Currently taking vitamin D3.     Smoking Status:  none  Alcohol Intake: none    I have reviewed with pt the guidelines of the supervised wt loss program.  Pt understands the expectations of some wt loss during the program and that wt gain could delay the process. I have also explained that appointments need to be consecutive and missing an appointment may result in starting over. Pt has received this information in writing.          Changes that patient has made since last month include:  practicing 30/30 drinking rule, researching bariatric recipes.      Eating Habits and Behaviors  A nutrition lesson specific to vitamins was provided. We discussed the various reasons for needing vitamins and different types and doses. General healthy eating guidelines were also discussed. Pts were instructed that their plate should be made up 1/2 plate coming from non-starchy vegetables, 1/4 coming from lean meat, and 1/4 of their plate coming from carbohydrates, including fruits, starches, or milk.  We discussed measuring meals to 1/2 cup total per meal after surgery. Drinking only calorie-free, sugar-free and non-carbonated beverages. We discussed the importance of

## 2024-08-02 ENCOUNTER — OFFICE VISIT (OUTPATIENT)
Age: 29
End: 2024-08-02
Payer: MEDICAID

## 2024-08-02 VITALS
TEMPERATURE: 98.2 F | BODY MASS INDEX: 48.82 KG/M2 | HEIGHT: 65 IN | WEIGHT: 293 LBS | DIASTOLIC BLOOD PRESSURE: 79 MMHG | SYSTOLIC BLOOD PRESSURE: 131 MMHG | OXYGEN SATURATION: 96 % | RESPIRATION RATE: 20 BRPM | HEART RATE: 87 BPM

## 2024-08-02 DIAGNOSIS — E66.01 MORBID OBESITY (HCC): Primary | ICD-10-CM

## 2024-08-02 PROCEDURE — 99213 OFFICE O/P EST LOW 20 MIN: CPT | Performed by: SURGERY

## 2024-08-02 RX ORDER — OMEPRAZOLE 40 MG/1
40 CAPSULE, DELAYED RELEASE ORAL
Qty: 90 CAPSULE | Refills: 3 | Status: SHIPPED | OUTPATIENT
Start: 2024-08-02

## 2024-08-02 ASSESSMENT — PATIENT HEALTH QUESTIONNAIRE - PHQ9
SUM OF ALL RESPONSES TO PHQ QUESTIONS 1-9: 0
2. FEELING DOWN, DEPRESSED OR HOPELESS: NOT AT ALL
SUM OF ALL RESPONSES TO PHQ QUESTIONS 1-9: 0
SUM OF ALL RESPONSES TO PHQ9 QUESTIONS 1 & 2: 0
1. LITTLE INTEREST OR PLEASURE IN DOING THINGS: NOT AT ALL

## 2024-08-02 NOTE — PROGRESS NOTES
Identified patient with two patient identifiers (name and ). Reviewed chart in preparation for visit and have obtained necessary documentation.    Cordelia Whitman is a 28 y.o. female  Chief Complaint   Patient presents with    Follow-up     Bariatric [facility] Visit 2 of 3     /79 (Site: Right Upper Arm, Position: Sitting, Cuff Size: Medium Adult)   Pulse 87   Temp 98.2 °F (36.8 °C) (Oral)   Resp 20   Ht 1.651 m (5' 5\")   Wt (!) 152 kg (335 lb)   SpO2 96%   BMI 55.75 kg/m²     1. Have you been to the ER, urgent care clinic since your last visit?  Hospitalized since your last visit?no    2. Have you seen or consulted any other health care providers outside of the [facility] since your last visit?  Include any pap smears or colon screening. no

## 2024-08-02 NOTE — PROGRESS NOTES
Surgery Progress Note    8/2/2024    CC: Visit 2/3    Subjective:     Since last visit underwent endoscopy.  Not taking her PPI.  No GERD symptoms.  But was found to have Rnee's.  3 pounds down.    Constitutional: No fever or chills  Neurologic: No headache  Eyes: No scleral icterus or irritated eyes  Nose: No nasal pain or drainage  Mouth: No oral lesions or sore throat  Cardiac: No palpations or chest pain  Pulmonary: No cough or shortness of breath  Gastrointestinal: No nausea, emesis, diarrhea, or constipation  Genitourinary: No dysuria  Musculoskeletal: No muscle or joint tenderness  Skin: No rashes or lesions  Psychiatric: No anxiety or depressed mood    Objective:     Vitals:    08/02/24 1157   BP: 131/79   Pulse: 87   Resp: 20   Temp: 98.2 °F (36.8 °C)   SpO2: 96%     Body mass index is 55.75 kg/m².  Wt 335 lbs    General: No acute distress, conversant  Eyes: PERRLA, no scleral icterus  HENT: Normocephalic without oral lesions  Neck: Trachea midline without LAD  Cardiac: Normal pulse rate and rhythm  Pulmonary: Symmetric chest rise with normal effort  GI: Soft, morbid obesity, NT, ND, no hernia, no splenomegaly  Skin: Warm without rash  Extremities: No edema or joint stiffness  Psych: Appropriate mood and affect    Assessment:     28-year-old female preparing for gastric bypass visit 2/3    Plan:     She needs to take her PPI.  I reordered this for her.  Next visit we will sign consents.  Continue dietary visits.  Continue diet.      Neal Ross MD, FACS, [facility]  Bariatric and General Surgeon  [facility]

## 2024-08-28 ENCOUNTER — OFFICE VISIT (OUTPATIENT)
Age: 29
End: 2024-08-28

## 2024-08-28 DIAGNOSIS — E66.01 MORBID OBESITY (HCC): Primary | ICD-10-CM

## 2024-08-28 NOTE — PROGRESS NOTES
of increasing daily physical activity and beginning to develop an exercise regimen/routine. We talked about exercise as being an important part of long term weight loss after surgery.     Comments:  During class, I discussed with patient the importance of getting into an exercise routine.  Pt is currently taking 60 minute exercise classes 3-4 times per week (kickboxing, water aerobics) for activity.  Pt has been encouraged to maintain as tolerated.     Behavior Modification       A behavior modification lesson was provided with an emphasis on developing mindful eating behaviors. We talked about how to eat more mindfully and identify emotional eating triggers. Tips and recommendations for how to make these changes were provided. Pt was encouraged to keep a food journal and record what they were taking in daily.     Patient's reported eating behaviors: drinks 128 oz fluid per day and uses a straw, sips throughout the day        Overall Assessment: Pt demonstrates appropriate lifestyle changes evidenced by reported changes and small amounts of wt loss. Will continue to assess.     Patient-Set Goals:   1. Nutrition - maintain previously made changes and general healthy eating  2. Exercise - maintain exercise and increase as tolerated   3. Behavior -decrease use a straw, practice 30/30 drinking rule    Kate Cabrera, JESSICA  8/28/2024

## 2024-09-03 ENCOUNTER — OFFICE VISIT (OUTPATIENT)
Age: 29
End: 2024-09-03
Payer: MEDICAID

## 2024-09-03 ENCOUNTER — TELEPHONE (OUTPATIENT)
Age: 29
End: 2024-09-03

## 2024-09-03 VITALS
DIASTOLIC BLOOD PRESSURE: 74 MMHG | HEART RATE: 85 BPM | BODY MASS INDEX: 48.82 KG/M2 | WEIGHT: 293 LBS | OXYGEN SATURATION: 95 % | HEIGHT: 65 IN | TEMPERATURE: 98.5 F | SYSTOLIC BLOOD PRESSURE: 135 MMHG | RESPIRATION RATE: 18 BRPM

## 2024-09-03 DIAGNOSIS — E66.01 MORBID OBESITY (HCC): Primary | ICD-10-CM

## 2024-09-03 PROCEDURE — 99214 OFFICE O/P EST MOD 30 MIN: CPT | Performed by: SURGERY

## 2024-09-03 ASSESSMENT — PATIENT HEALTH QUESTIONNAIRE - PHQ9
SUM OF ALL RESPONSES TO PHQ QUESTIONS 1-9: 0
SUM OF ALL RESPONSES TO PHQ QUESTIONS 1-9: 0
SUM OF ALL RESPONSES TO PHQ9 QUESTIONS 1 & 2: 0
SUM OF ALL RESPONSES TO PHQ QUESTIONS 1-9: 0
SUM OF ALL RESPONSES TO PHQ QUESTIONS 1-9: 0
1. LITTLE INTEREST OR PLEASURE IN DOING THINGS: NOT AT ALL
2. FEELING DOWN, DEPRESSED OR HOPELESS: NOT AT ALL

## 2024-09-03 NOTE — PROGRESS NOTES
Identified patient with two patient identifiers (name and ). Reviewed chart in preparation for visit and have obtained necessary documentation.    Cordelia Whitman is a 28 y.o. female  No chief complaint on file.    /74 (Site: Left Lower Arm, Position: Sitting, Cuff Size: Medium Adult)   Pulse 85   Temp 98.5 °F (36.9 °C) (Oral)   Resp 18   Ht 1.651 m (5' 5\")   Wt (!) 152.4 kg (336 lb)   SpO2 95%   BMI 55.91 kg/m²     1. Have you been to the ER, urgent care clinic since your last visit?  Hospitalized since your last visit?no    2. Have you seen or consulted any other health care providers outside of the [facility] since your last visit?  Include any pap smears or colon screening. no

## 2024-09-03 NOTE — PROGRESS NOTES
Bariatric Surgery Progress Note    CC: Morbid obesity  Subjective:     Patient has completed 3 out of 3 visits with me.  On visit 5 coming up of 6 with dietary.  Has lost weight.    Total weight loss to date: 4 pounds    Tobacco use: None    EGD / UGI reviewed / issues: EGD with GERD changes and irregular distal esophagus with Rene's changes without dysplasia, no hiatal hernia    Sleep apnea addressed: Negative    Contraceptive plan: Abstinence    Previous relevant surgeries: C-sections    Patient Active Problem List    Diagnosis Date Noted    Esophageal reflux 2024    Hidradenitis suppurativa 2023    Controlled type 2 diabetes mellitus without complication, without long-term current use of insulin ([facility]) 2023    Morbid obesity with BMI of 50.0-59.9, adult ([facility]) 2021    Anxiety 2021    Bipolar 2 disorder ([facility]) 2021    Plantar fasciitis 2021    Hepatic steatosis 2021    Overactive bladder 2021    Acquired hypothyroidism 2021    Primary insomnia 2021    Pregnant 2014      Past Medical History:   Diagnosis Date    Acquired hypothyroidism 3/8/2021    Diabetes mellitus ([facility])     Hearing disorder     Hepatic steatosis 3/8/2021    Hyperlipidemia     Overactive bladder Unknown    Pre-eclampsia [id number]    Psychiatric disorder     anxiety and depression since age 16    Psychiatric problem     depression     Past Surgical History:   Procedure Laterality Date     SECTION  [id number]    ENDOSCOPY, COLON, DIAGNOSTIC      ESOPHAGOSCOPY N/A 2024    ESOPHAGOSCOPY BIOPSY performed by Neal Ross MD at [facility] ENDOSCOPY    GYN      c/section x1    ORTHOPEDIC SURGERY      left foot fracture    OTHER SURGICAL HISTORY      wisdom teeth     OTHER SURGICAL HISTORY      myrengotomy 2010    OTHER SURGICAL HISTORY      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

## 2024-09-03 NOTE — TELEPHONE ENCOUNTER
Identified patient with two patient identifiers (name and ). Reviewed chart in preparation for encounter and have obtained necessary documentation.    Called and spoke with patient to schedule her [facility] appointment Per Claudine due to still needing Labs completed. Patient  is scheduled for 24 at 11 :00 Am. Patient understood what was discussed and thankful for the call.

## 2024-09-25 ENCOUNTER — OFFICE VISIT (OUTPATIENT)
Age: 29
End: 2024-09-25

## 2024-09-25 DIAGNOSIS — E66.01 MORBID OBESITY: Primary | ICD-10-CM

## 2024-10-02 ENCOUNTER — TELEPHONE (OUTPATIENT)
Age: 29
End: 2024-10-02

## 2024-10-02 NOTE — TELEPHONE ENCOUNTER
Identified patient with two patient identifiers (name and ). Reviewed chart in preparation for encounter and have obtained necessary documentation.    Called and spoke with patient regarding [facility] insurance requirements. Patient is aware she still needs to finish nutrition and have her approved psych eval sent over to be completed for her requirements.

## 2024-10-23 ENCOUNTER — OFFICE VISIT (OUTPATIENT)
Age: 29
End: 2024-10-23

## 2024-10-23 DIAGNOSIS — E66.01 MORBID OBESITY: Primary | ICD-10-CM

## 2024-10-23 NOTE — PROGRESS NOTES
[facility] at [facility]  Supervised Weight Loss     Date:   10/23/2024    Patient's Name: Cordelia Whitman  : 1995    Insurance:  [facility]                          Session:   Surgery: Gastric Bypass                 Surgeon:  Neal Ross M.D.      Height: 65\"                 Recent Office Weight (9/3/24):    336      Lbs.                              BMI: 55             Pounds Lost since last month: 0#               Pounds Gained since last month: 1#     Starting Weight: 340#                       Previous Month’s Weight: 335#  Overall Pounds Lost: 4#                  Overall Pounds Gained: 0     Other Pertinent Information: Today's appointment was completed over the phone. Pt recommended to lose 16# prior to surgery approval.    Smoking Status:  none  Alcohol Intake: none    I have reviewed with pt the guidelines of the supervised wt loss program.  Pt understands the expectations of some wt loss during the program and that wt gain could delay the process. I have also explained that appointments need to be consecutive and missing an appointment may result in starting over. Pt has received this information in writing.          Changes that patient has made since last month include:  no straws, joined a support group.      Eating Habits and Behaviors  General healthy eating guidelines were also discussed. Pts were instructed that their plate should be made up 1/2 plate coming from non-starchy vegetables, 1/4 coming from lean meat, and 1/4 of their plate coming from carbohydrates, including fruits, starches, or milk. We discussed measuring meals to 1/2 cup total per meal after surgery. Drinking only calorie-free, sugar-free and non-carbonated beverages. We discussed the importance of drinking 64 ounces of fluid per day to prevent dehydration post-operatively.                       Patient's current diet habits include: Pt is eating 3 meals per day. Protein shake at

## 2024-10-25 ENCOUNTER — OFFICE VISIT (OUTPATIENT)
Facility: CLINIC | Age: 29
End: 2024-10-25
Payer: MEDICAID

## 2024-10-25 VITALS
HEIGHT: 65 IN | HEART RATE: 92 BPM | OXYGEN SATURATION: 98 % | BODY MASS INDEX: 48.82 KG/M2 | SYSTOLIC BLOOD PRESSURE: 132 MMHG | DIASTOLIC BLOOD PRESSURE: 82 MMHG | WEIGHT: 293 LBS

## 2024-10-25 DIAGNOSIS — F41.9 ANXIETY: ICD-10-CM

## 2024-10-25 DIAGNOSIS — E66.01 MORBID OBESITY WITH BMI OF 50.0-59.9, ADULT: ICD-10-CM

## 2024-10-25 DIAGNOSIS — F51.01 PRIMARY INSOMNIA: ICD-10-CM

## 2024-10-25 DIAGNOSIS — E03.9 ACQUIRED HYPOTHYROIDISM: ICD-10-CM

## 2024-10-25 DIAGNOSIS — F31.81 BIPOLAR 2 DISORDER (HCC): ICD-10-CM

## 2024-10-25 DIAGNOSIS — E11.9 CONTROLLED TYPE 2 DIABETES MELLITUS WITHOUT COMPLICATION, WITHOUT LONG-TERM CURRENT USE OF INSULIN (HCC): Primary | ICD-10-CM

## 2024-10-25 DIAGNOSIS — K76.0 HEPATIC STEATOSIS: ICD-10-CM

## 2024-10-25 DIAGNOSIS — H66.004 RECURRENT ACUTE SUPPURATIVE OTITIS MEDIA OF RIGHT EAR WITHOUT SPONTANEOUS RUPTURE OF TYMPANIC MEMBRANE: ICD-10-CM

## 2024-10-25 LAB
ALBUMIN SERPL-MCNC: 3.5 G/DL (ref 3.5–5)
ALBUMIN/GLOB SERPL: 0.9 (ref 1.1–2.2)
ALP SERPL-CCNC: 89 U/L (ref 45–117)
ALT SERPL-CCNC: 44 U/L (ref 12–78)
ANION GAP SERPL CALC-SCNC: 9 MMOL/L (ref 2–12)
AST SERPL-CCNC: 20 U/L (ref 15–37)
BASOPHILS # BLD: 0.1 K/UL (ref 0–0.1)
BASOPHILS NFR BLD: 1 % (ref 0–1)
BILIRUB SERPL-MCNC: 0.3 MG/DL (ref 0.2–1)
BUN SERPL-MCNC: 9 MG/DL (ref 6–20)
BUN/CREAT SERPL: 13 (ref 12–20)
CALCIUM SERPL-MCNC: 9.5 MG/DL (ref 8.5–10.1)
CHLORIDE SERPL-SCNC: 103 MMOL/L (ref 97–108)
CO2 SERPL-SCNC: 26 MMOL/L (ref 21–32)
CREAT SERPL-MCNC: 0.7 MG/DL (ref 0.55–1.02)
CREAT UR-MCNC: 162 MG/DL
DIFFERENTIAL METHOD BLD: NORMAL
EOSINOPHIL # BLD: 0.1 K/UL (ref 0–0.4)
EOSINOPHIL NFR BLD: 1 % (ref 0–7)
ERYTHROCYTE [DISTWIDTH] IN BLOOD BY AUTOMATED COUNT: 13.6 % (ref 11.5–14.5)
EST. AVERAGE GLUCOSE BLD GHB EST-MCNC: 128 MG/DL
GLOBULIN SER CALC-MCNC: 3.8 G/DL (ref 2–4)
GLUCOSE SERPL-MCNC: 148 MG/DL (ref 65–100)
HBA1C MFR BLD: 6.1 % (ref 4–5.6)
HCT VFR BLD AUTO: 37.9 % (ref 35–47)
HGB BLD-MCNC: 12.2 G/DL (ref 11.5–16)
IMM GRANULOCYTES # BLD AUTO: 0 K/UL (ref 0–0.04)
IMM GRANULOCYTES NFR BLD AUTO: 0 % (ref 0–0.5)
LYMPHOCYTES # BLD: 2.6 K/UL (ref 0.8–3.5)
LYMPHOCYTES NFR BLD: 25 % (ref 12–49)
MCH RBC QN AUTO: 27.9 PG (ref 26–34)
MCHC RBC AUTO-ENTMCNC: 32.2 G/DL (ref 30–36.5)
MCV RBC AUTO: 86.5 FL (ref 80–99)
MICROALBUMIN UR-MCNC: 2.48 MG/DL
MICROALBUMIN/CREAT UR-RTO: 15 MG/G (ref 0–30)
MONOCYTES # BLD: 0.5 K/UL (ref 0–1)
MONOCYTES NFR BLD: 5 % (ref 5–13)
NEUTS SEG # BLD: 7.1 K/UL (ref 1.8–8)
NEUTS SEG NFR BLD: 68 % (ref 32–75)
NRBC # BLD: 0 K/UL (ref 0–0.01)
NRBC BLD-RTO: 0 PER 100 WBC
PLATELET # BLD AUTO: 354 K/UL (ref 150–400)
PMV BLD AUTO: 10 FL (ref 8.9–12.9)
POTASSIUM SERPL-SCNC: 3.9 MMOL/L (ref 3.5–5.1)
PROT SERPL-MCNC: 7.3 G/DL (ref 6.4–8.2)
RBC # BLD AUTO: 4.38 M/UL (ref 3.8–5.2)
SODIUM SERPL-SCNC: 138 MMOL/L (ref 136–145)
SPECIMEN HOLD: NORMAL
T4 FREE SERPL-MCNC: 0.8 NG/DL (ref 0.8–1.5)
TSH SERPL DL<=0.05 MIU/L-ACNC: 7.08 UIU/ML (ref 0.36–3.74)
WBC # BLD AUTO: 10.5 K/UL (ref 3.6–11)

## 2024-10-25 PROCEDURE — 99214 OFFICE O/P EST MOD 30 MIN: CPT | Performed by: PHYSICIAN ASSISTANT

## 2024-10-25 PROCEDURE — 3044F HG A1C LEVEL LT 7.0%: CPT | Performed by: PHYSICIAN ASSISTANT

## 2024-10-25 RX ORDER — CEFUROXIME AXETIL 500 MG/1
500 TABLET ORAL 2 TIMES DAILY
Qty: 14 TABLET | Refills: 0 | Status: SHIPPED | OUTPATIENT
Start: 2024-10-25 | End: 2024-11-01

## 2024-10-25 SDOH — ECONOMIC STABILITY: FOOD INSECURITY: WITHIN THE PAST 12 MONTHS, THE FOOD YOU BOUGHT JUST DIDN'T LAST AND YOU DIDN'T HAVE MONEY TO GET MORE.: NEVER TRUE

## 2024-10-25 SDOH — ECONOMIC STABILITY: FOOD INSECURITY: WITHIN THE PAST 12 MONTHS, YOU WORRIED THAT YOUR FOOD WOULD RUN OUT BEFORE YOU GOT MONEY TO BUY MORE.: NEVER TRUE

## 2024-10-25 SDOH — ECONOMIC STABILITY: INCOME INSECURITY: HOW HARD IS IT FOR YOU TO PAY FOR THE VERY BASICS LIKE FOOD, HOUSING, MEDICAL CARE, AND HEATING?: NOT HARD AT ALL

## 2024-10-25 ASSESSMENT — PATIENT HEALTH QUESTIONNAIRE - PHQ9
SUM OF ALL RESPONSES TO PHQ QUESTIONS 1-9: 6
1. LITTLE INTEREST OR PLEASURE IN DOING THINGS: NOT AT ALL
SUM OF ALL RESPONSES TO PHQ QUESTIONS 1-9: 6
5. POOR APPETITE OR OVEREATING: NOT AT ALL
SUM OF ALL RESPONSES TO PHQ QUESTIONS 1-9: 6
7. TROUBLE CONCENTRATING ON THINGS, SUCH AS READING THE NEWSPAPER OR WATCHING TELEVISION: NOT AT ALL
SUM OF ALL RESPONSES TO PHQ9 QUESTIONS 1 & 2: 0
2. FEELING DOWN, DEPRESSED OR HOPELESS: NOT AT ALL
6. FEELING BAD ABOUT YOURSELF - OR THAT YOU ARE A FAILURE OR HAVE LET YOURSELF OR YOUR FAMILY DOWN: NOT AT ALL
3. TROUBLE FALLING OR STAYING ASLEEP: NEARLY EVERY DAY
1. LITTLE INTEREST OR PLEASURE IN DOING THINGS: NOT AT ALL
8. MOVING OR SPEAKING SO SLOWLY THAT OTHER PEOPLE COULD HAVE NOTICED. OR THE OPPOSITE, BEING SO FIGETY OR RESTLESS THAT YOU HAVE BEEN MOVING AROUND A LOT MORE THAN USUAL: NOT AT ALL
4. FEELING TIRED OR HAVING LITTLE ENERGY: NEARLY EVERY DAY
SUM OF ALL RESPONSES TO PHQ QUESTIONS 1-9: 6
2. FEELING DOWN, DEPRESSED OR HOPELESS: NOT AT ALL
SUM OF ALL RESPONSES TO PHQ QUESTIONS 1-9: 0
SUM OF ALL RESPONSES TO PHQ QUESTIONS 1-9: 0
10. IF YOU CHECKED OFF ANY PROBLEMS, HOW DIFFICULT HAVE THESE PROBLEMS MADE IT FOR YOU TO DO YOUR WORK, TAKE CARE OF THINGS AT HOME, OR GET ALONG WITH OTHER PEOPLE: NOT DIFFICULT AT ALL
SUM OF ALL RESPONSES TO PHQ QUESTIONS 1-9: 0
SUM OF ALL RESPONSES TO PHQ QUESTIONS 1-9: 0
SUM OF ALL RESPONSES TO PHQ9 QUESTIONS 1 & 2: 0
9. THOUGHTS THAT YOU WOULD BE BETTER OFF DEAD, OR OF HURTING YOURSELF: NOT AT ALL

## 2024-10-25 ASSESSMENT — ENCOUNTER SYMPTOMS
BLOOD IN STOOL: 0
EYES NEGATIVE: 1
CONSTIPATION: 0
NAUSEA: 0
RESPIRATORY NEGATIVE: 1
SHORTNESS OF BREATH: 0
VOMITING: 0
DIARRHEA: 0
ABDOMINAL PAIN: 0

## 2024-10-25 NOTE — PROGRESS NOTES
Cordelia Whitman is a 29 y.o. year old female seen in clinic today for   Chief Complaint   Patient presents with    Follow-up     4B//         she is here today to follow up for DM2, Hypothyroid, Morbid obesity  She is completing her sessions with bariatrics for bariatric surgery. Last visit in next month.    In 2023 she was diagnosed with diabetes. Has made dietary changes and decreased her A1c on her own. Did not tolerate trulicity for weight loss. No signs or symptoms of diabetes. 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   Insomnia has improved, tends to sleep more during day. Kids tends to wake her up at night.     Hx of anxiety and depression. Well controlled on fluoxetine 40 mg.     Pt has a hx of Hypothyroidism. They take 100 mcg of thyroid replacement. They are compliant with their daily medications. Pt denies any temperature imbalances, hair related symptoms, bowel changes or abnormal weight gain/loss.    Lab Results   Component Value Date/Time    TSH 10.30 04/22/2024 09:34 AM     Has had persistent ear pain and sinus drainage. Took amoxicillin few weeks ago without improvement.    she specifically denies any CP, SOB, HA. Dizziness, fevers, chills, N/V/D, urinary symptoms or other bowel changes.    Current Outpatient Medications on File Prior to Visit   Medication Sig Dispense Refill    omeprazole (PRILOSEC) 40 MG delayed release capsule Take 1 capsule by mouth every morning (before breakfast) 90 capsule 3    atorvastatin (LIPITOR) 20 MG tablet Take 1 tablet by mouth nightly      levothyroxine (SYNTHROID) 100 MCG tablet Take 1 tablet by mouth Daily      fluconazole (DIFLUCAN) 150 MG tablet TAKE 1 TABLET BY MOUTH 1 TIME PER WEEK FOR 52 DOSES.-1 MONTH PER FILL PER INS      nystatin (MYCOSTATIN) 565788 UNIT/GM powder APPLY TO AREAS OF RASH ONCE DAILY AS NEEDED.      vitamin D (ERGOCALCIFEROL) 1.25 MG (06102 UT) CAPS capsule Take 1 capsule by mouth Once a week at 5 PM      FLUoxetine (PROZAC) 40 MG capsule Take

## 2024-10-25 NOTE — PROGRESS NOTES
\"Have you been to the ER, urgent care clinic since your last visit?  Hospitalized since your last visit?\"    YES - When: approximately 1 months ago.  Where and Why: [facility]- Strep and Ear infection.    “Have you seen or consulted any other health care providers outside our system since your last visit?”    NO     “Have you had a pap smear?”    YES - Where: [facility] in [place] Nurse/CMA to request most recent records if not in the chart    No cervical cancer screening on file       “Have you had a diabetic eye exam?”    NO     No diabetic eye exam on file

## 2024-11-19 ENCOUNTER — OFFICE VISIT (OUTPATIENT)
Age: 29
End: 2024-11-19
Payer: MEDICAID

## 2024-11-19 VITALS
RESPIRATION RATE: 20 BRPM | HEART RATE: 96 BPM | DIASTOLIC BLOOD PRESSURE: 82 MMHG | HEIGHT: 65 IN | WEIGHT: 293 LBS | TEMPERATURE: 98.2 F | BODY MASS INDEX: 48.82 KG/M2 | OXYGEN SATURATION: 95 % | SYSTOLIC BLOOD PRESSURE: 129 MMHG

## 2024-11-19 DIAGNOSIS — E51.9 THIAMINE DEFICIENCY: ICD-10-CM

## 2024-11-19 DIAGNOSIS — E61.1 IRON DEFICIENCY: ICD-10-CM

## 2024-11-19 DIAGNOSIS — E53.8 VITAMIN B12 DEFICIENCY: ICD-10-CM

## 2024-11-19 DIAGNOSIS — E66.01 MORBID OBESITY: Primary | ICD-10-CM

## 2024-11-19 DIAGNOSIS — Z00.00 ENCOUNTER FOR PREVENTIVE CARE: ICD-10-CM

## 2024-11-19 DIAGNOSIS — E55.9 VITAMIN D DEFICIENCY: ICD-10-CM

## 2024-11-19 PROCEDURE — 99203 OFFICE O/P NEW LOW 30 MIN: CPT | Performed by: NURSE PRACTITIONER

## 2024-11-19 ASSESSMENT — PATIENT HEALTH QUESTIONNAIRE - PHQ9
1. LITTLE INTEREST OR PLEASURE IN DOING THINGS: NOT AT ALL
SUM OF ALL RESPONSES TO PHQ QUESTIONS 1-9: 0
SUM OF ALL RESPONSES TO PHQ QUESTIONS 1-9: 0
2. FEELING DOWN, DEPRESSED OR HOPELESS: NOT AT ALL
SUM OF ALL RESPONSES TO PHQ9 QUESTIONS 1 & 2: 0
SUM OF ALL RESPONSES TO PHQ QUESTIONS 1-9: 0
SUM OF ALL RESPONSES TO PHQ QUESTIONS 1-9: 0

## 2024-11-19 ASSESSMENT — ENCOUNTER SYMPTOMS
ABDOMINAL PAIN: 0
SHORTNESS OF BREATH: 0
CHEST TIGHTNESS: 0
NAUSEA: 0
VOMITING: 0

## 2024-11-19 NOTE — PROGRESS NOTES
Identified patient with two patient identifiers (name and ). Reviewed chart in preparation for visit and have obtained necessary documentation.    Cordelia Whitman is a 29 y.o. female  No chief complaint on file.    /82 (Site: Right Upper Arm, Position: Sitting, Cuff Size: Large Adult)   Pulse 96   Temp 98.2 °F (36.8 °C) (Oral)   Resp 20   Ht 1.651 m (5' 5\")   Wt (!) 151.6 kg (334 lb 3.2 oz)   SpO2 95%   BMI 55.61 kg/m²     1. Have you been to the ER, urgent care clinic since your last visit?  Hospitalized since your last visit?no    2. Have you seen or consulted any other health care providers outside of the [facility] since your last visit?  Include any pap smears or colon screening. no   
process.

## 2024-11-25 ENCOUNTER — TELEPHONE (OUTPATIENT)
Age: 29
End: 2024-11-25

## 2024-11-25 NOTE — TELEPHONE ENCOUNTER
Spoke with patient to let her know that we are stilli in need of her completed lab work. Once lab work is received, we can then schedule her for an appointment to meet with Dr. Ross for her final review.

## 2025-01-23 LAB
25(OH)D3+25(OH)D2 SERPL-MCNC: 14.3 NG/ML (ref 30–100)
FOLATE SERPL-MCNC: 9.8 NG/ML
IRON SATN MFR SERPL: 15 % (ref 15–55)
IRON SERPL-MCNC: 61 UG/DL (ref 27–159)
TIBC SERPL-MCNC: 402 UG/DL (ref 250–450)
UIBC SERPL-MCNC: 341 UG/DL (ref 131–425)
VIT B12 SERPL-MCNC: 391 PG/ML (ref 232–1245)

## 2025-01-24 ENCOUNTER — OFFICE VISIT (OUTPATIENT)
Age: 30
End: 2025-01-24

## 2025-01-24 DIAGNOSIS — E66.01 MORBID OBESITY: Primary | ICD-10-CM

## 2025-01-24 NOTE — PROGRESS NOTES
Patient was seen a few months ago.  Dietary is now complete.  Okay to submit for robotic bypass.  Does not need to be seen today.    Neal Ross MD, FACS, [facility]  Bariatric and General Surgeon  [facility]

## 2025-01-28 LAB — VIT B1 BLD-SCNC: 123.2 NMOL/L (ref 66.5–200)

## 2025-01-29 ENCOUNTER — CLINICAL DOCUMENTATION (OUTPATIENT)
Age: 30
End: 2025-01-29

## 2025-01-31 ENCOUNTER — TELEPHONE (OUTPATIENT)
Age: 30
End: 2025-01-31

## 2025-01-31 NOTE — TELEPHONE ENCOUNTER
Called Madelyn to confirm that fax was received for prior authorization.      Spoke to: Kaushal, WAS RECEIVED DUE DATE 2/12 [facility] REF [id number]

## 2025-02-10 ENCOUNTER — TELEPHONE (OUTPATIENT)
Age: 30
End: 2025-02-10

## 2025-02-10 ENCOUNTER — PREP FOR PROCEDURE (OUTPATIENT)
Age: 30
End: 2025-02-10

## 2025-02-10 DIAGNOSIS — E66.01 MORBID OBESITY: ICD-10-CM

## 2025-02-10 NOTE — TELEPHONE ENCOUNTER
Spoke to patient, I offered 3/13 for surgery and she accepted.  I let her know that I will be scheduling her pre-op appointments which are: virtual diet and info class, PAT, H&P and to meet with the doctor to sign consent.  That is any of these appointments are no showed or rescheduled it can push out her surgery date.  she understood.  I also let them know that I will be scheduling their follow up appointments after surgery.   That once everything is scheduled I will put all the information in a letter with dates, times, who they are going to see and if it is virtual or in person.  This letter will post to your my chart and I will send it out in the mail.  I will also call you once it is completed.  You will hear from me by end of day.  She acknowledged ok and thank you

## 2025-02-10 NOTE — TELEPHONE ENCOUNTER
Spoke to patient to let her know her surgery letter has posted to her my chart and will go out in the mail. I reviewed the diet and info class that that won't show up in her my chart.  I informed her to be on the look out for an e-mail from Monika in your junk/spam folder.  I explained what will be in the e-mail and to please reply to it so we know you received it.   I asked her to review her appointments and reach out to me as soon as she can if there is any scheduling conflicts, that way we can try to reschedule the appointment with out having to move her surgery. She acknowledged ok and thank you

## 2025-02-10 NOTE — PERIOP NOTE
STACEY 2-17  @ 11;00 SCHEDULED BY DIANE THROUGH NANI IBARRA  Received: Today  Diane Moya [place]; Bess Reyes  Patients surgery is scheduled for 3/13.  Yasmin IBARRA scheduled the week of 2/17. (IF YOU HAVE TO SCHEDULE ON 2/18 PLEASE SCHEDULE IN THE AFTERNOON)    Thanks  Diane

## 2025-02-11 RX ORDER — ENOXAPARIN SODIUM 100 MG/ML
40 INJECTION SUBCUTANEOUS ONCE
Status: CANCELLED | OUTPATIENT
Start: 2025-02-11 | End: 2025-02-11

## 2025-02-11 RX ORDER — ACETAMINOPHEN 160 MG/5ML
1000 LIQUID ORAL
Status: CANCELLED | OUTPATIENT
Start: 2025-02-11

## 2025-02-11 RX ORDER — SODIUM CHLORIDE 0.9 % (FLUSH) 0.9 %
5-40 SYRINGE (ML) INJECTION EVERY 12 HOURS SCHEDULED
Status: CANCELLED | OUTPATIENT
Start: 2025-02-11

## 2025-02-11 RX ORDER — SODIUM CHLORIDE, SODIUM LACTATE, POTASSIUM CHLORIDE, CALCIUM CHLORIDE 600; 310; 30; 20 MG/100ML; MG/100ML; MG/100ML; MG/100ML
INJECTION, SOLUTION INTRAVENOUS CONTINUOUS
Status: CANCELLED | OUTPATIENT
Start: 2025-02-11

## 2025-02-11 RX ORDER — GABAPENTIN 250 MG/5ML
500 SOLUTION ORAL
Status: CANCELLED | OUTPATIENT
Start: 2025-02-11

## 2025-02-11 RX ORDER — SCOPOLAMINE 1 MG/3D
1 PATCH, EXTENDED RELEASE TRANSDERMAL
Status: CANCELLED | OUTPATIENT
Start: 2025-02-11 | End: 2025-02-14

## 2025-02-11 RX ORDER — SODIUM CHLORIDE 0.9 % (FLUSH) 0.9 %
5-40 SYRINGE (ML) INJECTION PRN
Status: CANCELLED | OUTPATIENT
Start: 2025-02-11

## 2025-02-11 RX ORDER — SODIUM CHLORIDE 9 MG/ML
INJECTION, SOLUTION INTRAVENOUS PRN
Status: CANCELLED | OUTPATIENT
Start: 2025-02-11

## 2025-02-17 ENCOUNTER — HOSPITAL ENCOUNTER (OUTPATIENT)
Facility: HOSPITAL | Age: 30
Discharge: HOME | End: 2025-02-20
Payer: MEDICAID

## 2025-02-17 VITALS
SYSTOLIC BLOOD PRESSURE: 127 MMHG | DIASTOLIC BLOOD PRESSURE: 80 MMHG | BODY MASS INDEX: 48.82 KG/M2 | TEMPERATURE: 98.5 F | WEIGHT: 293 LBS | HEIGHT: 65 IN | HEART RATE: 77 BPM | RESPIRATION RATE: 18 BRPM

## 2025-02-17 DIAGNOSIS — E66.01 MORBID OBESITY: ICD-10-CM

## 2025-02-17 LAB
ALBUMIN SERPL-MCNC: 3.5 G/DL (ref 3.5–5)
ALBUMIN/GLOB SERPL: 0.9 (ref 1.1–2.2)
ALP SERPL-CCNC: 85 U/L (ref 45–117)
ALT SERPL-CCNC: 47 U/L (ref 12–78)
ANION GAP SERPL CALC-SCNC: 6 MMOL/L (ref 2–12)
AST SERPL-CCNC: 31 U/L (ref 15–37)
BASOPHILS # BLD: 0.04 K/UL (ref 0–0.1)
BASOPHILS NFR BLD: 0.4 % (ref 0–1)
BILIRUB SERPL-MCNC: 0.3 MG/DL (ref 0.2–1)
BUN SERPL-MCNC: 13 MG/DL (ref 6–20)
BUN/CREAT SERPL: 15 (ref 12–20)
CALCIUM SERPL-MCNC: 9.3 MG/DL (ref 8.5–10.1)
CHLORIDE SERPL-SCNC: 106 MMOL/L (ref 97–108)
CO2 SERPL-SCNC: 25 MMOL/L (ref 21–32)
CREAT SERPL-MCNC: 0.87 MG/DL (ref 0.55–1.02)
DIFFERENTIAL METHOD BLD: ABNORMAL
EKG ATRIAL RATE: 75 BPM
EKG DIAGNOSIS: NORMAL
EKG P AXIS: 4 DEGREES
EKG P-R INTERVAL: 154 MS
EKG Q-T INTERVAL: 386 MS
EKG QRS DURATION: 78 MS
EKG QTC CALCULATION (BAZETT): 431 MS
EKG R AXIS: 20 DEGREES
EKG T AXIS: 16 DEGREES
EKG VENTRICULAR RATE: 75 BPM
EOSINOPHIL # BLD: 0.16 K/UL (ref 0–0.4)
EOSINOPHIL NFR BLD: 1.7 % (ref 0–7)
ERYTHROCYTE [DISTWIDTH] IN BLOOD BY AUTOMATED COUNT: 14 % (ref 11.5–14.5)
EST. AVERAGE GLUCOSE BLD GHB EST-MCNC: 146 MG/DL
GLOBULIN SER CALC-MCNC: 3.9 G/DL (ref 2–4)
GLUCOSE SERPL-MCNC: 203 MG/DL (ref 65–100)
HBA1C MFR BLD: 6.7 % (ref 4–5.6)
HCT VFR BLD AUTO: 38.6 % (ref 35–47)
HGB BLD-MCNC: 11.9 G/DL (ref 11.5–16)
IMM GRANULOCYTES # BLD AUTO: 0.04 K/UL (ref 0–0.04)
IMM GRANULOCYTES NFR BLD AUTO: 0.4 % (ref 0–0.5)
LYMPHOCYTES # BLD: 2.65 K/UL (ref 0.8–3.5)
LYMPHOCYTES NFR BLD: 28.6 % (ref 12–49)
MCH RBC QN AUTO: 26.5 PG (ref 26–34)
MCHC RBC AUTO-ENTMCNC: 30.8 G/DL (ref 30–36.5)
MCV RBC AUTO: 86 FL (ref 80–99)
MONOCYTES # BLD: 0.34 K/UL (ref 0–1)
MONOCYTES NFR BLD: 3.7 % (ref 5–13)
NEUTS SEG # BLD: 6.05 K/UL (ref 1.8–8)
NEUTS SEG NFR BLD: 65.2 % (ref 32–75)
NRBC # BLD: 0 K/UL (ref 0–0.01)
NRBC BLD-RTO: 0 PER 100 WBC
PLATELET # BLD AUTO: 347 K/UL (ref 150–400)
PMV BLD AUTO: 9.7 FL (ref 8.9–12.9)
POTASSIUM SERPL-SCNC: 3.9 MMOL/L (ref 3.5–5.1)
PROT SERPL-MCNC: 7.4 G/DL (ref 6.4–8.2)
RBC # BLD AUTO: 4.49 M/UL (ref 3.8–5.2)
SODIUM SERPL-SCNC: 137 MMOL/L (ref 136–145)
WBC # BLD AUTO: 9.3 K/UL (ref 3.6–11)

## 2025-02-17 PROCEDURE — 71046 X-RAY EXAM CHEST 2 VIEWS: CPT

## 2025-02-17 PROCEDURE — 36415 COLL VENOUS BLD VENIPUNCTURE: CPT

## 2025-02-17 PROCEDURE — 93005 ELECTROCARDIOGRAM TRACING: CPT

## 2025-02-17 PROCEDURE — 85025 COMPLETE CBC W/AUTO DIFF WBC: CPT

## 2025-02-17 PROCEDURE — 83036 HEMOGLOBIN GLYCOSYLATED A1C: CPT

## 2025-02-17 PROCEDURE — 80053 COMPREHEN METABOLIC PANEL: CPT

## 2025-02-17 RX ORDER — CLINDAMYCIN PHOSPHATE 10 MG/G
GEL TOPICAL PRN
COMMUNITY
Start: 2025-01-30

## 2025-02-17 RX ORDER — FLUCONAZOLE 150 MG/1
150 TABLET ORAL WEEKLY
COMMUNITY

## 2025-02-18 NOTE — PERIOP NOTE
INSTRUCTIONS GIVEN AND REVIEWED, 2 BOTTLES OF SOAP GIVEN PT GIVEN TIME TO ASK QUESTIONS     EKG PERFORMED     INSTRUCTIONS GIVEN FOR PT TO [facility] TO OBTAIN CHEST XRAY    KIM CALLED TO MAIN OR SPOKE WITH PATY ALVAREZ 825.9  
Sent note to Geoff Blue, nurse for Dr. Neal Ross via cc..    Marcelino Tellez,    Patient came in for preop testing on 2/17/25 and abnormals as follows..    Hgaic 6.7 (H)    Thanks  Sera Harley RN  Pre admit testing   [facility]  [phone]  
to your entire body from the neck down. Do not use on your head, face or private parts(genitals). Do not use CHG soap on open sores, wounds or areas of skin irritation.  Wash your body gently for 5 minutes. Do not wash your skin too hard. This soap does not create lather. Pay special attention to your underarms and from your belly button to your feet.  Turn the shower back on and rinse well to get CHG soap off your body.  Pat your skin dry with a clean, dry towel. Do not apply lotions or moisturizer.  Put on clean clothes and sleep on fresh bed sheets and do not allow pets to sleep with you.      Tips to help prevent infections after your surgery:  Protect your surgical wound from germs:  Hand washing is the most important thing you and your caregivers can do to prevent infections.  Keep your bandage clean and dry!  Do not touch your surgical wound.  Use clean, freshly washed towels and wash cloths every time you shower; do not share bath linens with others.  Until your surgical wound is healed, wear clothing and sleep on bed linens that are clean.   Do not allow pets to sleep in your bed with you or touch your surgical wound.  Do not smoke - smoking delays wound healing. This may be a good time to stop smoking.  If you have diabetes, it is important for you to manage your blood sugar levels properly before your surgery as well as after your surgery. Poorly managed blood sugar levels slow down wound healing and prevent you from healing completely.       Follow all instructions so your surgery won’t be cancelled.  Please, be on time.                    If a situation occurs and you are delayed the day of surgery, call [phone]/ [phone].    If your physical condition changes (like a fever, cold, flu, etc.) call your surgeon as soon as possible.    Home medication(s) reviewed and verified via during PAT appointment/call.    The patient was contacted in person.     She verbalized understanding of all

## 2025-02-26 ENCOUNTER — TELEMEDICINE (OUTPATIENT)
Age: 30
End: 2025-02-26

## 2025-02-26 VITALS — WEIGHT: 293 LBS | BODY MASS INDEX: 48.82 KG/M2 | HEIGHT: 65 IN

## 2025-02-26 DIAGNOSIS — E66.01 MORBID OBESITY: Primary | ICD-10-CM

## 2025-02-26 DIAGNOSIS — G89.18 POST-OP PAIN: ICD-10-CM

## 2025-02-26 PROCEDURE — 99024 POSTOP FOLLOW-UP VISIT: CPT | Performed by: NURSE PRACTITIONER

## 2025-02-26 RX ORDER — POLYETHYLENE GLYCOL 3350 17 G/17G
17 POWDER, FOR SOLUTION ORAL DAILY
Qty: 1530 G | Refills: 0 | Status: SHIPPED | OUTPATIENT
Start: 2025-02-26 | End: 2025-05-27

## 2025-02-26 RX ORDER — GABAPENTIN 100 MG/1
100-200 CAPSULE ORAL 3 TIMES DAILY
Qty: 30 CAPSULE | Refills: 0 | Status: SHIPPED | OUTPATIENT
Start: 2025-02-26 | End: 2025-03-03

## 2025-02-26 RX ORDER — ENOXAPARIN SODIUM 100 MG/ML
40 INJECTION SUBCUTANEOUS DAILY
Qty: 14 EACH | Refills: 0 | Status: SHIPPED | OUTPATIENT
Start: 2025-02-26

## 2025-02-26 RX ORDER — ONDANSETRON 4 MG/1
4 TABLET, FILM COATED ORAL EVERY 8 HOURS PRN
Qty: 30 TABLET | Refills: 0 | Status: SHIPPED | OUTPATIENT
Start: 2025-02-26

## 2025-02-26 RX ORDER — OMEPRAZOLE 40 MG/1
40 CAPSULE, DELAYED RELEASE ORAL
Qty: 90 CAPSULE | Refills: 1 | Status: SHIPPED | OUTPATIENT
Start: 2025-02-26

## 2025-02-26 ASSESSMENT — PATIENT HEALTH QUESTIONNAIRE - PHQ9
2. FEELING DOWN, DEPRESSED OR HOPELESS: NOT AT ALL
SUM OF ALL RESPONSES TO PHQ QUESTIONS 1-9: 0
10. IF YOU CHECKED OFF ANY PROBLEMS, HOW DIFFICULT HAVE THESE PROBLEMS MADE IT FOR YOU TO DO YOUR WORK, TAKE CARE OF THINGS AT HOME, OR GET ALONG WITH OTHER PEOPLE: NOT DIFFICULT AT ALL
SUM OF ALL RESPONSES TO PHQ QUESTIONS 1-9: 0
8. MOVING OR SPEAKING SO SLOWLY THAT OTHER PEOPLE COULD HAVE NOTICED. OR THE OPPOSITE, BEING SO FIGETY OR RESTLESS THAT YOU HAVE BEEN MOVING AROUND A LOT MORE THAN USUAL: NOT AT ALL
3. TROUBLE FALLING OR STAYING ASLEEP: NOT AT ALL
SUM OF ALL RESPONSES TO PHQ QUESTIONS 1-9: 0
1. LITTLE INTEREST OR PLEASURE IN DOING THINGS: NOT AT ALL
6. FEELING BAD ABOUT YOURSELF - OR THAT YOU ARE A FAILURE OR HAVE LET YOURSELF OR YOUR FAMILY DOWN: NOT AT ALL
SUM OF ALL RESPONSES TO PHQ QUESTIONS 1-9: 0
7. TROUBLE CONCENTRATING ON THINGS, SUCH AS READING THE NEWSPAPER OR WATCHING TELEVISION: NOT AT ALL
4. FEELING TIRED OR HAVING LITTLE ENERGY: NOT AT ALL
9. THOUGHTS THAT YOU WOULD BE BETTER OFF DEAD, OR OF HURTING YOURSELF: NOT AT ALL
5. POOR APPETITE OR OVEREATING: NOT AT ALL
SUM OF ALL RESPONSES TO PHQ9 QUESTIONS 1 & 2: 0

## 2025-02-26 ASSESSMENT — PAIN SCALES - GENERAL: PAINLEVEL_OUTOF10: 0

## 2025-02-26 NOTE — PROGRESS NOTES
Ht 1.651 m (5' 5\")   Wt (!) 151.5 kg (334 lb)   LMP 01/07/2025 (Exact Date)   BMI 55.58 kg/m²       Plan:   1/2. Morbid Obesity- Patient is schedule for Gastric bypass with Dr. Neal Ross on 3/13/2025 at [facility]. Counseled patient in regard to post diet restrictions, follow- up office visits, follow- up care. Patient as received educational booklet and vitamin list. Reviewed liver shrinking diet. Start date for Liver shrinking diet 2/27/2024  ERAS protocol reviewed:  Acetaminophen 1000 mg at noon and 8 pm day before surgery and may have clear liquids (no caffeine, no ETOH, no carbonation and calorie free) until 3 hours prior to surgery   Preadmission testing results reviewed with patient   Post operative prescriptions sent to pharmacy on file for AFTER surgery:    Acid suppression  x 30 days, then reassess need    Antiemetic Zofran 4 mg every 8 hours as needed for nausea #30   Antispasmodic na  Laxative:  Miralax 1 packet every day   DVT prophylaxis:      Lovenox 40 mg subcu daily for 14 days, early ambulation and mechanical compression, non-smoker for at least 90 days   Post op pain management:  Gabapentin 100-200 mg q 8 hours prn pain x 5 days; acetaminophen 1000 mg po q 8 hours prn; abdominal support / splinting; heating pad prn   Reviewed with patient that lifelong vitamin supplementation is recommended by provider as well as risks of non-compliance.        Pt verbalized understanding and questions were answered to the best of my knowledge and ability.    Cordelia Whitman, was evaluated through a synchronous (real-time) audio-video encounter. The patient (or guardian if applicable) is aware that this is a billable service, which includes applicable co-pays. This Virtual Visit was conducted with patient's (and/or legal guardian's) consent. Patient identification was verified, and a caregiver was present when appropriate.   The patient was located at Home: [address] [place] [address]

## 2025-02-26 NOTE — PROGRESS NOTES
Identified patient with two patient identifiers (name and ). Reviewed chart in preparation for visit and have obtained necessary documentation.    Cordelia Whitman is a 29 y.o. female  Chief Complaint   Patient presents with    H&P     Lap gastric bypass with Dr. Ross 3/13/25     Ht 1.651 m (5' 5\")   Wt (!) 151.5 kg (334 lb)   LMP 2025 (Exact Date)   BMI 55.58 kg/m²     1. Have you been to the ER, urgent care clinic since your last visit?  Hospitalized since your last visit?no    2. Have you seen or consulted any other health care providers outside of the [facility] since your last visit?  Include any pap smears or colon screening. no

## 2025-02-27 ENCOUNTER — TELEPHONE (OUTPATIENT)
Age: 30
End: 2025-02-27

## 2025-02-27 NOTE — TELEPHONE ENCOUNTER
Called patient to advise that her coverage is not effective as of 2/26/25 and advise she call her  and give us a call back to follow up in regards to appointment. Patient stated she would call them and give us a call back.

## 2025-02-27 NOTE — TELEPHONE ENCOUNTER
Patient returned call. RTE system currently reporting [employer] insurances as e-rejected. Confirmed patient' s insurance is active on [employer] with effective date of 1/1/24 with no scheduled term date.

## 2025-03-03 ENCOUNTER — OFFICE VISIT (OUTPATIENT)
Age: 30
End: 2025-03-03
Payer: MEDICAID

## 2025-03-03 VITALS
SYSTOLIC BLOOD PRESSURE: 130 MMHG | HEIGHT: 65 IN | DIASTOLIC BLOOD PRESSURE: 83 MMHG | BODY MASS INDEX: 48.82 KG/M2 | RESPIRATION RATE: 20 BRPM | HEART RATE: 85 BPM | TEMPERATURE: 98.2 F | WEIGHT: 293 LBS | OXYGEN SATURATION: 97 %

## 2025-03-03 DIAGNOSIS — E66.01 MORBID OBESITY: Primary | ICD-10-CM

## 2025-03-03 PROCEDURE — 99213 OFFICE O/P EST LOW 20 MIN: CPT | Performed by: SURGERY

## 2025-03-03 ASSESSMENT — PATIENT HEALTH QUESTIONNAIRE - PHQ9
SUM OF ALL RESPONSES TO PHQ QUESTIONS 1-9: 0
2. FEELING DOWN, DEPRESSED OR HOPELESS: NOT AT ALL
1. LITTLE INTEREST OR PLEASURE IN DOING THINGS: NOT AT ALL
SUM OF ALL RESPONSES TO PHQ QUESTIONS 1-9: 0

## 2025-03-03 NOTE — PROGRESS NOTES
Surgery Progress Note    3/3/2025    CC: Preoperative visit    Subjective:     Patient here to discuss surgery 1 last time.  Surgery next week.  On preoperative diet.  Continues to maintain her weight.  A few questions about surgery and the day of surgery process    Constitutional: No fever or chills  Neurologic: No headache  Eyes: No scleral icterus or irritated eyes  Nose: No nasal pain or drainage  Mouth: No oral lesions or sore throat  Cardiac: No palpations or chest pain  Pulmonary: No cough or shortness of breath  Gastrointestinal: No nausea, emesis, diarrhea, or constipation  Genitourinary: No dysuria  Musculoskeletal: No muscle or joint tenderness  Skin: No rashes or lesions  Psychiatric: No anxiety or depressed mood    Objective:     Vitals:    03/03/25 1300   BP: 130/83   Pulse: 85   Resp: 20   Temp: 98.2 °F (36.8 °C)   SpO2: 97%     Body mass index is 55.11 kg/m².  Wt 331 lbs    General: No acute distress, conversant  Eyes: PERRLA, no scleral icterus  HENT: Normocephalic without oral lesions  Neck: Trachea midline without LAD  Cardiac: Normal pulse rate and rhythm  Pulmonary: Symmetric chest rise with normal effort  GI: Soft, morbidly obese, NT, ND, no hernia, no splenomegaly  Skin: Warm without rash  Extremities: No edema or joint stiffness  Psych: Appropriate mood and affect    Assessment:     29-year-old female appearing for gastric bypass next week    Plan:     Once again discussed the risks of bleeding, infection, leak.  Discussed the long-term dietary, vitamins, and exercise requirements.  Patient expresses understanding and elects to proceed.      Neal Ross MD, FACS, [facility]  Bariatric and General Surgeon  [facility]

## 2025-03-03 NOTE — H&P (VIEW-ONLY)
Surgery Progress Note    3/3/2025    CC: Preoperative visit    Subjective:     Patient here to discuss surgery 1 last time.  Surgery next week.  On preoperative diet.  Continues to maintain her weight.  A few questions about surgery and the day of surgery process    Constitutional: No fever or chills  Neurologic: No headache  Eyes: No scleral icterus or irritated eyes  Nose: No nasal pain or drainage  Mouth: No oral lesions or sore throat  Cardiac: No palpations or chest pain  Pulmonary: No cough or shortness of breath  Gastrointestinal: No nausea, emesis, diarrhea, or constipation  Genitourinary: No dysuria  Musculoskeletal: No muscle or joint tenderness  Skin: No rashes or lesions  Psychiatric: No anxiety or depressed mood    Objective:     Vitals:    03/03/25 1300   BP: 130/83   Pulse: 85   Resp: 20   Temp: 98.2 °F (36.8 °C)   SpO2: 97%     Body mass index is 55.11 kg/m².  Wt 331 lbs    General: No acute distress, conversant  Eyes: PERRLA, no scleral icterus  HENT: Normocephalic without oral lesions  Neck: Trachea midline without LAD  Cardiac: Normal pulse rate and rhythm  Pulmonary: Symmetric chest rise with normal effort  GI: Soft, morbidly obese, NT, ND, no hernia, no splenomegaly  Skin: Warm without rash  Extremities: No edema or joint stiffness  Psych: Appropriate mood and affect    Assessment:     29-year-old female appearing for gastric bypass next week    Plan:     Once again discussed the risks of bleeding, infection, leak.  Discussed the long-term dietary, vitamins, and exercise requirements.  Patient expresses understanding and elects to proceed.      Neal Ross MD, FACS, [facility]  Bariatric and General Surgeon  [facility]

## 2025-03-03 NOTE — PROGRESS NOTES
Identified patient with two patient identifiers (name and ). Reviewed chart in preparation for visit and have obtained necessary documentation.    Cordelia Whitman is a 29 y.o. female  Chief Complaint   Patient presents with    sign consent     /83 (Site: Left Upper Arm, Position: Sitting, Cuff Size: Large Adult)   Pulse 85   Temp 98.2 °F (36.8 °C) (Oral)   Resp 20   Ht 1.651 m (5' 5\")   Wt (!) 150.2 kg (331 lb 3.2 oz)   LMP 2025 (Exact Date)   SpO2 97%   BMI 55.11 kg/m²     1. Have you been to the ER, urgent care clinic since your last visit?  Hospitalized since your last visit?no    2. Have you seen or consulted any other health care providers outside of the [facility] since your last visit?  Include any pap smears or colon screening. no

## 2025-03-12 ENCOUNTER — ANESTHESIA EVENT (OUTPATIENT)
Facility: HOSPITAL | Age: 30
End: 2025-03-12
Payer: MEDICAID

## 2025-03-13 ENCOUNTER — HOSPITAL ENCOUNTER (OUTPATIENT)
Facility: HOSPITAL | Age: 30
Setting detail: OBSERVATION
Discharge: HOME | End: 2025-03-14
Attending: SURGERY | Admitting: SURGERY
Payer: MEDICAID

## 2025-03-13 ENCOUNTER — ANESTHESIA (OUTPATIENT)
Facility: HOSPITAL | Age: 30
End: 2025-03-13
Payer: MEDICAID

## 2025-03-13 DIAGNOSIS — E66.01 MORBID OBESITY: Primary | ICD-10-CM

## 2025-03-13 LAB
GLUCOSE BLD STRIP.AUTO-MCNC: 113 MG/DL (ref 65–117)
GLUCOSE BLD STRIP.AUTO-MCNC: 169 MG/DL (ref 65–117)
HCG UR QL: NEGATIVE
SERVICE CMNT-IMP: ABNORMAL
SERVICE CMNT-IMP: NORMAL

## 2025-03-13 PROCEDURE — 7100000000 HC PACU RECOVERY - FIRST 15 MIN: Performed by: SURGERY

## 2025-03-13 PROCEDURE — 2580000003 HC RX 258: Performed by: ANESTHESIOLOGY

## 2025-03-13 PROCEDURE — 6360000002 HC RX W HCPCS: Performed by: SURGERY

## 2025-03-13 PROCEDURE — 2720000010 HC SURG SUPPLY STERILE: Performed by: SURGERY

## 2025-03-13 PROCEDURE — 6360000002 HC RX W HCPCS: Performed by: ANESTHESIOLOGY

## 2025-03-13 PROCEDURE — 7100000001 HC PACU RECOVERY - ADDTL 15 MIN: Performed by: SURGERY

## 2025-03-13 PROCEDURE — 2580000003 HC RX 258

## 2025-03-13 PROCEDURE — 3600000009 HC SURGERY ROBOT BASE: Performed by: SURGERY

## 2025-03-13 PROCEDURE — 81025 URINE PREGNANCY TEST: CPT

## 2025-03-13 PROCEDURE — C1781 MESH (IMPLANTABLE): HCPCS | Performed by: SURGERY

## 2025-03-13 PROCEDURE — 2500000003 HC RX 250 WO HCPCS

## 2025-03-13 PROCEDURE — 3700000001 HC ADD 15 MINUTES (ANESTHESIA): Performed by: SURGERY

## 2025-03-13 PROCEDURE — 6370000000 HC RX 637 (ALT 250 FOR IP): Performed by: SURGERY

## 2025-03-13 PROCEDURE — 2580000003 HC RX 258: Performed by: SURGERY

## 2025-03-13 PROCEDURE — G0378 HOSPITAL OBSERVATION PER HR: HCPCS

## 2025-03-13 PROCEDURE — S2900 ROBOTIC SURGICAL SYSTEM: HCPCS | Performed by: SURGERY

## 2025-03-13 PROCEDURE — 94760 N-INVAS EAR/PLS OXIMETRY 1: CPT

## 2025-03-13 PROCEDURE — 2500000003 HC RX 250 WO HCPCS: Performed by: SURGERY

## 2025-03-13 PROCEDURE — 6360000002 HC RX W HCPCS

## 2025-03-13 PROCEDURE — 2709999900 HC NON-CHARGEABLE SUPPLY: Performed by: SURGERY

## 2025-03-13 PROCEDURE — 3600000019 HC SURGERY ROBOT ADDTL 15MIN: Performed by: SURGERY

## 2025-03-13 PROCEDURE — 3700000000 HC ANESTHESIA ATTENDED CARE: Performed by: SURGERY

## 2025-03-13 PROCEDURE — 2700000000 HC OXYGEN THERAPY PER DAY

## 2025-03-13 PROCEDURE — 82962 GLUCOSE BLOOD TEST: CPT

## 2025-03-13 DEVICE — STAPLE SKIN LN REINF 60 MM ECHELON ENDOPATH: Type: IMPLANTABLE DEVICE | Site: ABDOMEN | Status: FUNCTIONAL

## 2025-03-13 RX ORDER — MIDAZOLAM HYDROCHLORIDE 2 MG/2ML
2 INJECTION, SOLUTION INTRAMUSCULAR; INTRAVENOUS PRN
Status: DISCONTINUED | OUTPATIENT
Start: 2025-03-13 | End: 2025-03-13 | Stop reason: HOSPADM

## 2025-03-13 RX ORDER — FENTANYL CITRATE 50 UG/ML
INJECTION, SOLUTION INTRAMUSCULAR; INTRAVENOUS
Status: DISCONTINUED | OUTPATIENT
Start: 2025-03-13 | End: 2025-03-13 | Stop reason: SDUPTHER

## 2025-03-13 RX ORDER — HYDROMORPHONE HYDROCHLORIDE 1 MG/ML
1 INJECTION, SOLUTION INTRAMUSCULAR; INTRAVENOUS; SUBCUTANEOUS
Status: DISCONTINUED | OUTPATIENT
Start: 2025-03-13 | End: 2025-03-14 | Stop reason: HOSPADM

## 2025-03-13 RX ORDER — ATORVASTATIN CALCIUM 10 MG/1
20 TABLET, FILM COATED ORAL
Status: DISCONTINUED | OUTPATIENT
Start: 2025-03-13 | End: 2025-03-14 | Stop reason: HOSPADM

## 2025-03-13 RX ORDER — BUPIVACAINE HYDROCHLORIDE AND EPINEPHRINE 5; 5 MG/ML; UG/ML
INJECTION, SOLUTION PERINEURAL PRN
Status: DISCONTINUED | OUTPATIENT
Start: 2025-03-13 | End: 2025-03-13 | Stop reason: HOSPADM

## 2025-03-13 RX ORDER — CYCLOBENZAPRINE HCL 10 MG
10 TABLET ORAL 3 TIMES DAILY PRN
Status: DISCONTINUED | OUTPATIENT
Start: 2025-03-13 | End: 2025-03-14

## 2025-03-13 RX ORDER — HYDROMORPHONE HYDROCHLORIDE 2 MG/ML
INJECTION, SOLUTION INTRAMUSCULAR; INTRAVENOUS; SUBCUTANEOUS
Status: DISCONTINUED | OUTPATIENT
Start: 2025-03-13 | End: 2025-03-13 | Stop reason: SDUPTHER

## 2025-03-13 RX ORDER — OXYCODONE HYDROCHLORIDE 5 MG/1
5 TABLET ORAL
Status: DISCONTINUED | OUTPATIENT
Start: 2025-03-13 | End: 2025-03-13 | Stop reason: HOSPADM

## 2025-03-13 RX ORDER — ROCURONIUM BROMIDE 10 MG/ML
INJECTION, SOLUTION INTRAVENOUS
Status: DISCONTINUED | OUTPATIENT
Start: 2025-03-13 | End: 2025-03-13 | Stop reason: SDUPTHER

## 2025-03-13 RX ORDER — THIAMINE HYDROCHLORIDE 100 MG/ML
100 INJECTION, SOLUTION INTRAMUSCULAR; INTRAVENOUS ONCE
Status: COMPLETED | OUTPATIENT
Start: 2025-03-14 | End: 2025-03-14

## 2025-03-13 RX ORDER — GABAPENTIN 250 MG/5ML
500 SOLUTION ORAL
Status: DISCONTINUED | OUTPATIENT
Start: 2025-03-13 | End: 2025-03-13 | Stop reason: HOSPADM

## 2025-03-13 RX ORDER — HYOSCYAMINE SULFATE 0.12 MG/1
0.12 TABLET SUBLINGUAL EVERY 4 HOURS PRN
Status: DISCONTINUED | OUTPATIENT
Start: 2025-03-13 | End: 2025-03-14 | Stop reason: HOSPADM

## 2025-03-13 RX ORDER — SODIUM CHLORIDE AND POTASSIUM CHLORIDE 150; 450 MG/100ML; MG/100ML
100 INJECTION, SOLUTION INTRAVENOUS CONTINUOUS
Status: DISCONTINUED | OUTPATIENT
Start: 2025-03-13 | End: 2025-03-14 | Stop reason: HOSPADM

## 2025-03-13 RX ORDER — FENTANYL CITRATE 50 UG/ML
100 INJECTION, SOLUTION INTRAMUSCULAR; INTRAVENOUS
Status: DISCONTINUED | OUTPATIENT
Start: 2025-03-13 | End: 2025-03-13 | Stop reason: HOSPADM

## 2025-03-13 RX ORDER — SODIUM CHLORIDE 9 MG/ML
INJECTION, SOLUTION INTRAVENOUS PRN
Status: DISCONTINUED | OUTPATIENT
Start: 2025-03-13 | End: 2025-03-13 | Stop reason: HOSPADM

## 2025-03-13 RX ORDER — PROPOFOL 10 MG/ML
INJECTION, EMULSION INTRAVENOUS
Status: DISCONTINUED | OUTPATIENT
Start: 2025-03-13 | End: 2025-03-13 | Stop reason: SDUPTHER

## 2025-03-13 RX ORDER — DEXAMETHASONE SODIUM PHOSPHATE 4 MG/ML
INJECTION, SOLUTION INTRA-ARTICULAR; INTRALESIONAL; INTRAMUSCULAR; INTRAVENOUS; SOFT TISSUE
Status: DISCONTINUED | OUTPATIENT
Start: 2025-03-13 | End: 2025-03-13 | Stop reason: SDUPTHER

## 2025-03-13 RX ORDER — SODIUM CHLORIDE 0.9 % (FLUSH) 0.9 %
5-40 SYRINGE (ML) INJECTION PRN
Status: DISCONTINUED | OUTPATIENT
Start: 2025-03-13 | End: 2025-03-13 | Stop reason: HOSPADM

## 2025-03-13 RX ORDER — SODIUM CHLORIDE 0.9 % (FLUSH) 0.9 %
5-40 SYRINGE (ML) INJECTION EVERY 12 HOURS SCHEDULED
Status: DISCONTINUED | OUTPATIENT
Start: 2025-03-13 | End: 2025-03-13 | Stop reason: HOSPADM

## 2025-03-13 RX ORDER — SIMETHICONE 80 MG
125 TABLET,CHEWABLE ORAL EVERY 6 HOURS PRN
Status: DISCONTINUED | OUTPATIENT
Start: 2025-03-13 | End: 2025-03-14 | Stop reason: HOSPADM

## 2025-03-13 RX ORDER — SODIUM CHLORIDE, SODIUM LACTATE, POTASSIUM CHLORIDE, CALCIUM CHLORIDE 600; 310; 30; 20 MG/100ML; MG/100ML; MG/100ML; MG/100ML
INJECTION, SOLUTION INTRAVENOUS CONTINUOUS
Status: DISCONTINUED | OUTPATIENT
Start: 2025-03-13 | End: 2025-03-13 | Stop reason: HOSPADM

## 2025-03-13 RX ORDER — GABAPENTIN 250 MG/5ML
250 SOLUTION ORAL EVERY 12 HOURS SCHEDULED
Status: DISCONTINUED | OUTPATIENT
Start: 2025-03-13 | End: 2025-03-14 | Stop reason: HOSPADM

## 2025-03-13 RX ORDER — ONDANSETRON 2 MG/ML
INJECTION INTRAMUSCULAR; INTRAVENOUS
Status: DISCONTINUED | OUTPATIENT
Start: 2025-03-13 | End: 2025-03-13 | Stop reason: SDUPTHER

## 2025-03-13 RX ORDER — METRONIDAZOLE 500 MG/100ML
500 INJECTION, SOLUTION INTRAVENOUS
Status: COMPLETED | OUTPATIENT
Start: 2025-03-13 | End: 2025-03-13

## 2025-03-13 RX ORDER — CYANOCOBALAMIN 1000 UG/ML
1000 INJECTION, SOLUTION INTRAMUSCULAR; SUBCUTANEOUS ONCE
Status: COMPLETED | OUTPATIENT
Start: 2025-03-14 | End: 2025-03-14

## 2025-03-13 RX ORDER — ONDANSETRON 4 MG/1
4 TABLET, ORALLY DISINTEGRATING ORAL EVERY 8 HOURS PRN
Status: DISCONTINUED | OUTPATIENT
Start: 2025-03-13 | End: 2025-03-14 | Stop reason: HOSPADM

## 2025-03-13 RX ORDER — DEXMEDETOMIDINE HYDROCHLORIDE 100 UG/ML
INJECTION, SOLUTION INTRAVENOUS
Status: DISCONTINUED | OUTPATIENT
Start: 2025-03-13 | End: 2025-03-13 | Stop reason: SDUPTHER

## 2025-03-13 RX ORDER — LEVOTHYROXINE SODIUM 125 UG/1
125 TABLET ORAL DAILY
Status: DISCONTINUED | OUTPATIENT
Start: 2025-03-14 | End: 2025-03-14 | Stop reason: HOSPADM

## 2025-03-13 RX ORDER — HYDROXYZINE HYDROCHLORIDE 10 MG/1
10 TABLET, FILM COATED ORAL EVERY 8 HOURS PRN
Status: DISCONTINUED | OUTPATIENT
Start: 2025-03-13 | End: 2025-03-14 | Stop reason: HOSPADM

## 2025-03-13 RX ORDER — ENOXAPARIN SODIUM 100 MG/ML
40 INJECTION SUBCUTANEOUS ONCE
Status: COMPLETED | OUTPATIENT
Start: 2025-03-13 | End: 2025-03-13

## 2025-03-13 RX ORDER — PROCHLORPERAZINE EDISYLATE 5 MG/ML
5 INJECTION INTRAMUSCULAR; INTRAVENOUS
Status: DISCONTINUED | OUTPATIENT
Start: 2025-03-13 | End: 2025-03-13 | Stop reason: HOSPADM

## 2025-03-13 RX ORDER — EPHEDRINE SULFATE 50 MG/ML
INJECTION INTRAVENOUS
Status: DISCONTINUED | OUTPATIENT
Start: 2025-03-13 | End: 2025-03-13 | Stop reason: SDUPTHER

## 2025-03-13 RX ORDER — SCOPOLAMINE 1 MG/3D
1 PATCH, EXTENDED RELEASE TRANSDERMAL
Status: DISCONTINUED | OUTPATIENT
Start: 2025-03-13 | End: 2025-03-13

## 2025-03-13 RX ORDER — PROCHLORPERAZINE EDISYLATE 5 MG/ML
10 INJECTION INTRAMUSCULAR; INTRAVENOUS EVERY 6 HOURS PRN
Status: DISCONTINUED | OUTPATIENT
Start: 2025-03-13 | End: 2025-03-14 | Stop reason: HOSPADM

## 2025-03-13 RX ORDER — SODIUM CHLORIDE 0.9 % (FLUSH) 0.9 %
5-40 SYRINGE (ML) INJECTION PRN
Status: DISCONTINUED | OUTPATIENT
Start: 2025-03-13 | End: 2025-03-14 | Stop reason: HOSPADM

## 2025-03-13 RX ORDER — SODIUM CHLORIDE 0.9 % (FLUSH) 0.9 %
5-40 SYRINGE (ML) INJECTION EVERY 12 HOURS SCHEDULED
Status: DISCONTINUED | OUTPATIENT
Start: 2025-03-13 | End: 2025-03-14 | Stop reason: HOSPADM

## 2025-03-13 RX ORDER — ACETAMINOPHEN 160 MG/5ML
1000 LIQUID ORAL EVERY 6 HOURS PRN
Status: DISCONTINUED | OUTPATIENT
Start: 2025-03-13 | End: 2025-03-14 | Stop reason: HOSPADM

## 2025-03-13 RX ORDER — SCOPOLAMINE 1 MG/3D
1 PATCH, EXTENDED RELEASE TRANSDERMAL
Status: DISCONTINUED | OUTPATIENT
Start: 2025-03-13 | End: 2025-03-14 | Stop reason: HOSPADM

## 2025-03-13 RX ORDER — ACETAMINOPHEN 160 MG/5ML
1000 LIQUID ORAL
Status: DISCONTINUED | OUTPATIENT
Start: 2025-03-13 | End: 2025-03-13 | Stop reason: HOSPADM

## 2025-03-13 RX ORDER — NALOXONE HYDROCHLORIDE 0.4 MG/ML
INJECTION, SOLUTION INTRAMUSCULAR; INTRAVENOUS; SUBCUTANEOUS PRN
Status: DISCONTINUED | OUTPATIENT
Start: 2025-03-13 | End: 2025-03-13 | Stop reason: HOSPADM

## 2025-03-13 RX ORDER — MIDAZOLAM HYDROCHLORIDE 1 MG/ML
INJECTION, SOLUTION INTRAMUSCULAR; INTRAVENOUS
Status: DISCONTINUED | OUTPATIENT
Start: 2025-03-13 | End: 2025-03-13 | Stop reason: SDUPTHER

## 2025-03-13 RX ORDER — ENOXAPARIN SODIUM 100 MG/ML
40 INJECTION SUBCUTANEOUS EVERY 12 HOURS SCHEDULED
Status: DISCONTINUED | OUTPATIENT
Start: 2025-03-14 | End: 2025-03-14 | Stop reason: HOSPADM

## 2025-03-13 RX ORDER — HYDROMORPHONE HYDROCHLORIDE 1 MG/ML
0.5 INJECTION, SOLUTION INTRAMUSCULAR; INTRAVENOUS; SUBCUTANEOUS EVERY 5 MIN PRN
Status: DISCONTINUED | OUTPATIENT
Start: 2025-03-13 | End: 2025-03-13 | Stop reason: HOSPADM

## 2025-03-13 RX ORDER — ONDANSETRON 2 MG/ML
4 INJECTION INTRAMUSCULAR; INTRAVENOUS
Status: COMPLETED | OUTPATIENT
Start: 2025-03-13 | End: 2025-03-13

## 2025-03-13 RX ORDER — SUCCINYLCHOLINE CHLORIDE 20 MG/ML
INJECTION INTRAMUSCULAR; INTRAVENOUS
Status: DISCONTINUED | OUTPATIENT
Start: 2025-03-13 | End: 2025-03-13 | Stop reason: SDUPTHER

## 2025-03-13 RX ORDER — ONDANSETRON 2 MG/ML
4 INJECTION INTRAMUSCULAR; INTRAVENOUS EVERY 6 HOURS PRN
Status: DISCONTINUED | OUTPATIENT
Start: 2025-03-13 | End: 2025-03-14 | Stop reason: HOSPADM

## 2025-03-13 RX ORDER — LIDOCAINE HYDROCHLORIDE 20 MG/ML
INJECTION, SOLUTION EPIDURAL; INFILTRATION; INTRACAUDAL; PERINEURAL
Status: DISCONTINUED | OUTPATIENT
Start: 2025-03-13 | End: 2025-03-13 | Stop reason: SDUPTHER

## 2025-03-13 RX ORDER — SODIUM CHLORIDE 9 MG/ML
INJECTION, SOLUTION INTRAVENOUS PRN
Status: DISCONTINUED | OUTPATIENT
Start: 2025-03-13 | End: 2025-03-14 | Stop reason: HOSPADM

## 2025-03-13 RX ORDER — HYDROMORPHONE HYDROCHLORIDE 4 MG/1
2 TABLET ORAL EVERY 4 HOURS PRN
Status: DISCONTINUED | OUTPATIENT
Start: 2025-03-13 | End: 2025-03-14 | Stop reason: HOSPADM

## 2025-03-13 RX ORDER — LIDOCAINE HYDROCHLORIDE 10 MG/ML
1 INJECTION, SOLUTION EPIDURAL; INFILTRATION; INTRACAUDAL; PERINEURAL
Status: DISCONTINUED | OUTPATIENT
Start: 2025-03-13 | End: 2025-03-13 | Stop reason: HOSPADM

## 2025-03-13 RX ORDER — DIPHENHYDRAMINE HYDROCHLORIDE 50 MG/ML
25 INJECTION, SOLUTION INTRAMUSCULAR; INTRAVENOUS EVERY 6 HOURS PRN
Status: DISCONTINUED | OUTPATIENT
Start: 2025-03-13 | End: 2025-03-14 | Stop reason: HOSPADM

## 2025-03-13 RX ORDER — HYDRALAZINE HYDROCHLORIDE 20 MG/ML
20 INJECTION INTRAMUSCULAR; INTRAVENOUS EVERY 4 HOURS PRN
Status: DISCONTINUED | OUTPATIENT
Start: 2025-03-13 | End: 2025-03-14 | Stop reason: HOSPADM

## 2025-03-13 RX ORDER — METOCLOPRAMIDE HYDROCHLORIDE 5 MG/ML
10 INJECTION INTRAMUSCULAR; INTRAVENOUS EVERY 6 HOURS PRN
Status: DISCONTINUED | OUTPATIENT
Start: 2025-03-13 | End: 2025-03-14 | Stop reason: HOSPADM

## 2025-03-13 RX ORDER — HYDRALAZINE HYDROCHLORIDE 20 MG/ML
10 INJECTION INTRAMUSCULAR; INTRAVENOUS ONCE
Status: DISCONTINUED | OUTPATIENT
Start: 2025-03-13 | End: 2025-03-13 | Stop reason: HOSPADM

## 2025-03-13 RX ORDER — HYDROMORPHONE HYDROCHLORIDE 4 MG/1
4 TABLET ORAL EVERY 4 HOURS PRN
Status: DISCONTINUED | OUTPATIENT
Start: 2025-03-13 | End: 2025-03-14 | Stop reason: HOSPADM

## 2025-03-13 RX ORDER — FENTANYL CITRATE 50 UG/ML
25 INJECTION, SOLUTION INTRAMUSCULAR; INTRAVENOUS EVERY 5 MIN PRN
Status: COMPLETED | OUTPATIENT
Start: 2025-03-13 | End: 2025-03-13

## 2025-03-13 RX ADMIN — PROCHLORPERAZINE EDISYLATE 10 MG: 5 INJECTION INTRAMUSCULAR; INTRAVENOUS at 12:09

## 2025-03-13 RX ADMIN — FENTANYL CITRATE 100 MCG: 50 INJECTION INTRAMUSCULAR; INTRAVENOUS at 07:37

## 2025-03-13 RX ADMIN — PROPOFOL 200 MG: 10 INJECTION, EMULSION INTRAVENOUS at 07:37

## 2025-03-13 RX ADMIN — Medication 25 MG: at 07:58

## 2025-03-13 RX ADMIN — ROCURONIUM BROMIDE 10 MG: 50 INJECTION INTRAVENOUS at 08:57

## 2025-03-13 RX ADMIN — ONDANSETRON 4 MG: 2 INJECTION, SOLUTION INTRAMUSCULAR; INTRAVENOUS at 09:09

## 2025-03-13 RX ADMIN — SUGAMMADEX 300 MG: 100 INJECTION, SOLUTION INTRAVENOUS at 09:18

## 2025-03-13 RX ADMIN — PROCHLORPERAZINE EDISYLATE 10 MG: 5 INJECTION INTRAMUSCULAR; INTRAVENOUS at 23:45

## 2025-03-13 RX ADMIN — POTASSIUM CHLORIDE AND SODIUM CHLORIDE 100 ML/HR: 450; 150 INJECTION, SOLUTION INTRAVENOUS at 21:29

## 2025-03-13 RX ADMIN — MIDAZOLAM HYDROCHLORIDE 5 MG: 1 INJECTION, SOLUTION INTRAMUSCULAR; INTRAVENOUS at 07:27

## 2025-03-13 RX ADMIN — HYDROMORPHONE HYDROCHLORIDE 1 MG: 1 INJECTION, SOLUTION INTRAMUSCULAR; INTRAVENOUS; SUBCUTANEOUS at 18:17

## 2025-03-13 RX ADMIN — Medication 250 MG: at 21:30

## 2025-03-13 RX ADMIN — LIDOCAINE HYDROCHLORIDE 100 MG: 20 INJECTION, SOLUTION EPIDURAL; INFILTRATION; INTRACAUDAL; PERINEURAL at 07:37

## 2025-03-13 RX ADMIN — FENTANYL CITRATE 25 MCG: 50 INJECTION INTRAMUSCULAR; INTRAVENOUS at 10:00

## 2025-03-13 RX ADMIN — ONDANSETRON 4 MG: 2 INJECTION, SOLUTION INTRAMUSCULAR; INTRAVENOUS at 18:23

## 2025-03-13 RX ADMIN — PHENYLEPHRINE HYDROCHLORIDE 120 MCG: 10 INJECTION INTRAVENOUS at 08:06

## 2025-03-13 RX ADMIN — HYDROMORPHONE HYDROCHLORIDE 0.2 MG: 2 INJECTION, SOLUTION INTRAMUSCULAR; INTRAVENOUS; SUBCUTANEOUS at 09:20

## 2025-03-13 RX ADMIN — DEXMEDETOMIDINE 4 MCG: 100 INJECTION, SOLUTION, CONCENTRATE INTRAVENOUS at 08:16

## 2025-03-13 RX ADMIN — EPHEDRINE SULFATE 10 MG: 50 INJECTION INTRAVENOUS at 08:10

## 2025-03-13 RX ADMIN — ACETAMINOPHEN 1000 MG: 650 SOLUTION ORAL at 06:54

## 2025-03-13 RX ADMIN — FENTANYL CITRATE 25 MCG: 50 INJECTION INTRAMUSCULAR; INTRAVENOUS at 10:43

## 2025-03-13 RX ADMIN — PROPOFOL 20 MG: 10 INJECTION, EMULSION INTRAVENOUS at 09:32

## 2025-03-13 RX ADMIN — METRONIDAZOLE 500 MG: 5 INJECTION, SOLUTION INTRAVENOUS at 07:42

## 2025-03-13 RX ADMIN — HYDROMORPHONE HYDROCHLORIDE 1 MG: 1 INJECTION, SOLUTION INTRAMUSCULAR; INTRAVENOUS; SUBCUTANEOUS at 22:37

## 2025-03-13 RX ADMIN — FENTANYL CITRATE 25 MCG: 50 INJECTION INTRAMUSCULAR; INTRAVENOUS at 10:20

## 2025-03-13 RX ADMIN — DEXMEDETOMIDINE 8 MCG: 100 INJECTION, SOLUTION, CONCENTRATE INTRAVENOUS at 08:19

## 2025-03-13 RX ADMIN — GABAPENTIN 500 MG: 250 SOLUTION ORAL at 06:54

## 2025-03-13 RX ADMIN — SODIUM CHLORIDE, SODIUM LACTATE, POTASSIUM CHLORIDE, AND CALCIUM CHLORIDE: .6; .31; .03; .02 INJECTION, SOLUTION INTRAVENOUS at 07:01

## 2025-03-13 RX ADMIN — PHENYLEPHRINE HYDROCHLORIDE 120 MCG: 10 INJECTION INTRAVENOUS at 08:04

## 2025-03-13 RX ADMIN — ONDANSETRON 4 MG: 2 INJECTION, SOLUTION INTRAMUSCULAR; INTRAVENOUS at 10:08

## 2025-03-13 RX ADMIN — ROCURONIUM BROMIDE 10 MG: 50 INJECTION INTRAVENOUS at 07:37

## 2025-03-13 RX ADMIN — EPHEDRINE SULFATE 5 MG: 50 INJECTION INTRAVENOUS at 08:35

## 2025-03-13 RX ADMIN — SUCCINYLCHOLINE CHLORIDE 160 MG: 20 INJECTION, SOLUTION INTRAMUSCULAR; INTRAVENOUS at 07:37

## 2025-03-13 RX ADMIN — ATORVASTATIN CALCIUM 20 MG: 10 TABLET, FILM COATED ORAL at 21:30

## 2025-03-13 RX ADMIN — HYDROMORPHONE HYDROCHLORIDE 1 MG: 1 INJECTION, SOLUTION INTRAMUSCULAR; INTRAVENOUS; SUBCUTANEOUS at 12:10

## 2025-03-13 RX ADMIN — SIMETHICONE 120 MG: 80 TABLET, CHEWABLE ORAL at 12:12

## 2025-03-13 RX ADMIN — SODIUM CHLORIDE 3000 MG: 9 INJECTION, SOLUTION INTRAVENOUS at 07:49

## 2025-03-13 RX ADMIN — POTASSIUM CHLORIDE AND SODIUM CHLORIDE 100 ML/HR: 450; 150 INJECTION, SOLUTION INTRAVENOUS at 10:14

## 2025-03-13 RX ADMIN — ROCURONIUM BROMIDE 10 MG: 50 INJECTION INTRAVENOUS at 08:13

## 2025-03-13 RX ADMIN — FENTANYL CITRATE 25 MCG: 50 INJECTION INTRAMUSCULAR; INTRAVENOUS at 10:13

## 2025-03-13 RX ADMIN — DEXAMETHASONE SODIUM PHOSPHATE 8 MG: 4 INJECTION INTRA-ARTICULAR; INTRALESIONAL; INTRAMUSCULAR; INTRAVENOUS; SOFT TISSUE at 07:37

## 2025-03-13 RX ADMIN — HYDROMORPHONE HYDROCHLORIDE 0.8 MG: 2 INJECTION, SOLUTION INTRAMUSCULAR; INTRAVENOUS; SUBCUTANEOUS at 09:32

## 2025-03-13 RX ADMIN — ROCURONIUM BROMIDE 30 MG: 50 INJECTION INTRAVENOUS at 07:42

## 2025-03-13 RX ADMIN — ENOXAPARIN SODIUM 40 MG: 100 INJECTION SUBCUTANEOUS at 06:54

## 2025-03-13 RX ADMIN — PHENYLEPHRINE HYDROCHLORIDE 40 MCG: 10 INJECTION INTRAVENOUS at 07:52

## 2025-03-13 RX ADMIN — ROCURONIUM BROMIDE 5 MG: 50 INJECTION INTRAVENOUS at 08:39

## 2025-03-13 ASSESSMENT — PAIN DESCRIPTION - ORIENTATION
ORIENTATION: ANTERIOR
ORIENTATION: RIGHT;LEFT
ORIENTATION: ANTERIOR
ORIENTATION: ANTERIOR

## 2025-03-13 ASSESSMENT — PAIN DESCRIPTION - LOCATION
LOCATION: ABDOMEN

## 2025-03-13 ASSESSMENT — PAIN SCALES - GENERAL
PAINLEVEL_OUTOF10: 6
PAINLEVEL_OUTOF10: 7
PAINLEVEL_OUTOF10: 7
PAINLEVEL_OUTOF10: 5
PAINLEVEL_OUTOF10: 7
PAINLEVEL_OUTOF10: 7
PAINLEVEL_OUTOF10: 0
PAINLEVEL_OUTOF10: 6

## 2025-03-13 ASSESSMENT — PAIN DESCRIPTION - DESCRIPTORS
DESCRIPTORS: ACHING;CRAMPING;SORE;TENDER
DESCRIPTORS: ACHING
DESCRIPTORS: ACHING;SHARP;CRAMPING
DESCRIPTORS: ACHING
DESCRIPTORS: ACHING
DESCRIPTORS: CRAMPING;SHARP;ACHING

## 2025-03-13 ASSESSMENT — PAIN - FUNCTIONAL ASSESSMENT: PAIN_FUNCTIONAL_ASSESSMENT: 0-10

## 2025-03-13 NOTE — PERIOP NOTE
TRANSFER - OUT REPORT:    Verbal report given to Lolis RN(name) on Cordelia Whitman  being transferred to  (unit) for routine post-op       Report consisted of patient’s Situation, Background, Assessment and   Recommendations(SBAR).     Time Pre op antibiotic given:0742    Anesthesia Stop time: 0933    Information from the following report(s) SBAR, OR Summary, Intake/Output, and MAR was reviewed with the receiving nurse.    Opportunity for questions and clarification was provided.     Is the patient on 02? Yes       L/Min 2       Other     Is the patient on a monitor? Yes    Is the nurse transporting with the patient? Yes    At transfer, are there Patient Belongings with the patient?  If Yes, please note/list: clothes    Surgical Waiting Area notified of patient's transfer from PACU? Yes

## 2025-03-13 NOTE — OP NOTE
OPERATIVE NOTE    Date of Procedure: 3/13/2025     Preoperative Diagnosis: Morbid obesity [E66.01]  Postoperative Diagnosis: Post-Op Diagnosis Codes:     * Morbid obesity [E66.01]      Procedure: Procedure(s):  ROBOTIC GASTRIC BYPASS, ESOPHAGOGASTRODUODENOSCOPY (EGD) (E R A S)    Surgeon: Neal Ross MD  Assistant(s): HITESH Kinsey - They were critically important in the exposure and key portions of the case including entry, exposure, instrument exchange, and closure of the abdomen.  Surgical Staff: Circulator: Rosita Gruber RN  Scrub Person First: Mary Kay Pina  Physician Assistant: Tanisha Kaur PA  Relief Surgical Assistant: Angélica Mccarthy    Anesthesia: General   Indications: 29-year-old female presents with morbid obesity here for gastric bypass  Findings: Morbid obesity, intra-abdominal normal findings, resolved Rene's    Description of Operation: Cordelia Whitman was identified in the pre-operative holding area. Informed consent was obtained after a complete discussion of risks, benefits and alternatives to surgery were had with the patient.    The patient was brought back to the operating room and placed under general endotracheal anesthesia in the supine position on the operating room table. The patient was then prepped and draped in the usual sterile fashion. A timeout was performed.       We then injected local anesthetic 15 cm below the Xiphoid to the left of midline. A 5 mm incision was then made using an 11 blade. We entered the abdomen safely with a 5 mm trocar and insufflated the abdomen. I placed two 8 mm trochars in the left mid abdomen followed by a 12 mm trocar on the right mid abdomen and an 8 mm trocar laterally. We then upsized the initial entry trocar to an 8 trocar. We placed the patient in reverse Trendelenburg. We then inserted the Huong retractor in the epigastrium.    We then docked the robot.     Initial inspection did not demonstrate a hiatal hernia. We began  our dissection by taking down the Angle of His using the vessel sealer. We then counted 6 cm down from the GE junction and created a perigastric window bluntly. We then used a 60 mm blue Sureform stapler load with reinforcement to transect the stomach. Next, we used multiple reinforced blue loads to transect the lateral margin of this gastric pouch. This was done using a 40 Yoruba VISIGI to size the pouch. There was good hemostasis.    Next, we used the vessel sealer to divide the omentum down to the transverse colon.    Next we identified the ligament of Treitz. While ensuring proper orientation we counted 50 centimeters. We then brought this portion up to the gastric pouch. I used an interlocked 3-0 STRATAFIX absorbable suture to line up the loop to the gastric pouch.  I then once again confirmed proper orientation of the BP and Milton limb. Next I used the robotic scissors with energy to create an enterotomy and gastrotomy. I then used two 3-0 STRATAFIX absorbable suture linked together to perform the inner layered closure of the gastroenterotomy. This was done over a VISIGI to ensure no stricture. The second arm of the linked posterior 3-0 STRATAFIX absorbable suture was then run anteriorly to complete the double layer closure. Once this was done we removed the VISIGI. We used a 60 mm reinforced white load to transect the BP limb from the loop.     We then counted down on the Milton limb 125 cm. At this point we created an enterotomy with the scissors. We then created an enterotomy on the BP limb. We used a white stapler load 60 mm to anastomose the common channel to the BP limb in a side to side fashion. The common enterotomy was closed with 3-0 STRATAFIX absorbable suture in a double layered fashion. I then used a 2-0 permanent V loc to close the JJ mesenteric defect.    We then placed the patient in the supine position. I moved the Milton limb medially and elevated the transverse colon to expose the Petersons

## 2025-03-13 NOTE — ANESTHESIA POSTPROCEDURE EVALUATION
Post-Anesthesia Evaluation and Assessment    Patient: Cordelia Whitman MRN: [id number]  SSN: [id number]    YOB: 1995  Age: 29 y.o.  Sex: female      I have evaluated the patient and they are stable and ready for discharge from the PACU.     Cardiovascular Function/Vital Signs  Visit Vitals  BP (!) 114/56   Pulse 81   Temp 97.5 °F (36.4 °C)   Resp 16   Wt (!) 149.6 kg (329 lb 12.9 oz)   SpO2 93%   BMI 54.88 kg/m²       Patient is status post General anesthesia for Procedure(s):  ROBOTIC GASTRIC BYPASS, ESOPHAGOGASTRODUODENOSCOPY (EGD) (E R A S).    Nausea/Vomiting: None    Postoperative hydration reviewed and adequate.    Pain:  Managed    Neurological Status:   At baseline    Mental Status, Level of Consciousness: Alert and  oriented to person, place, and time    Pulmonary Status:   Adequate oxygenation and airway patent    Complications related to anesthesia: None    Post-anesthesia assessment completed. No concerns    Signed By: Arsenio Patton MD     March 13, 2025

## 2025-03-13 NOTE — INTERVAL H&P NOTE
Update History & Physical    The patient's History and Physical of 3/3/25 was reviewed with the patient and I examined the patient. There was no change. The surgical site was confirmed by the patient and me.     Plan: The risks, benefits, expected outcome, and alternative to the recommended procedure have been discussed with the patient. Patient understands and wants to proceed with the procedure.     Electronically signed by Neal Ross MD on 3/13/2025 at 7:03 AM

## 2025-03-13 NOTE — ANESTHESIA PRE PROCEDURE
Department of Anesthesiology  Preprocedure Note       Name:  Cordelia Whitman   Age:  29 y.o.  :  1995                                          MRN:  [id number]         Date:  3/13/2025      Surgeon: Surgeon(s):  Neal Ross MD    Procedure: Procedure(s):  ROBOTIC GASTRIC BYPASS, ESOPHAGOGASTRODUODENOSCOPY (EGD) (E R A S)    Medications prior to admission:   Prior to Admission medications    Medication Sig Start Date End Date Taking? Authorizing Provider   clindamycin 1 % gel Apply topically as needed 25  Yes Obed Morales MD   levothyroxine (SYNTHROID) 112 MCG tablet Take 1 tablet by mouth daily  Patient taking differently: Take 125 mcg by mouth daily 10/28/24  Yes Marek Lyon PA-C   polyethylene glycol (GLYCOLAX) 17 GM/SCOOP powder Take 17 g by mouth daily 25  Claudine Santana APRN - NP   ondansetron (ZOFRAN) 4 MG tablet Take 1 tablet by mouth every 8 hours as needed for Nausea or Vomiting 25   Claudine Santana APRN - NP   gabapentin (NEURONTIN) 100 MG capsule Take 1-2 capsules by mouth 3 times daily for 5 days. Max Daily Amount: 600 mg 2/26/25 3/3/25  Claudine Santana APRN - NP   omeprazole (PRILOSEC) 40 MG delayed release capsule Take 1 capsule by mouth every morning (before breakfast) 25   Claudine Santana APRN - NP   enoxaparin (LOVENOX) 40 MG/0.4ML Inject 0.4 mLs into the skin daily AFTER SURGERY 25   Claudine Santana APRN - NP   fluconazole (DIFLUCAN) 150 MG tablet Take 1 tablet by mouth Once a week at 5 PM TAKES ON     Obed Morales MD   atorvastatin (LIPITOR) 20 MG tablet Take 1 tablet by mouth nightly    Obed Morales MD   nystatin (MYCOSTATIN) 270292 UNIT/GM powder APPLY TO AREAS OF RASH ONCE DAILY AS NEEDED. 24   Obed Morales MD   vitamin D (ERGOCALCIFEROL) 1.25 MG (99249 UT) CAPS capsule Take 1 capsule by mouth Twice a Week 24   Oebd Morales MD   FLUoxetine (PROZAC) 40 MG capsule Take 1 capsule by

## 2025-03-14 VITALS
DIASTOLIC BLOOD PRESSURE: 66 MMHG | BODY MASS INDEX: 54.88 KG/M2 | TEMPERATURE: 97.9 F | WEIGHT: 293 LBS | HEART RATE: 79 BPM | OXYGEN SATURATION: 98 % | SYSTOLIC BLOOD PRESSURE: 113 MMHG | RESPIRATION RATE: 16 BRPM

## 2025-03-14 LAB
ANION GAP SERPL CALC-SCNC: 3 MMOL/L (ref 2–12)
BASOPHILS # BLD: 0.01 K/UL (ref 0–0.1)
BASOPHILS NFR BLD: 0.1 % (ref 0–1)
BUN SERPL-MCNC: 6 MG/DL (ref 6–20)
BUN/CREAT SERPL: 8 (ref 12–20)
CALCIUM SERPL-MCNC: 8.8 MG/DL (ref 8.5–10.1)
CHLORIDE SERPL-SCNC: 107 MMOL/L (ref 97–108)
CO2 SERPL-SCNC: 26 MMOL/L (ref 21–32)
CREAT SERPL-MCNC: 0.71 MG/DL (ref 0.55–1.02)
DIFFERENTIAL METHOD BLD: ABNORMAL
EOSINOPHIL # BLD: 0 K/UL (ref 0–0.4)
EOSINOPHIL NFR BLD: 0 % (ref 0–7)
ERYTHROCYTE [DISTWIDTH] IN BLOOD BY AUTOMATED COUNT: 14.2 % (ref 11.5–14.5)
GLUCOSE SERPL-MCNC: 116 MG/DL (ref 65–100)
HCT VFR BLD AUTO: 34.3 % (ref 35–47)
HGB BLD-MCNC: 10.8 G/DL (ref 11.5–16)
IMM GRANULOCYTES # BLD AUTO: 0.07 K/UL (ref 0–0.04)
IMM GRANULOCYTES NFR BLD AUTO: 0.5 % (ref 0–0.5)
LYMPHOCYTES # BLD: 1.46 K/UL (ref 0.8–3.5)
LYMPHOCYTES NFR BLD: 9.9 % (ref 12–49)
MCH RBC QN AUTO: 27.6 PG (ref 26–34)
MCHC RBC AUTO-ENTMCNC: 31.5 G/DL (ref 30–36.5)
MCV RBC AUTO: 87.5 FL (ref 80–99)
MONOCYTES # BLD: 0.73 K/UL (ref 0–1)
MONOCYTES NFR BLD: 5 % (ref 5–13)
NEUTS SEG # BLD: 12.43 K/UL (ref 1.8–8)
NEUTS SEG NFR BLD: 84.5 % (ref 32–75)
NRBC # BLD: 0 K/UL (ref 0–0.01)
NRBC BLD-RTO: 0 PER 100 WBC
PLATELET # BLD AUTO: 336 K/UL (ref 150–400)
PMV BLD AUTO: 9.8 FL (ref 8.9–12.9)
POTASSIUM SERPL-SCNC: 4 MMOL/L (ref 3.5–5.1)
RBC # BLD AUTO: 3.92 M/UL (ref 3.8–5.2)
SODIUM SERPL-SCNC: 136 MMOL/L (ref 136–145)
WBC # BLD AUTO: 14.7 K/UL (ref 3.6–11)

## 2025-03-14 PROCEDURE — 85025 COMPLETE CBC W/AUTO DIFF WBC: CPT

## 2025-03-14 PROCEDURE — 6370000000 HC RX 637 (ALT 250 FOR IP): Performed by: SURGERY

## 2025-03-14 PROCEDURE — 80048 BASIC METABOLIC PNL TOTAL CA: CPT

## 2025-03-14 PROCEDURE — 2500000003 HC RX 250 WO HCPCS: Performed by: SURGERY

## 2025-03-14 PROCEDURE — 2580000003 HC RX 258: Performed by: SURGERY

## 2025-03-14 PROCEDURE — G0378 HOSPITAL OBSERVATION PER HR: HCPCS

## 2025-03-14 PROCEDURE — 6360000002 HC RX W HCPCS: Performed by: SURGERY

## 2025-03-14 RX ORDER — CYCLOBENZAPRINE HCL 10 MG
10 TABLET ORAL 3 TIMES DAILY PRN
Qty: 30 TABLET | Refills: 0 | Status: SHIPPED | OUTPATIENT
Start: 2025-03-14 | End: 2025-03-24

## 2025-03-14 RX ORDER — OXYCODONE HYDROCHLORIDE 5 MG/1
5 TABLET ORAL EVERY 6 HOURS PRN
Qty: 10 TABLET | Refills: 0 | Status: SHIPPED | OUTPATIENT
Start: 2025-03-14 | End: 2025-03-17

## 2025-03-14 RX ORDER — KETOROLAC TROMETHAMINE 30 MG/ML
15 INJECTION, SOLUTION INTRAMUSCULAR; INTRAVENOUS EVERY 6 HOURS
Status: DISCONTINUED | OUTPATIENT
Start: 2025-03-14 | End: 2025-03-14 | Stop reason: HOSPADM

## 2025-03-14 RX ORDER — CYCLOBENZAPRINE HCL 10 MG
10 TABLET ORAL 3 TIMES DAILY
Status: DISCONTINUED | OUTPATIENT
Start: 2025-03-14 | End: 2025-03-14 | Stop reason: HOSPADM

## 2025-03-14 RX ADMIN — CYANOCOBALAMIN 1000 MCG: 1000 INJECTION, SOLUTION INTRAMUSCULAR; SUBCUTANEOUS at 09:01

## 2025-03-14 RX ADMIN — FLUOXETINE HYDROCHLORIDE 40 MG: 20 CAPSULE ORAL at 09:01

## 2025-03-14 RX ADMIN — KETOROLAC TROMETHAMINE 15 MG: 30 INJECTION, SOLUTION INTRAMUSCULAR at 10:35

## 2025-03-14 RX ADMIN — ENOXAPARIN SODIUM 40 MG: 100 INJECTION SUBCUTANEOUS at 09:02

## 2025-03-14 RX ADMIN — KETOROLAC TROMETHAMINE 15 MG: 30 INJECTION, SOLUTION INTRAMUSCULAR at 15:31

## 2025-03-14 RX ADMIN — HYDROMORPHONE HYDROCHLORIDE 4 MG: 4 TABLET ORAL at 15:31

## 2025-03-14 RX ADMIN — Medication 250 MG: at 09:01

## 2025-03-14 RX ADMIN — CYCLOBENZAPRINE 10 MG: 10 TABLET, FILM COATED ORAL at 10:36

## 2025-03-14 RX ADMIN — SODIUM CHLORIDE 10 ML: 9 INJECTION INTRAMUSCULAR; INTRAVENOUS; SUBCUTANEOUS at 09:01

## 2025-03-14 RX ADMIN — HYDROMORPHONE HYDROCHLORIDE 4 MG: 4 TABLET ORAL at 06:28

## 2025-03-14 RX ADMIN — SODIUM CHLORIDE 40 MG: 9 INJECTION INTRAMUSCULAR; INTRAVENOUS; SUBCUTANEOUS at 09:01

## 2025-03-14 RX ADMIN — CYCLOBENZAPRINE 10 MG: 10 TABLET, FILM COATED ORAL at 15:31

## 2025-03-14 RX ADMIN — HYDROMORPHONE HYDROCHLORIDE 4 MG: 4 TABLET ORAL at 10:36

## 2025-03-14 RX ADMIN — LEVOTHYROXINE SODIUM 125 MCG: 0.12 TABLET ORAL at 09:01

## 2025-03-14 RX ADMIN — HYDROMORPHONE HYDROCHLORIDE 1 MG: 1 INJECTION, SOLUTION INTRAMUSCULAR; INTRAVENOUS; SUBCUTANEOUS at 02:48

## 2025-03-14 RX ADMIN — THIAMINE HYDROCHLORIDE 100 MG: 100 INJECTION, SOLUTION INTRAMUSCULAR; INTRAVENOUS at 09:01

## 2025-03-14 ASSESSMENT — PAIN DESCRIPTION - DESCRIPTORS
DESCRIPTORS: ACHING

## 2025-03-14 ASSESSMENT — PAIN DESCRIPTION - LOCATION
LOCATION: ABDOMEN

## 2025-03-14 ASSESSMENT — PAIN DESCRIPTION - ORIENTATION
ORIENTATION: RIGHT;LEFT
ORIENTATION: ANTERIOR
ORIENTATION: RIGHT;LEFT
ORIENTATION: ANTERIOR
ORIENTATION: ANTERIOR

## 2025-03-14 ASSESSMENT — PAIN SCALES - GENERAL
PAINLEVEL_OUTOF10: 7
PAINLEVEL_OUTOF10: 2
PAINLEVEL_OUTOF10: 7
PAINLEVEL_OUTOF10: 7
PAINLEVEL_OUTOF10: 6
PAINLEVEL_OUTOF10: 0
PAINLEVEL_OUTOF10: 6

## 2025-03-14 NOTE — CONSULTS
Nutrition Education    Educated on bariatric postop  Learners: Patient  Readiness: Eager  Method: Explanation and Teachback  Response: Verbalizes Understanding  Contact name and number provided.    28 yo female s/p gastric bypass. Pt acknowledges fluid and protein goal, appropriate fluids to drink, and to follow FLD x 2 weeks. Has protein shakes and vitamins ready at home.   Past Medical History:   Diagnosis Date    Acquired hypothyroidism 3/8/2021    Bipolar affective disorder (HCC)     Diabetes mellitus (HCC)     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    Hepatic steatosis 3/8/2021    Hydradenitis     Hyperlipidemia     Overactive bladder Unknown    Pre-eclampsia [id number]    Psychiatric disorder     anxiety and depression since age 16    Psychiatric problem     depression     Wt Readings from Last 14 Encounters:   03/13/25 (!) 149.6 kg (329 lb 12.9 oz)   03/03/25 (!) 150.2 kg (331 lb 3.2 oz)   02/26/25 (!) 151.5 kg (334 lb)   02/17/25 (!) 152.4 kg (335 lb 14.4 oz)   11/19/24 (!) 151.6 kg (334 lb 3.2 oz)   10/25/24 (!) 153.4 kg (338 lb 3.2 oz)   09/03/24 (!) 152.4 kg (336 lb)   08/02/24 (!) 152 kg (335 lb)   07/22/24 (!) 152.5 kg (336 lb 3.2 oz)   07/02/24 (!) 153.3 kg (338 lb)   05/07/24 (!) 154.2 kg (340 lb)   05/01/24 (!) 154.2 kg (340 lb)   04/30/24 (!) 155.6 kg (343 lb)   04/22/24 (!) 155.9 kg (343 lb 12.8 oz)     Alexandrea Galvan RD  Contact via [employer]

## 2025-03-14 NOTE — DISCHARGE INSTRUCTIONS
[facility] at [place]  Bariatric Surgery Discharge Instructions     Procedure bypass    Future Appointments   Date Time Provider Department Center   3/27/2025  8:20 AM Claudine Santana APRN - NP [facility] BS AMB   4/10/2025  8:00 AM Claudine Santana APRN - NP [facility] BS AMB   4/25/2025  9:00 AM Neal Ross MD [facility] BS AMB         Contact Information:    [facility] at [address], [address]   [place] [id number] [phone]    After Hours and Weekends  [phone] On Call Surgeon    Non Emergent Medical Needs  Call during office hours or send a message via My Chart   (messages returned during business hours)    DIET    Please remember that you are on ONLY FULL LIQUIDS for the first 2 weeks after surgery.     Do not advance to the next phase until advised by your surgeon or Nurse Practitioner.   Refer to the Bariatric Handbook for detailed information.     TO PREVENT DEHYDRATION:  consume 64 ounces of liquids daily.  At least 64 ounces of that should come from water, Crystal Light, sugar free popsicles, sugar free gelatin or other calorie-free, sugar-free, caffeine free and noncarbonated beverages. Do not drink with a straw.   Sip, sip, sip throughout the day  Main priority is to stay hydrated  Aim for 60 grams of protein every day.  Most of your protein will come from shakes.  Refer to the Bariatric Handbook for detailed information.  Add additional protein supplements to meet protein needs (protein powder, clear protein such as protein water, non-fat dry milk powder, NO protein bars at this at this stage)     MEDICATIONS & VITAMINS    Pre-surgery medications should be reviewed with your Bariatric provider and taken as prescribed   Take no more than 2 pills at a time and wait 15-20 minutes between pills       Pain Medication  The first few days home, you may require narcotic pain medication to manage your pain.  Take  - 2 multivitamins with 100% Daily Value of Iron, Folic Acid and Thiamine   Vitamin D-3 - Take 3000 IU  per day  Vitamin B-12 - Oral or Sublingual: 350-500 mcg/day OR 1000 mcg Monthly intramuscular shot        ACTIVITY    Be active.  Sit up as much as possible.  Walk often.  Walking and/or foot exercises will help prevent blood clots.  Continue to sip liquids throughout the day  Continue to use your incentive spirometer 4 to 5 times per day  Continue using your CPAP if previous prescribed.  Keep your incisions clean and dry to prevent infection.    Showering is ok.  No submersion in water for 2 weeks (No tubs, pools, etc.)  Weight lifting restrictions:  10 lbs. for the first 2 weeks, 20 lbs. for the next 4 to 6 weeks    TOP REASONS TO CONTACT YOUR SURGEONS'S OFFICE    You have severe pain or discomfort unrelieved by pain medication.  You have been vomiting for more than 24 hours.  Call sooner if you are unable to drink any fluids.  Temperature rises above 101.5 degrees.  You have persistent nausea and/or vomiting.  You are unable to swallow liquids   Increased swelling, redness, or drainage from your incision sites.

## 2025-03-14 NOTE — PROGRESS NOTES
Surgery Progress Note    3/14/2025    Admit Date: 3/13/2025  5:38 AM    CC: Abdominal pain    POD: 1 bypass    Subjective:     Doing well.  Moderate pain.  No nausea.  Drinking well    Constitutional: No fever or chills  Neurologic: No headache  Eyes: No scleral icterus or irritated eyes  Nose: No nasal pain or drainage  Mouth: No oral lesions or sore throat  Cardiac: No palpations or chest pain  Pulmonary: No cough or shortness of breath  Gastrointestinal: Abdominal pain, no nausea, emesis, diarrhea, or constipation  Genitourinary: No dysuria  Musculoskeletal: No muscle or joint tenderness  Skin: No rashes or lesions  Psychiatric: No anxiety or depressed mood    Objective:     Vitals:    03/14/25 1000   BP:    Pulse: 70   Resp:    Temp:    SpO2:        General: No acute distress, conversant  Eyes: PERRLA, no scleral icterus  HENT: Normocephalic without oral lesions  Neck: Trachea midline without LAD  Cardiac: Normal pulse rate and rhythm  Pulmonary: Symmetric chest rise with normal effort  GI: Soft, ATTP, wounds cdi  Skin: Warm without rash  Extremities: No edema or joint stiffness  Psych: Appropriate mood and affect    Labs, vital signs, and I/O reviewed.    Assessment:     29-year-old female doing well after bypass    Plan:     Increased pain regimen  Continue IV fluid  Bariatric full liquid diet.  PPI  Hemoglobin stable, Lovenox  Likely home later today    Neal Ross MD, FACS, [facility]  Bariatric and General Surgeon  [facility]

## 2025-03-17 ENCOUNTER — TELEPHONE (OUTPATIENT)
Age: 30
End: 2025-03-17

## 2025-03-17 NOTE — TELEPHONE ENCOUNTER
----- Message from Monika MATUTE sent at 3/17/2025  8:56 AM EDT -----  Regarding: Discharge Phone Call  Hello,    Please call Ms. Whitman for her discharge phone call.  Please put in a reminder for her 3 week call as well.     Thank you!    Monika  
b/c pain med is causing some nausea and when walking pt would pain med which causes more nausea is like a vicious cycle, trying to ween off pain med.    Referred to provider: Dr. Neal Ross     Next appointment: 03/27/2025      Red Flags = prompt referral to a bariatric team provider for follow up    Fever > 101  Vomiting and not tolerating liquids   Weak and dizzy / lightheaded   Dark urine   Abdominal pain despite medication, splinting, ice or heat   SOB  Calf swelling and or redness   Chest pain   Additional Comments: ____________________________________________________________    If more than one parameter is not met or considered poor, nurse needs to discuss with provider recommend for patient to be seen in the office as soon as possible or refer to the provider for follow-up.  Reinforce to patient to use bariatric educational booklet as guide.  It is appropriate to refer patient to the nutritionist to discuss more in detail of diet and nutrition.

## 2025-03-27 ENCOUNTER — OFFICE VISIT (OUTPATIENT)
Age: 30
End: 2025-03-27

## 2025-03-27 VITALS
BODY MASS INDEX: 48.82 KG/M2 | HEART RATE: 81 BPM | RESPIRATION RATE: 16 BRPM | SYSTOLIC BLOOD PRESSURE: 139 MMHG | WEIGHT: 293 LBS | TEMPERATURE: 98.1 F | OXYGEN SATURATION: 96 % | HEIGHT: 65 IN | DIASTOLIC BLOOD PRESSURE: 79 MMHG

## 2025-03-27 DIAGNOSIS — E66.01 MORBID OBESITY: Primary | ICD-10-CM

## 2025-03-27 DIAGNOSIS — Z09 SURGICAL FOLLOW-UP CARE: ICD-10-CM

## 2025-03-27 ASSESSMENT — PATIENT HEALTH QUESTIONNAIRE - PHQ9
SUM OF ALL RESPONSES TO PHQ QUESTIONS 1-9: 0
SUM OF ALL RESPONSES TO PHQ QUESTIONS 1-9: 0
1. LITTLE INTEREST OR PLEASURE IN DOING THINGS: NOT AT ALL
2. FEELING DOWN, DEPRESSED OR HOPELESS: NOT AT ALL
SUM OF ALL RESPONSES TO PHQ QUESTIONS 1-9: 0
SUM OF ALL RESPONSES TO PHQ QUESTIONS 1-9: 0

## 2025-03-27 NOTE — PROGRESS NOTES
Identified patient with two patient identifiers (name and ). Reviewed chart in preparation for visit and have obtained necessary documentation.    Cordelia Whitman is a 29 y.o. female  Chief Complaint   Patient presents with    Weight Management    Post-Op Check     2 week s/p ROBOTIC GASTRIC BYPASS, ESOPHAGOGASTRODUODENOSCOPY (EGD) (E R A S) DOS 3/13/25     /79 (BP Site: Left Upper Arm, Patient Position: Sitting, BP Cuff Size: Large Adult)   Pulse 81   Temp 98.1 °F (36.7 °C) (Oral)   Resp 16   Ht 1.651 m (5' 5\")   Wt (!) 142.5 kg (314 lb 3.2 oz)   SpO2 96%   BMI 52.29 kg/m²     1. Have you been to the ER, urgent care clinic since your last visit?  Hospitalized since your last visit?no    2. Have you seen or consulted any other health care providers outside of the [facility] since your last visit?  Include any pap smears or colon screening. no

## 2025-03-27 NOTE — PROGRESS NOTES
2 weeks status post gastric bypass.    Pt reports doing well on liquids .    No complaints of pain.   Pt reports no nausea and no vomiting. She vomited with pb and banana.  Sheis drinking approximately 64 oz of water daily  + BM  She is drinking and eating 50-55 grams of protein daily.   Has a few more lovenox injections left.   She is taking bariatric vitamins without issue.     Total weight loss since surgery 20lbs  Weight loss since last visit 20lbs  /79 (BP Site: Left Upper Arm, Patient Position: Sitting, BP Cuff Size: Large Adult)   Pulse 81   Temp 98.1 °F (36.7 °C) (Oral)   Resp 16   Ht 1.651 m (5' 5\")   Wt (!) 142.5 kg (314 lb 3.2 oz)   SpO2 96%   BMI 52.29 kg/m²            Ms. Whitman has a reminder for a \"due or due soon\" health maintenance. I have asked that she contact her primary care provider for follow-up on this health maintenance.      Physical Examination: General appearance - alert, well appearing, and in no distress,  Chest - clear to auscultation bilaterally  Heart - normal rate, regular rhythm, normal S1, S2, no murmurs, rubs, clicks or gallops  Abdomen - soft, nontender, nondistended  scars from previous incisions healing without erythema or induration    A/P    Doing well 2 weeks status post laparoscopic Gastric Bypass  Diet advanced to soft foods.    Focus on 50-60 grams of protein daily.   Encouraged water intake to 64 oz of non-carbonated/no calorie beverages daily.   Supplement with unflavored protein powder daily.   Continue PPI  No lifting greater than 20 lbs.    Follow up in 2 weeks.   Finish Lovenox injections.     Pt verbalized understanding and questions were answered to the best of my knowledge and ability.    diet educational materials were provided.      STEPHANY Ziegler - ULISSES

## 2025-03-27 NOTE — PATIENT INSTRUCTIONS
What you need to know:  1.   Advance your diet to soft foods.  Follow the handout that you were given today in the office.   2.  Take the recommended vitamins daily  3.  No lifting greater than 20 lbs.   4.  You can do light jogging and walking.   5  Follow up in 2 weeks.  6.  You may go into a pool.   7.  If you are not able to tolerate liquids or soft foods.   Please call our office. [phone]  8.  If you have vomiting and persistent epigastric pain or chest pain. You should call our office, the doctor on-call or go to the emergency room.     Constipation  Benefiber, Miralax & similar (once or twice daily)  Milk of Magnesia (daily as needed)  Dulcolax suppository  Fleets Enema    Soft and Mushy   What is this diet?  Introduces soft, easy to digest foods  Low fat, no sugar added    When do I begin?  Once instructed by your surgeon or NP. Usually 2 -3 weeks after surgery      You will stay on this diet until instructed to start the next phase.     What foods can I eat?  Moist, mushy foods (see approved list of foods)       Key Points  Continue to drink 48-64 ounces of low calorie, non-carbonated, sugar free beverages between meals.   Eat 3 meals per day  Measure each meal to ?cup per meal  Aim for 60 grams of protein every day.  Try food sources of protein first.   Continue to supplement with protein shakes/powder to meet protein goals.  Take small bites.  Try eating with smaller utensils (baby spoon, cocktail fork).  Chew food thoroughly  Allow about 30 minutes to eat a meal.  Eating too fast may cause nausea or vomiting.  Stop eating as soon as you feel full. Overeating may stretch your stomach's capacity and prevent desired weight loss.  Do not drink liquids during meals and 30 minutes after meals.  Drinking with meals may cause nausea or vomiting.  Add one new food at a time  Take vitamins daily                     Shopping Lists    Soft and Mushy  In addition to everything on the Bariatric Liquid diet, you may

## 2025-04-10 ENCOUNTER — TELEMEDICINE (OUTPATIENT)
Age: 30
End: 2025-04-10

## 2025-04-10 VITALS — WEIGHT: 293 LBS | HEIGHT: 65 IN | BODY MASS INDEX: 48.82 KG/M2

## 2025-04-10 DIAGNOSIS — Z09 SURGICAL FOLLOW-UP CARE: ICD-10-CM

## 2025-04-10 DIAGNOSIS — E66.01 MORBID OBESITY: Primary | ICD-10-CM

## 2025-04-10 PROCEDURE — 99024 POSTOP FOLLOW-UP VISIT: CPT | Performed by: NURSE PRACTITIONER

## 2025-04-10 ASSESSMENT — PATIENT HEALTH QUESTIONNAIRE - PHQ9
SUM OF ALL RESPONSES TO PHQ QUESTIONS 1-9: 0
2. FEELING DOWN, DEPRESSED OR HOPELESS: NOT AT ALL
1. LITTLE INTEREST OR PLEASURE IN DOING THINGS: NOT AT ALL

## 2025-04-10 ASSESSMENT — PAIN SCALES - GENERAL: PAINLEVEL_OUTOF10: 0

## 2025-04-10 NOTE — PATIENT INSTRUCTIONS
What you need to know:  1 .Advance your diet to moist meats. Follow the handout that you were given today in the office.   2. Take the recommended vitamins daily  3 No lifting greater than 40 lbs.   4. You can do light jogging, moderate walking and a recumbent bike.   5 Follow up in 2 weeks.  6. You may go into a pool.   7. If you are not able to tolerate liquids, soft foods or moist meats.   Please call our office. [phone]  8. If you have vomiting and persistent epigastric pain or chest pain. You should call our office, the doctor on-call or go to the emergency room.         Constipation  Benefiber, Miralax & similar (once or twice daily)  Milk of Magnesia (daily as needed)  Dulcolax suppository  Fleets Enema    Moist Meats   What is this diet?  This phase adds moist meats in addition to foods in Soft & Mushy  Lean protein sources  When do I begin?  When directed by your NP or surgeon, usually 4 weeks after surgery          What new foods can I eat?  Moist meat and poultry  Moist fish and seafood          Shopping Lists      Protein - include with every meal  Tuna packed in water   Fleming (canned, frozen, fresh)   White flaky fish (christine, cod, flounder, tilapia)   Canned chicken packed in water    96-99% fat free thinly sliced deli meat (ham, turkey, chicken)   Silken Tofu   Skinless turkey or chicken (prepare to a soft texture)   Lean ground meat  Lean pork (cooked until very tender, cut into small pieces)     Sample Meal Plan:  Moist Meats    Breakfast ½ cup soft cooked eggs (2) 16 grams protein   Snack (optional) Low-fat string cheese 5 grams protein   Lunch 2 slices of lean deli turkey (2 oz.), ¼ cup soft fruit or  ½ cup tuna salad made with low-fat mayonnaise 14 grams protein   14 grams protein   Snack (optional) Greek yogurt or low-fat cottage cheese or low-fat string cheese 8-15 grams protein   Dinner Soft/flaky fish (2 oz.)  ¼ cup soft cooked vegetables 14 grams protein

## 2025-04-10 NOTE — PROGRESS NOTES
Identified pt with two pt identifiers (name and ). Reviewed chart in preparation for visit and have obtained necessary documentation.    Cordelia Whitman is a 29 y.o. female  Chief Complaint   Patient presents with    Weight Management           Ht 1.651 m (5' 5\")   Wt (!) 137.9 kg (304 lb)   BMI 50.59 kg/m²     1. Have you been to the ER, urgent care clinic since your last visit?  Hospitalized since your last visit?no    2. Have you seen or consulted any other health care providers outside of the [facility] since your last visit?  Include any pap smears or colon screening. no  
is located as indicated above. If you are not or unsure, please re-schedule the visit: Yes, I confirm.          --STEPHANY Ziegler NP on 4/10/2025 at 8:06 AM    An electronic signature was used to authenticate this note.       STEPHANY Ziegler NP

## 2025-04-25 ENCOUNTER — TELEMEDICINE (OUTPATIENT)
Age: 30
End: 2025-04-25

## 2025-04-25 VITALS — HEIGHT: 65 IN | BODY MASS INDEX: 48.82 KG/M2 | WEIGHT: 293 LBS

## 2025-04-25 DIAGNOSIS — Z98.890 POST-OPERATIVE STATE: Primary | ICD-10-CM

## 2025-04-25 PROCEDURE — 99024 POSTOP FOLLOW-UP VISIT: CPT | Performed by: SURGERY

## 2025-04-25 ASSESSMENT — PATIENT HEALTH QUESTIONNAIRE - PHQ9
10. IF YOU CHECKED OFF ANY PROBLEMS, HOW DIFFICULT HAVE THESE PROBLEMS MADE IT FOR YOU TO DO YOUR WORK, TAKE CARE OF THINGS AT HOME, OR GET ALONG WITH OTHER PEOPLE: NOT DIFFICULT AT ALL
SUM OF ALL RESPONSES TO PHQ QUESTIONS 1-9: 0
9. THOUGHTS THAT YOU WOULD BE BETTER OFF DEAD, OR OF HURTING YOURSELF: NOT AT ALL
SUM OF ALL RESPONSES TO PHQ QUESTIONS 1-9: 0
SUM OF ALL RESPONSES TO PHQ QUESTIONS 1-9: 0
4. FEELING TIRED OR HAVING LITTLE ENERGY: NOT AT ALL
6. FEELING BAD ABOUT YOURSELF - OR THAT YOU ARE A FAILURE OR HAVE LET YOURSELF OR YOUR FAMILY DOWN: NOT AT ALL
2. FEELING DOWN, DEPRESSED OR HOPELESS: NOT AT ALL
SUM OF ALL RESPONSES TO PHQ QUESTIONS 1-9: 0
1. LITTLE INTEREST OR PLEASURE IN DOING THINGS: NOT AT ALL
7. TROUBLE CONCENTRATING ON THINGS, SUCH AS READING THE NEWSPAPER OR WATCHING TELEVISION: NOT AT ALL
3. TROUBLE FALLING OR STAYING ASLEEP: NOT AT ALL
8. MOVING OR SPEAKING SO SLOWLY THAT OTHER PEOPLE COULD HAVE NOTICED. OR THE OPPOSITE, BEING SO FIGETY OR RESTLESS THAT YOU HAVE BEEN MOVING AROUND A LOT MORE THAN USUAL: NOT AT ALL
5. POOR APPETITE OR OVEREATING: NOT AT ALL

## 2025-04-25 ASSESSMENT — PAIN SCALES - GENERAL: PAINLEVEL_OUTOF10: 0

## 2025-04-25 NOTE — PROGRESS NOTES
Surgery Progress Note    2025    CC: Post op    Subjective:     TELEHEALTH EVALUATION -- Audio/Visual    HPI:    Cordelia Whitman (:  1995) has requested an audio/video evaluation for the following concern(s):    Patient doing well postop.  Still with moderate meat intolerances.  Can do some ground meat and some shrimp. Over 30 pounds down.  Working out quite frequently.  Taking vitamins.    Cordelia Whitman, was evaluated through a synchronous (real-time) audio-video encounter. The patient (or guardian if applicable) is aware that this is a billable service, which includes applicable co-pays. This Virtual Visit was conducted with patient's (and/or legal guardian's) consent. Patient identification was verified, and a caregiver was present when appropriate.   The patient was located at Home: [address] [place] [id number]  Provider was located at Facility (Appt Dept): [address] [place] [address] [address] [facility] [address]  [place] [address]  Confirm you are appropriately licensed, registered, or certified to deliver care in the state where the patient is located as indicated above. If you are not or unsure, please re-schedule the visit: Yes, I confirm.       Constitutional: No fever or chills  Neurologic: No headache  Eyes: No scleral icterus or irritated eyes  Nose: No nasal pain or drainage  Mouth: No oral lesions or sore throat  Cardiac: No palpations or chest pain  Pulmonary: No cough or shortness of breath  Gastrointestinal: No nausea, emesis, diarrhea, or constipation  Genitourinary: No dysuria  Musculoskeletal: No muscle or joint tenderness  Skin: No rashes or lesions  Psychiatric: No anxiety or depressed mood    Objective:   There were no vitals filed for this visit.  Body mass index is 49.94 kg/m².  Wt 300 lbs    General: No acute distress, conversant      Assessment:     29-year-old female doing well after gastric bypass    Plan:     No acute concerns.  Increase exercise, okay to expand diet.  Slowly

## 2025-04-25 NOTE — PROGRESS NOTES
Identified patient with two patient identifiers (name and ). Reviewed chart in preparation for visit and have obtained necessary documentation.    Cordelia Whitman is a 29 y.o. female  Chief Complaint   Patient presents with    Bariatrics Post Op Follow Up     6 weeks post robotic assist gastric bypass      Ht 1.651 m (5' 5\")   Wt (!) 136.1 kg (300 lb 1.6 oz)   BMI 49.94 kg/m²     1. Have you been to the ER, urgent care clinic since your last visit?  Hospitalized since your last visit?no    2. Have you seen or consulted any other health care providers outside of the [facility] since your last visit?  Include any pap smears or colon screening. no

## 2025-04-28 ENCOUNTER — TELEPHONE (OUTPATIENT)
Age: 30
End: 2025-04-28

## 2025-04-28 NOTE — TELEPHONE ENCOUNTER
Identified patient with two patient identifiers (name and ). Reviewed chart in preparation for encounter and have obtained necessary documentation.    Patient call and scheduled per lilo  4-month postoperative visit with NP

## 2025-07-28 ENCOUNTER — OFFICE VISIT (OUTPATIENT)
Facility: CLINIC | Age: 30
End: 2025-07-28
Payer: MEDICAID

## 2025-07-28 VITALS
RESPIRATION RATE: 16 BRPM | OXYGEN SATURATION: 98 % | HEIGHT: 65 IN | TEMPERATURE: 98 F | HEART RATE: 69 BPM | DIASTOLIC BLOOD PRESSURE: 77 MMHG | SYSTOLIC BLOOD PRESSURE: 115 MMHG | WEIGHT: 256.2 LBS | BODY MASS INDEX: 42.69 KG/M2

## 2025-07-28 DIAGNOSIS — F41.9 ANXIETY: ICD-10-CM

## 2025-07-28 DIAGNOSIS — E03.9 ACQUIRED HYPOTHYROIDISM: ICD-10-CM

## 2025-07-28 DIAGNOSIS — E11.9 CONTROLLED TYPE 2 DIABETES MELLITUS WITHOUT COMPLICATION, WITHOUT LONG-TERM CURRENT USE OF INSULIN (HCC): ICD-10-CM

## 2025-07-28 DIAGNOSIS — E11.9 CONTROLLED TYPE 2 DIABETES MELLITUS WITHOUT COMPLICATION, WITHOUT LONG-TERM CURRENT USE OF INSULIN (HCC): Primary | ICD-10-CM

## 2025-07-28 DIAGNOSIS — Z98.84 H/O BARIATRIC SURGERY: ICD-10-CM

## 2025-07-28 DIAGNOSIS — F51.01 PRIMARY INSOMNIA: ICD-10-CM

## 2025-07-28 DIAGNOSIS — E55.9 VITAMIN D DEFICIENCY: ICD-10-CM

## 2025-07-28 DIAGNOSIS — F31.81 BIPOLAR 2 DISORDER (HCC): ICD-10-CM

## 2025-07-28 DIAGNOSIS — Z11.59 ENCOUNTER FOR HEPATITIS C SCREENING TEST FOR LOW RISK PATIENT: ICD-10-CM

## 2025-07-28 PROCEDURE — 99214 OFFICE O/P EST MOD 30 MIN: CPT | Performed by: PHYSICIAN ASSISTANT

## 2025-07-28 PROCEDURE — 3044F HG A1C LEVEL LT 7.0%: CPT | Performed by: PHYSICIAN ASSISTANT

## 2025-07-28 SDOH — ECONOMIC STABILITY: INCOME INSECURITY: IN THE LAST 12 MONTHS, WAS THERE A TIME WHEN YOU WERE NOT ABLE TO PAY THE MORTGAGE OR RENT ON TIME?: YES

## 2025-07-28 SDOH — ECONOMIC STABILITY: FOOD INSECURITY: WITHIN THE PAST 12 MONTHS, THE FOOD YOU BOUGHT JUST DIDN'T LAST AND YOU DIDN'T HAVE MONEY TO GET MORE.: SOMETIMES TRUE

## 2025-07-28 SDOH — ECONOMIC STABILITY: FOOD INSECURITY: WITHIN THE PAST 12 MONTHS, YOU WORRIED THAT YOUR FOOD WOULD RUN OUT BEFORE YOU GOT MONEY TO BUY MORE.: SOMETIMES TRUE

## 2025-07-28 SDOH — ECONOMIC STABILITY: TRANSPORTATION INSECURITY
IN THE PAST 12 MONTHS, HAS LACK OF TRANSPORTATION KEPT YOU FROM MEETINGS, WORK, OR FROM GETTING THINGS NEEDED FOR DAILY LIVING?: NO

## 2025-07-28 SDOH — ECONOMIC STABILITY: TRANSPORTATION INSECURITY
IN THE PAST 12 MONTHS, HAS THE LACK OF TRANSPORTATION KEPT YOU FROM MEDICAL APPOINTMENTS OR FROM GETTING MEDICATIONS?: NO

## 2025-07-28 ASSESSMENT — ENCOUNTER SYMPTOMS
NAUSEA: 0
DIARRHEA: 0
EYES NEGATIVE: 1
BLOOD IN STOOL: 0
CONSTIPATION: 0
RESPIRATORY NEGATIVE: 1
VOMITING: 0
SHORTNESS OF BREATH: 0
ABDOMINAL PAIN: 0

## 2025-07-28 NOTE — PROGRESS NOTES
Cordelia Whitman  Identified pt with two pt identifiers(name and ).     Chief Complaint   Patient presents with    Diabetes     Room     Thyroid Problem       Reviewed record In preparation for visit and have obtained necessary documentation.     1. Have you been to the ER, urgent care clinic or hospitalized since your last visit? No     2. Have you seen or consulted any other health care providers outside of the [facility] since your last visit? Include any pap smears or colon screening. No    Patient has an advance directive.     Vitals reviewed with provider.    Health Maintenance reviewed:     Health Maintenance Due   Topic    Diabetic retinal exam     Hepatitis C screen     Pneumococcal 0-49 years Vaccine (1 of 2 - PCV)    Pap smear     Lipids           Wt Readings from Last 3 Encounters:   25 116.2 kg (256 lb 3.2 oz)   25 (!) 136.1 kg (300 lb 1.6 oz)   04/10/25 (!) 137.9 kg (304 lb)        Temp Readings from Last 3 Encounters:   25 98.1 °F (36.7 °C) (Oral)   25 97.9 °F (36.6 °C) (Oral)        BP Readings from Last 3 Encounters:   25 139/79   25 113/66   25 130/83        Pulse Readings from Last 3 Encounters:   25 81   25 79   25 85             No data to display                  Learning Assessment:   :         2023    11:30 AM   [facility] AMB LEARNING ASSESSMENT   Primary Learner Patient   Primary Language ENGLISH   Learning Preference DEMONSTRATION   Answered By Patient   Relationship to Learner SELF         Fall Risk Assessment:         2021    11:36 AM   Amb Fall Risk Assessment and TUG Test   Total Score 0         Abuse Screening:          No data to display                  ADL Screenin/5/2023    11:00 AM   ADL ASSESSMENT   Feeding yourself No Help Needed   Getting from bed to chair No Help Needed   Getting dressed No Help Needed   Bathing or showering No Help Needed   Walk across the room (includes cane/walker) 
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    Hepatic steatosis 3/8/2021    Hydradenitis     Hyperlipidemia     Overactive bladder Unknown    Pre-eclampsia [id number]    Psychiatric disorder     anxiety and depression since age 16    Psychiatric problem     depression      Past Surgical History:   Procedure Laterality Date     SECTION  [id number]    ENDOSCOPY, COLON, DIAGNOSTIC      ESOPHAGOSCOPY N/A 2024    ESOPHAGOSCOPY BIOPSY performed by Neal Ross MD at [facility] ENDOSCOPY    GYN      c/section x1    ORTHOPEDIC SURGERY      left foot fracture    OTHER SURGICAL HISTORY      wisdom teeth     OTHER SURGICAL HISTORY      myrengotomy 2010    OTHER SURGICAL HISTORY      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    GENO-EN-Y GASTRIC BYPASS N/A 3/13/2025    ROBOTIC GASTRIC BYPASS, ESOPHAGOGASTRODUODENOSCOPY (EGD) (E R A S) performed by Neal Ross MD at [facility] MAIN OR    UPPER GASTROINTESTINAL ENDOSCOPY N/A 2024    ESOPHAGOGASTRODUODENOSCOPY WITH BIOPSY performed by Neal Ross MD at [facility] ENDOSCOPY        Family History   Problem Relation Age of Onset    Stroke Mother     Hypertension Father     Obesity Brother     Hypertension Maternal Aunt     Cancer Maternal Aunt         ovarian, breast    No Known Problems Maternal Uncle     Thyroid Disease Paternal Aunt     Cancer Paternal Aunt         breast    Suicide Paternal Uncle     Depression Paternal Uncle     Hypertension Paternal Uncle     Cancer Paternal Uncle         benign brain tumor, lung    COPD Maternal Grandmother     Diabetes Maternal Grandfather     Alzheimer's Disease Maternal Grandfather     Breast Cancer Paternal Grandmother     Cancer Paternal Grandfather         brain/lung/liver/colon    Anesth Problems Neg Hx         Social History     Socioeconomic History    Marital status: Single     Spouse name: Not on file    Number of children: Not on file    Years of education: Not on file    Highest education level: Not on file

## 2025-07-29 LAB
25(OH)D3 SERPL-MCNC: 40.9 NG/ML (ref 30–100)
CHOLEST SERPL-MCNC: 142 MG/DL
EST. AVERAGE GLUCOSE BLD GHB EST-MCNC: 103 MG/DL
HBA1C MFR BLD: 5.2 % (ref 4–5.6)
HCV AB SER IA-ACNC: <0.02 INDEX
HCV AB SERPL QL IA: NONREACTIVE
HDLC SERPL-MCNC: 42 MG/DL
HDLC SERPL: 3.4 (ref 0–5)
LDLC SERPL CALC-MCNC: 57.2 MG/DL (ref 0–100)
T4 FREE SERPL-MCNC: 0.7 NG/DL (ref 0.8–1.5)
TRIGL SERPL-MCNC: 214 MG/DL
TSH SERPL DL<=0.05 MIU/L-ACNC: 18 UIU/ML (ref 0.36–3.74)
VLDLC SERPL CALC-MCNC: 42.8 MG/DL

## (undated) DEVICE — GARMENT,MEDLINE,DVT,INT,CALF,MED, GEN2: Brand: MEDLINE

## (undated) DEVICE — CATH IV AUTOGRD BC PNK 20GA 25 -- INSYTE

## (undated) DEVICE — SYRINGE MED 30ML STD CLR PLAS LUERLOCK TIP N CTRL DISP

## (undated) DEVICE — CANNULA CUSH AD W/ 14FT TBG

## (undated) DEVICE — BLUNTFILL WITH FILTER: Brand: MONOJECT

## (undated) DEVICE — GLOVE SURG SZ 7 L12IN FNGR THK79MIL GRN LTX FREE

## (undated) DEVICE — FORCEPS BX L240CM JAW DIA2.8MM L CAP W/ NDL MIC MESH TOOTH

## (undated) DEVICE — STAPLER 60 RELOAD WHITE: Brand: SUREFORM

## (undated) DEVICE — SYR 10ML LUER LOK 1/5ML GRAD --

## (undated) DEVICE — GENERAL LAPAROSCOPY - SMH: Brand: MEDLINE INDUSTRIES, INC.

## (undated) DEVICE — ENDO CARRY-ON PROCEDURE KIT INCLUDES ENZYMATIC SPONGE, GAUZE, BIOHAZARD LABEL, TRAY, LUBRICANT, DIRTY SCOPE LABEL, WATER LABEL, TRAY, DRAWSTRING PAD, AND DEFENDO 4-PIECE KIT.: Brand: ENDO CARRY-ON PROCEDURE KIT

## (undated) DEVICE — VESSEL SEALER EXTEND: Brand: ENDOWRIST

## (undated) DEVICE — TIP COVER ACCESSORY

## (undated) DEVICE — SYRINGE MED 10ML LUERLOCK TIP W/O SFTY DISP

## (undated) DEVICE — SET ADMIN 16ML TBNG L100IN 2 Y INJ SITE IV PIGGY BK DISP (ORDER IN MULIPLES OF 48)

## (undated) DEVICE — GLOVE SURG SZ 65 L12IN FNGR THK94MIL STD WHT LTX FREE

## (undated) DEVICE — BITEBLOCK ENDOSCP 60FR MAXI WHT POLYETH STURDY W/ VELC WVN

## (undated) DEVICE — BLUNTFILL: Brand: MONOJECT

## (undated) DEVICE — SYRINGE MEDICAL 3ML CLEAR PLASTIC STANDARD NON CONTROL LUERLOCK TIP DISPOSABLE

## (undated) DEVICE — NEONATAL-ADULT SPO2 SENSOR: Brand: NELLCOR

## (undated) DEVICE — AIR SHEET,LAT,COMFORT GLIDE, BLEND 40X80: Brand: MEDLINE

## (undated) DEVICE — SOLUTION ANTIFOG VIS SYS CLEARIFY LAPSCP

## (undated) DEVICE — ELECTRO LUBE IS A SINGLE PATIENT USE DEVICE THAT IS INTENDED TO BE USED ON ELECTROSURGICAL ELECTRODES TO REDUCE STICKING.: Brand: KEY SURGICAL ELECTRO LUBE

## (undated) DEVICE — SOLUTION IV 1000 ML 0.9 NACL INJ USP EXCEL PLAS CONTAINER

## (undated) DEVICE — BLOCK BITE ENDOSCP AD 21 MM W/ DIL BLU LF DISP

## (undated) DEVICE — YANKAUER,TAPERED BULBOUS TIP,W/O VENT: Brand: MEDLINE

## (undated) DEVICE — SYR 3ML LL TIP 1/10ML GRAD --

## (undated) DEVICE — 1200 GUARD II KIT W/5MM TUBE W/O VAC TUBE: Brand: GUARDIAN

## (undated) DEVICE — Z DISCONTINUED PER MEDLINE LINE GAS SAMPLING O2/CO2 LNG AD 13 FT NSL W/ TBNG FILTERLINE

## (undated) DEVICE — SUTURE MONOCRYL ABSORBABLE L 6 IN SZ 3-0 POLYGLCOLIC ACD MONOFILAMENT SH

## (undated) DEVICE — Device

## (undated) DEVICE — FORCEPS BX L160CM DIA8MM GRSP DISECT CUP TIP NONLOCKING ROT

## (undated) DEVICE — X-RAY DETECTABLE SPONGES,16 PLY: Brand: VISTEC

## (undated) DEVICE — ELECTRODE,RADIOTRANSLUCENT,FOAM,5PK: Brand: MEDLINE

## (undated) DEVICE — SYSTEM EVAC SMOKE LAPARSCOPIC

## (undated) DEVICE — CONTAINER SPEC 20 ML LID NEUT BUFF FORMALIN 10 % POLYPR STS

## (undated) DEVICE — SEAL

## (undated) DEVICE — TOWEL 4 PLY TISS 19X30 SUE WHT

## (undated) DEVICE — BLADELESS OBTURATOR: Brand: WECK VISTA

## (undated) DEVICE — STAPLER 60: Brand: SUREFORM

## (undated) DEVICE — PACK,BASIC,SIRUS,V: Brand: MEDLINE

## (undated) DEVICE — LIQUIBAND RAPID ADHESIVE 36/CS 0.8ML: Brand: MEDLINE

## (undated) DEVICE — BASIN EMSIS 16OZ GRAPHITE PLAS KID SHP MOLD GRAD FOR ORAL

## (undated) DEVICE — NEEDLE HYPO 18GA L1.5IN PNK S STL HUB POLYPR SHLD REG BVL

## (undated) DEVICE — KIT COLON W/ 1.1OZ LUB AND 2 END

## (undated) DEVICE — SUTURE MONOCRYL + SZ 4-0 L27IN ABSRB UD L19MM PS-2 3/8 CIR MCP426H

## (undated) DEVICE — SHEET TRNSF DISPOSABLE HOVERMATT 100 PER CA

## (undated) DEVICE — TROCAR ENDOSCP L100MM DIA5MM BLDELSS STBL SL THRD OPT VW

## (undated) DEVICE — SYRINGE MED 5ML STD CLR PLAS LUERLOCK TIP N CTRL DISP

## (undated) DEVICE — NEEDLE SPNL 22GA L3.5IN BLK HUB S STL REG WALL FIT STYL

## (undated) DEVICE — STAPLER 60 RELOAD BLUE: Brand: SUREFORM

## (undated) DEVICE — ELECTRODE,RADIOTRANSLUCENT,FOAM,3PK: Brand: MEDLINE

## (undated) DEVICE — BAG SPEC BIOHZRD 10 X 10 IN --

## (undated) DEVICE — SOLIDIFIER FLD 2OZ 1500CC N DISINF IN BTL DISP SAFESORB

## (undated) DEVICE — CATHETER IV 22GA L1IN OD0.8382-0.9144MM ID0.6096-0.6858MM 382523

## (undated) DEVICE — ARM DRAPE

## (undated) DEVICE — VISIGI 3D®  CALIBRATION SYSTEM  SIZE 40FR STD W/ BULB: Brand: BOEHRINGER® VISIGI 3D™ SLEEVE GASTRECTOMY CALIBRATION SYSTEM, SIZE 40FR W/BULB

## (undated) DEVICE — SUTURE NONABSORBABLE MONOFILAMENT 2-0 V-20 6 IN V-LOC PBT VLOCN0605

## (undated) DEVICE — SET ADMIN 16ML TBNG L100IN 2 Y INJ SITE IV PIGGY BK DISP

## (undated) DEVICE — IV STRT KT 3282] LSL INDUSTRIES INC]